# Patient Record
Sex: FEMALE | Race: ASIAN | NOT HISPANIC OR LATINO | URBAN - METROPOLITAN AREA
[De-identification: names, ages, dates, MRNs, and addresses within clinical notes are randomized per-mention and may not be internally consistent; named-entity substitution may affect disease eponyms.]

---

## 2018-09-09 ENCOUNTER — INPATIENT (INPATIENT)
Facility: HOSPITAL | Age: 63
LOS: 7 days | Discharge: HOME | DRG: 694 | End: 2018-09-17
Attending: INTERNAL MEDICINE | Admitting: INTERNAL MEDICINE
Payer: COMMERCIAL

## 2018-09-09 VITALS
SYSTOLIC BLOOD PRESSURE: 126 MMHG | DIASTOLIC BLOOD PRESSURE: 77 MMHG | TEMPERATURE: 99 F | HEART RATE: 88 BPM | OXYGEN SATURATION: 97 % | RESPIRATION RATE: 18 BRPM

## 2018-09-09 DIAGNOSIS — Z90.710 ACQUIRED ABSENCE OF BOTH CERVIX AND UTERUS: Chronic | ICD-10-CM

## 2018-09-09 DIAGNOSIS — Z98.890 OTHER SPECIFIED POSTPROCEDURAL STATES: Chronic | ICD-10-CM

## 2018-09-09 DIAGNOSIS — K52.9 NONINFECTIVE GASTROENTERITIS AND COLITIS, UNSPECIFIED: ICD-10-CM

## 2018-09-09 LAB
ACETONE SERPL-MCNC: ABNORMAL
ALBUMIN SERPL ELPH-MCNC: 2.5 G/DL — LOW (ref 3.5–5)
ALP SERPL-CCNC: 89 U/L — SIGNIFICANT CHANGE UP (ref 40–120)
ALT FLD-CCNC: 25 U/L DA — SIGNIFICANT CHANGE UP (ref 10–60)
ANION GAP SERPL CALC-SCNC: 15 MMOL/L — SIGNIFICANT CHANGE UP (ref 5–17)
APPEARANCE UR: ABNORMAL
APTT BLD: 27.4 SEC — LOW (ref 27.5–37.4)
AST SERPL-CCNC: 17 U/L — SIGNIFICANT CHANGE UP (ref 10–40)
BACTERIA # UR AUTO: ABNORMAL /HPF
BASOPHILS # BLD AUTO: 0.1 K/UL — SIGNIFICANT CHANGE UP (ref 0–0.2)
BASOPHILS NFR BLD AUTO: 0.4 % — SIGNIFICANT CHANGE UP (ref 0–2)
BILIRUB SERPL-MCNC: 0.6 MG/DL — SIGNIFICANT CHANGE UP (ref 0.2–1.2)
BILIRUB UR-MCNC: NEGATIVE — SIGNIFICANT CHANGE UP
BUN SERPL-MCNC: 32 MG/DL — HIGH (ref 7–18)
CALCIUM SERPL-MCNC: 8.3 MG/DL — LOW (ref 8.4–10.5)
CHLORIDE SERPL-SCNC: 99 MMOL/L — SIGNIFICANT CHANGE UP (ref 96–108)
CK MB BLD-MCNC: <2.1 % — SIGNIFICANT CHANGE UP (ref 0–3.5)
CK MB CFR SERPL CALC: <1 NG/ML — SIGNIFICANT CHANGE UP (ref 0–3.6)
CK SERPL-CCNC: 47 U/L — SIGNIFICANT CHANGE UP (ref 21–215)
CO2 SERPL-SCNC: 19 MMOL/L — LOW (ref 22–31)
COLOR SPEC: YELLOW — SIGNIFICANT CHANGE UP
CREAT SERPL-MCNC: 1.09 MG/DL — SIGNIFICANT CHANGE UP (ref 0.5–1.3)
DIFF PNL FLD: ABNORMAL
EOSINOPHIL # BLD AUTO: 0 K/UL — SIGNIFICANT CHANGE UP (ref 0–0.5)
EOSINOPHIL NFR BLD AUTO: 0.1 % — SIGNIFICANT CHANGE UP (ref 0–6)
EPI CELLS # UR: SIGNIFICANT CHANGE UP /HPF
GLUCOSE SERPL-MCNC: 106 MG/DL — HIGH (ref 70–99)
GLUCOSE UR QL: NEGATIVE — SIGNIFICANT CHANGE UP
HCT VFR BLD CALC: 33 % — LOW (ref 34.5–45)
HGB BLD-MCNC: 10.9 G/DL — LOW (ref 11.5–15.5)
INR BLD: 0.95 RATIO — SIGNIFICANT CHANGE UP (ref 0.88–1.16)
KETONES UR-MCNC: ABNORMAL
LEUKOCYTE ESTERASE UR-ACNC: ABNORMAL
LIDOCAIN IGE QN: 92 U/L — SIGNIFICANT CHANGE UP (ref 73–393)
LYMPHOCYTES # BLD AUTO: 0.7 K/UL — LOW (ref 1–3.3)
LYMPHOCYTES # BLD AUTO: 4.8 % — LOW (ref 13–44)
MAGNESIUM SERPL-MCNC: 1.8 MG/DL — SIGNIFICANT CHANGE UP (ref 1.6–2.6)
MCHC RBC-ENTMCNC: 29.4 PG — SIGNIFICANT CHANGE UP (ref 27–34)
MCHC RBC-ENTMCNC: 33.1 GM/DL — SIGNIFICANT CHANGE UP (ref 32–36)
MCV RBC AUTO: 88.8 FL — SIGNIFICANT CHANGE UP (ref 80–100)
MONOCYTES # BLD AUTO: 1.5 K/UL — HIGH (ref 0–0.9)
MONOCYTES NFR BLD AUTO: 9.7 % — SIGNIFICANT CHANGE UP (ref 2–14)
NEUTROPHILS # BLD AUTO: 12.8 K/UL — HIGH (ref 1.8–7.4)
NEUTROPHILS NFR BLD AUTO: 85 % — HIGH (ref 43–77)
NITRITE UR-MCNC: NEGATIVE — SIGNIFICANT CHANGE UP
PH UR: 5 — SIGNIFICANT CHANGE UP (ref 5–8)
PLATELET # BLD AUTO: 185 K/UL — SIGNIFICANT CHANGE UP (ref 150–400)
POTASSIUM SERPL-MCNC: 3.6 MMOL/L — SIGNIFICANT CHANGE UP (ref 3.5–5.3)
POTASSIUM SERPL-SCNC: 3.6 MMOL/L — SIGNIFICANT CHANGE UP (ref 3.5–5.3)
PROT SERPL-MCNC: 7.4 G/DL — SIGNIFICANT CHANGE UP (ref 6–8.3)
PROT UR-MCNC: 100
PROTHROM AB SERPL-ACNC: 10.3 SEC — SIGNIFICANT CHANGE UP (ref 9.8–12.7)
RBC # BLD: 3.71 M/UL — LOW (ref 3.8–5.2)
RBC # FLD: 13.8 % — SIGNIFICANT CHANGE UP (ref 10.3–14.5)
RBC CASTS # UR COMP ASSIST: ABNORMAL /HPF (ref 0–2)
SODIUM SERPL-SCNC: 133 MMOL/L — LOW (ref 135–145)
SP GR SPEC: 1.02 — SIGNIFICANT CHANGE UP (ref 1.01–1.02)
TROPONIN I SERPL-MCNC: <0.015 NG/ML — SIGNIFICANT CHANGE UP (ref 0–0.04)
UROBILINOGEN FLD QL: NEGATIVE — SIGNIFICANT CHANGE UP
WBC # BLD: 15.1 K/UL — HIGH (ref 3.8–10.5)
WBC # FLD AUTO: 15.1 K/UL — HIGH (ref 3.8–10.5)
WBC UR QL: ABNORMAL /HPF (ref 0–5)

## 2018-09-09 PROCEDURE — 71045 X-RAY EXAM CHEST 1 VIEW: CPT | Mod: 26

## 2018-09-09 PROCEDURE — 99285 EMERGENCY DEPT VISIT HI MDM: CPT

## 2018-09-09 PROCEDURE — 74177 CT ABD & PELVIS W/CONTRAST: CPT | Mod: 26

## 2018-09-09 RX ORDER — SODIUM CHLORIDE 9 MG/ML
3 INJECTION INTRAMUSCULAR; INTRAVENOUS; SUBCUTANEOUS ONCE
Qty: 0 | Refills: 0 | Status: COMPLETED | OUTPATIENT
Start: 2018-09-09 | End: 2018-09-09

## 2018-09-09 RX ORDER — SODIUM CHLORIDE 9 MG/ML
2000 INJECTION INTRAMUSCULAR; INTRAVENOUS; SUBCUTANEOUS ONCE
Qty: 0 | Refills: 0 | Status: COMPLETED | OUTPATIENT
Start: 2018-09-09 | End: 2018-09-09

## 2018-09-09 RX ORDER — MORPHINE SULFATE 50 MG/1
4 CAPSULE, EXTENDED RELEASE ORAL ONCE
Qty: 0 | Refills: 0 | Status: DISCONTINUED | OUTPATIENT
Start: 2018-09-09 | End: 2018-09-09

## 2018-09-09 RX ORDER — ONDANSETRON 8 MG/1
4 TABLET, FILM COATED ORAL ONCE
Qty: 0 | Refills: 0 | Status: COMPLETED | OUTPATIENT
Start: 2018-09-09 | End: 2018-09-09

## 2018-09-09 RX ORDER — SODIUM CHLORIDE 9 MG/ML
1000 INJECTION INTRAMUSCULAR; INTRAVENOUS; SUBCUTANEOUS
Qty: 0 | Refills: 0 | Status: DISCONTINUED | OUTPATIENT
Start: 2018-09-09 | End: 2018-09-13

## 2018-09-09 RX ORDER — PIPERACILLIN AND TAZOBACTAM 4; .5 G/20ML; G/20ML
3.38 INJECTION, POWDER, LYOPHILIZED, FOR SOLUTION INTRAVENOUS ONCE
Qty: 0 | Refills: 0 | Status: COMPLETED | OUTPATIENT
Start: 2018-09-09 | End: 2018-09-09

## 2018-09-09 RX ADMIN — ONDANSETRON 4 MILLIGRAM(S): 8 TABLET, FILM COATED ORAL at 22:47

## 2018-09-09 RX ADMIN — PIPERACILLIN AND TAZOBACTAM 200 GRAM(S): 4; .5 INJECTION, POWDER, LYOPHILIZED, FOR SOLUTION INTRAVENOUS at 22:49

## 2018-09-09 RX ADMIN — SODIUM CHLORIDE 2000 MILLILITER(S): 9 INJECTION INTRAMUSCULAR; INTRAVENOUS; SUBCUTANEOUS at 22:48

## 2018-09-09 RX ADMIN — MORPHINE SULFATE 4 MILLIGRAM(S): 50 CAPSULE, EXTENDED RELEASE ORAL at 22:49

## 2018-09-09 RX ADMIN — SODIUM CHLORIDE 125 MILLILITER(S): 9 INJECTION INTRAMUSCULAR; INTRAVENOUS; SUBCUTANEOUS at 19:15

## 2018-09-09 RX ADMIN — SODIUM CHLORIDE 3 MILLILITER(S): 9 INJECTION INTRAMUSCULAR; INTRAVENOUS; SUBCUTANEOUS at 19:15

## 2018-09-09 NOTE — ED ADULT NURSE NOTE - NSIMPLEMENTINTERV_GEN_ALL_ED
Implemented All Universal Safety Interventions:  [place] to call system. Call bell, personal items and telephone within reach. Instruct patient to call for assistance. Room bathroom lighting operational. Non-slip footwear when patient is off stretcher. Physically safe environment: no spills, clutter or unnecessary equipment. Stretcher in lowest position, wheels locked, appropriate side rails in place.

## 2018-09-09 NOTE — ED PROVIDER NOTE - CARE PLAN
Principal Discharge DX:	Colitis  Secondary Diagnosis:	Pyelonephritis  Secondary Diagnosis:	Renal colic

## 2018-09-09 NOTE — ED PROVIDER NOTE - MEDICAL DECISION MAKING DETAILS
62 y/o F pt w/ left sided abdominal pain, concern for diverticulitis, will check labs and CT, concern for dehydration as well, will reassess

## 2018-09-09 NOTE — ED PROVIDER NOTE - OBJECTIVE STATEMENT
62 y/o F pt w/ PMHx of COPD presents to ED c/o left lower abdominal pain x 4 days. Pt reports associated subjective fever, decreased appetite, and nausea. Pt saw primary doctor, and given a fleet enema. Pt says pain is dull, constant and radiates to her upper abdomen and is no relieved when she takes Tylenol. Pt was treated with Cipro, today is day 3 of course. Pt denies chills, dysuria and all other complaints. Allergic to Ibuprofen and Motrin.

## 2018-09-09 NOTE — ED ADULT NURSE NOTE - NS ED NURSE LEVEL OF CONSCIOUSNESS SPEECH
Miscarried in [place] this weekend at 19 wks, needed a D&C  U/s today shows no residual POCs but multiple fibroids  Wants to get pregnant in near future  Abstain x 2 months ok to try again   Speaking Coherently

## 2018-09-10 DIAGNOSIS — J44.9 CHRONIC OBSTRUCTIVE PULMONARY DISEASE, UNSPECIFIED: ICD-10-CM

## 2018-09-10 DIAGNOSIS — J98.11 ATELECTASIS: ICD-10-CM

## 2018-09-10 DIAGNOSIS — K52.9 NONINFECTIVE GASTROENTERITIS AND COLITIS, UNSPECIFIED: ICD-10-CM

## 2018-09-10 DIAGNOSIS — J47.9 BRONCHIECTASIS, UNCOMPLICATED: ICD-10-CM

## 2018-09-10 DIAGNOSIS — J45.909 UNSPECIFIED ASTHMA, UNCOMPLICATED: ICD-10-CM

## 2018-09-10 DIAGNOSIS — D64.9 ANEMIA, UNSPECIFIED: ICD-10-CM

## 2018-09-10 DIAGNOSIS — N23 UNSPECIFIED RENAL COLIC: ICD-10-CM

## 2018-09-10 DIAGNOSIS — N12 TUBULO-INTERSTITIAL NEPHRITIS, NOT SPECIFIED AS ACUTE OR CHRONIC: ICD-10-CM

## 2018-09-10 DIAGNOSIS — Z29.9 ENCOUNTER FOR PROPHYLACTIC MEASURES, UNSPECIFIED: ICD-10-CM

## 2018-09-10 DIAGNOSIS — K29.80 DUODENITIS WITHOUT BLEEDING: ICD-10-CM

## 2018-09-10 LAB
ANION GAP SERPL CALC-SCNC: 10 MMOL/L — SIGNIFICANT CHANGE UP (ref 5–17)
BUN SERPL-MCNC: 20 MG/DL — HIGH (ref 7–18)
CALCIUM SERPL-MCNC: 7.6 MG/DL — LOW (ref 8.4–10.5)
CHLORIDE SERPL-SCNC: 106 MMOL/L — SIGNIFICANT CHANGE UP (ref 96–108)
CHOLEST SERPL-MCNC: 152 MG/DL — SIGNIFICANT CHANGE UP (ref 10–199)
CO2 SERPL-SCNC: 21 MMOL/L — LOW (ref 22–31)
CREAT SERPL-MCNC: 0.92 MG/DL — SIGNIFICANT CHANGE UP (ref 0.5–1.3)
CULTURE RESULTS: SIGNIFICANT CHANGE UP
EOSINOPHIL NFR BLD AUTO: 2 % — SIGNIFICANT CHANGE UP (ref 0–6)
GLUCOSE SERPL-MCNC: 149 MG/DL — HIGH (ref 70–99)
HCT VFR BLD CALC: 32.9 % — LOW (ref 34.5–45)
HDLC SERPL-MCNC: 15 MG/DL — LOW
HGB BLD-MCNC: 10.5 G/DL — LOW (ref 11.5–15.5)
LACTATE SERPL-SCNC: 0.8 MMOL/L — SIGNIFICANT CHANGE UP (ref 0.7–2)
LIPID PNL WITH DIRECT LDL SERPL: 89 MG/DL — SIGNIFICANT CHANGE UP
LYMPHOCYTES # BLD AUTO: 9 % — LOW (ref 13–44)
MAGNESIUM SERPL-MCNC: 1.7 MG/DL — SIGNIFICANT CHANGE UP (ref 1.6–2.6)
MCHC RBC-ENTMCNC: 28.5 PG — SIGNIFICANT CHANGE UP (ref 27–34)
MCHC RBC-ENTMCNC: 32 GM/DL — SIGNIFICANT CHANGE UP (ref 32–36)
MCV RBC AUTO: 89.2 FL — SIGNIFICANT CHANGE UP (ref 80–100)
MONOCYTES NFR BLD AUTO: 8 % — SIGNIFICANT CHANGE UP (ref 2–14)
NEUTROPHILS NFR BLD AUTO: 63 % — SIGNIFICANT CHANGE UP (ref 43–77)
PHOSPHATE SERPL-MCNC: 1.6 MG/DL — LOW (ref 2.5–4.5)
PLATELET # BLD AUTO: 188 K/UL — SIGNIFICANT CHANGE UP (ref 150–400)
POTASSIUM SERPL-MCNC: 3.4 MMOL/L — LOW (ref 3.5–5.3)
POTASSIUM SERPL-SCNC: 3.4 MMOL/L — LOW (ref 3.5–5.3)
RBC # BLD: 3.69 M/UL — LOW (ref 3.8–5.2)
RBC # FLD: 13.9 % — SIGNIFICANT CHANGE UP (ref 10.3–14.5)
SODIUM SERPL-SCNC: 137 MMOL/L — SIGNIFICANT CHANGE UP (ref 135–145)
SPECIMEN SOURCE: SIGNIFICANT CHANGE UP
TOTAL CHOLESTEROL/HDL RATIO MEASUREMENT: 10.1 RATIO — HIGH (ref 3.3–7.1)
TRIGL SERPL-MCNC: 241 MG/DL — HIGH (ref 10–149)
TSH SERPL-MCNC: 1.77 UU/ML — SIGNIFICANT CHANGE UP (ref 0.34–4.82)
WBC # BLD: 15.5 K/UL — HIGH (ref 3.8–10.5)
WBC # FLD AUTO: 15.5 K/UL — HIGH (ref 3.8–10.5)

## 2018-09-10 RX ORDER — ENOXAPARIN SODIUM 100 MG/ML
40 INJECTION SUBCUTANEOUS DAILY
Qty: 0 | Refills: 0 | Status: DISCONTINUED | OUTPATIENT
Start: 2018-09-10 | End: 2018-09-17

## 2018-09-10 RX ORDER — METRONIDAZOLE 500 MG
500 TABLET ORAL EVERY 8 HOURS
Qty: 0 | Refills: 0 | Status: DISCONTINUED | OUTPATIENT
Start: 2018-09-10 | End: 2018-09-17

## 2018-09-10 RX ORDER — TAMSULOSIN HYDROCHLORIDE 0.4 MG/1
0.4 CAPSULE ORAL AT BEDTIME
Qty: 0 | Refills: 0 | Status: DISCONTINUED | OUTPATIENT
Start: 2018-09-10 | End: 2018-09-17

## 2018-09-10 RX ORDER — POTASSIUM PHOSPHATE, MONOBASIC POTASSIUM PHOSPHATE, DIBASIC 236; 224 MG/ML; MG/ML
15 INJECTION, SOLUTION INTRAVENOUS ONCE
Qty: 0 | Refills: 0 | Status: COMPLETED | OUTPATIENT
Start: 2018-09-10 | End: 2018-09-10

## 2018-09-10 RX ORDER — PANTOPRAZOLE SODIUM 20 MG/1
40 TABLET, DELAYED RELEASE ORAL
Qty: 0 | Refills: 0 | Status: DISCONTINUED | OUTPATIENT
Start: 2018-09-10 | End: 2018-09-17

## 2018-09-10 RX ORDER — FERROUS SULFATE 325(65) MG
325 TABLET ORAL DAILY
Qty: 0 | Refills: 0 | Status: DISCONTINUED | OUTPATIENT
Start: 2018-09-10 | End: 2018-09-17

## 2018-09-10 RX ORDER — CEFTRIAXONE 500 MG/1
1 INJECTION, POWDER, FOR SOLUTION INTRAMUSCULAR; INTRAVENOUS EVERY 24 HOURS
Qty: 0 | Refills: 0 | Status: DISCONTINUED | OUTPATIENT
Start: 2018-09-10 | End: 2018-09-17

## 2018-09-10 RX ORDER — BUDESONIDE AND FORMOTEROL FUMARATE DIHYDRATE 160; 4.5 UG/1; UG/1
2 AEROSOL RESPIRATORY (INHALATION)
Qty: 0 | Refills: 0 | Status: DISCONTINUED | OUTPATIENT
Start: 2018-09-10 | End: 2018-09-17

## 2018-09-10 RX ORDER — ALBUTEROL 90 UG/1
1 AEROSOL, METERED ORAL EVERY 6 HOURS
Qty: 0 | Refills: 0 | Status: DISCONTINUED | OUTPATIENT
Start: 2018-09-10 | End: 2018-09-17

## 2018-09-10 RX ORDER — POTASSIUM CHLORIDE 20 MEQ
40 PACKET (EA) ORAL ONCE
Qty: 0 | Refills: 0 | Status: COMPLETED | OUTPATIENT
Start: 2018-09-10 | End: 2018-09-10

## 2018-09-10 RX ADMIN — MORPHINE SULFATE 4 MILLIGRAM(S): 50 CAPSULE, EXTENDED RELEASE ORAL at 00:10

## 2018-09-10 RX ADMIN — Medication 100 MILLIGRAM(S): at 07:21

## 2018-09-10 RX ADMIN — TAMSULOSIN HYDROCHLORIDE 0.4 MILLIGRAM(S): 0.4 CAPSULE ORAL at 21:21

## 2018-09-10 RX ADMIN — BUDESONIDE AND FORMOTEROL FUMARATE DIHYDRATE 2 PUFF(S): 160; 4.5 AEROSOL RESPIRATORY (INHALATION) at 21:21

## 2018-09-10 RX ADMIN — Medication 100 MILLIGRAM(S): at 21:18

## 2018-09-10 RX ADMIN — Medication 40 MILLIEQUIVALENT(S): at 19:22

## 2018-09-10 RX ADMIN — BUDESONIDE AND FORMOTEROL FUMARATE DIHYDRATE 2 PUFF(S): 160; 4.5 AEROSOL RESPIRATORY (INHALATION) at 12:33

## 2018-09-10 RX ADMIN — Medication 100 MILLIGRAM(S): at 18:35

## 2018-09-10 RX ADMIN — ENOXAPARIN SODIUM 40 MILLIGRAM(S): 100 INJECTION SUBCUTANEOUS at 12:30

## 2018-09-10 RX ADMIN — POTASSIUM PHOSPHATE, MONOBASIC POTASSIUM PHOSPHATE, DIBASIC 62.5 MILLIMOLE(S): 236; 224 INJECTION, SOLUTION INTRAVENOUS at 19:22

## 2018-09-10 NOTE — CONSULT NOTE ADULT - ATTENDING COMMENTS
Agree with note as written above. Pt currently with some left sided abdominal pain but well controlled.    Plan:  - Reviewed CT imaging. Pt with possible left ureteral stone with mild hydro and concern for pyelonephritis vs. microabscess. In addition with some possible colitis  - Continue to monitor closely  - NPO past midnight in case pt clinically worsens requiring ureteral stent

## 2018-09-10 NOTE — H&P ADULT - ASSESSMENT
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 She had lower abdominal pain on thursday morning which she initially thought that is from holding urine for long time or form constipation but it persisted through out day so she went to PCP next day who gave her cipro and fleet enema for constipation. Her pain was dull over right lower quadrant constant, 5/10 in intensity. After enema she had small bowel movement but it did not help her with pain and she also noted some nausea. Denies urinary problems, chest pain, dizziness, vomiting, SOB, BE.       In ED:  vitals: 129/63, 87, 98.7, 18(100)  labs: pertinent for WBC of 15.1 and Na of 133  UA positive   CT abdomen/pelvis: evidence of colitis and left pyelonephritis   stat dose of zosyn and levoflox

## 2018-09-10 NOTE — ED ADULT NURSE REASSESSMENT NOTE - NS ED NURSE REASSESS COMMENT FT1
Pt received aoxo3 with ivf  infusing no infiltration at site, complaints verbalized no distress noted.

## 2018-09-10 NOTE — CONSULT NOTE ADULT - PROBLEM SELECTOR RECOMMENDATION 9
Ventolin HFA 2 puffs po QiD PRN  Symbicort 160/4.5 mcgs 2 puffs po QiD  PFTs as OP
1- UA & CS.  2- Blood culture.  3- Renal and bladder sonogram.  4- Rocephin 1 gm IVPB q day.  5- IV and PO hydration.  6- CBC and BMP follow up.  7- Continue IV Flagyl for colitis.

## 2018-09-10 NOTE — H&P ADULT - PROBLEM SELECTOR PLAN 4
IMPROVE VTE Individual Risk Assessment          RISK                                                          Points  [  ] Previous VTE                                                3  [  ] Thrombophilia                                             2  [  ] Lower limb paralysis                                   2        (unable to hold up >15 seconds)    [  ] Current Cancer                                             2         (within 6 months)  [x  ] Immobilization > 24 hrs                              1  [  ] ICU/CCU stay > 24 hours                             1  [ x ] Age > 60                                                         1    IMPROVE VTE Score: 2  will start on lovenox

## 2018-09-10 NOTE — CONSULT NOTE ADULT - REASON FOR ADMISSION
lower abdominal pain for 4 days

## 2018-09-10 NOTE — CONSULT NOTE ADULT - ASSESSMENT
62 yo female with left 2mm distal ureteral ston, L pyelonephritis with small microabscess    1) cont iv antibx  2) recommend npo after midnight for possible cysto and stent  3) pre-op labs  4)rec flomax  5) cont med management  6) case and plan discussed with Dr. Jackson and agrees 64 yo female with left 2mm distal ureteral ston, L pyelonephritis with small microabscess    1) cont iv antibx  2) recommend npo after midnight for possible cysto and stent  3) pre-op labs  4)rec flomax  5) cont med management  6) please optimize pt medically for possible OR in AM  7) case and plan discussed with Dr. Jackson and agrees

## 2018-09-10 NOTE — H&P ADULT - NSHPPHYSICALEXAM_GEN_ALL_CORE
· Constitutional	Well-developed, well nourished  · Eyes	EOMI; PERRL; no drainage or redness  · ENMT	No oral lesions; no gross abnormalities  · Neck	No bruits; no thyromegaly or nodules   · Back	No deformity or limitation of movement   · Respiratory	Breath Sounds equal & clear to percussion & auscultation, no accessory muscle use  · Cardiovascular	Regular rate & rhythm, normal S1, S2; no murmurs, gallops or rubs; no S3, S4  · Gastrointestinal	Soft, non-tender, no hepatosplenomegaly, normal bowel sounds  · Extremities	No cyanosis, clubbing or edema   · Neurological	Alert & oriented; no sensory, motor or coordination deficits, normal reflexes  · Musculoskeletal	No joint pain, swelling or deformity; no limitation of movement · Constitutional	Well-developed, well nourished  · Eyes	EOMI; PERRL; no drainage or redness  · ENMT	No oral lesions; no gross abnormalities  · Neck	No bruits; no thyromegaly or nodules   · Back	No deformity or limitation of movement   · Respiratory	Breath Sounds equal & clear to percussion & auscultation, no accessory muscle use  · Cardiovascular	Regular rate & rhythm, normal S1, S2; no murmurs, gallops or rubs; no S3, S4  · Gastrointestinal	Soft, non-tender, no hepatosplenomegaly, normal bowel sounds  · Extremities	No cyanosis, clubbing or edema   · Neurological	Alert & oriented; no sensory, motor or coordination deficits, normal reflexes  · Musculoskeletal	No joint pain, swelling or deformity; no limitation of movement  -- back -left sided costo vertebral tenderness

## 2018-09-10 NOTE — H&P ADULT - PROBLEM SELECTOR PLAN 2
CT abdomen- colitis   presented with abdominal pain   was kept NPO over night   reports improvement in pain   will advance diet to full liquid

## 2018-09-10 NOTE — CONSULT NOTE ADULT - PROBLEM SELECTOR RECOMMENDATION 4
panculture  antibiotics  ID eval
1- Anemia profile.  2- Iron supplements.  3- Closely monitor H & H.  4- Observe for bleeding.

## 2018-09-10 NOTE — H&P ADULT - ATTENDING COMMENTS
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 She had lower abdominal pain on thursday morning which she initially thought that is from holding urine for long time or form constipation but it persisted through out day so she went to PCP next day who gave her cipro and fleet enema for constipation. Her pain was dull over right lower quadrant constant, 5/10 in intensity. After enema she had small bowel movement but it did not help her with pain and she also noted some nausea. Denies urinary problems, chest pain, dizziness, vomiting, SOB, BE.     pt seen in bed, vitals stable, physical exam reveals lungs cta b/l, heart s1s2, abd soft nd bs+, left flank tenderness to percussion, ext no edema. labs and diagnostic test result reviewed.    assessment  --  left pyelonephritis with micro abcesses, left distal ureteral stone, colitis, duodenitis, anemia, h/o asthma    plan  --  admit to med, rocephin, flagyl, flomax, cont preadmit home meds, gi and dvt profilaxis,  cbc, bmp, mg, phos, lipids, tsh, bld cx, ucx, anemia panel    urology cons  id cons  gi cons  pulm cons

## 2018-09-10 NOTE — PATIENT PROFILE ADULT. - VISION (WITH CORRECTIVE LENSES IF THE PATIENT USUALLY WEARS THEM):
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

## 2018-09-10 NOTE — CONSULT NOTE ADULT - ASSESSMENT
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 She had lower abdominal pain on thursday morning which she initially thought that is from holding urine for long time or form constipation but it persisted through out day so she went to PCP next day who gave her Cipro and fleet enema for constipation.  Ct abdomen suggested left pyelonephritis and mild colitis.  Patient was started on IV Rocephin and Flagyl.

## 2018-09-10 NOTE — CONSULT NOTE ADULT - PROBLEM SELECTOR RECOMMENDATION 2
incentive spirometry  Bronchodilators  chest pt
1- IV hydration.  2- stool for ova and parasites.  3- Stool for culture.  4- stool for CDT.  5- CBC & BMP follow up.   6- ABX as per problem #1.

## 2018-09-10 NOTE — H&P ADULT - PROBLEM SELECTOR PLAN 1
qSOFA 0  presented with back pain, abdominal pain, nausea  reports that she frequently holds urine   CT abdomen/pelvis: colitis and left pyelonephritis   stat dose of zosyn and levoflox was given in ED   will start on rocephin and metro  follow Urine culture  ID Dr. Ace qSOFA 0  presented with back pain, abdominal pain, nausea  reports that she frequently holds urine   CT abdomen/pelvis: colitis and left pyelonephritis with microabscess noted   stat dose of zosyn and levoflox was given in ED   will start on rocephin and metro  follow Urine culture  ID Dr. Ace  urology consult for microabscess and stone in distal ureter

## 2018-09-10 NOTE — CONSULT NOTE ADULT - NEGATIVE ENMT SYMPTOMS
no nose bleeds/no ear pain/no hearing difficulty/no throat pain/no dysphagia/no dry mouth/no gum bleeding

## 2018-09-10 NOTE — CONSULT NOTE ADULT - PROBLEM SELECTOR RECOMMENDATION 3
Bronchodilators prn
1- Bronchodilators.  2- O2 as needed.  3- Pulmonary evaluation and management.  4- Steroids as per primary, and pulmonary team

## 2018-09-10 NOTE — CONSULT NOTE ADULT - ASSESSMENT
1. Abdominal pain  2. Colitis  3. Duodenal ulcer  4. Pyelonephritis  5. Left urethral stone    Suggestions:    1. Check stool for culture, O&P and C. diff toxin  2. Monitor electrolytes  3. Antibiotics IV  4. Protonix 40mg daily  5. Avoid NSAID  6. Urology evaluation  7. EGD  8. DVT prophylaxis

## 2018-09-10 NOTE — PROGRESS NOTE ADULT - SUBJECTIVE AND OBJECTIVE BOX
NP Note discussed with  Primary Attending    Patient is a 63y old  Female who presents with a chief complaint of lower abdominal pain for 4 days (10 Sep 2018 12:59)      INTERVAL HPI/OVERNIGHT EVENTS: no new complaints    MEDICATIONS  (STANDING):  ALBUTerol    90 MICROgram(s) HFA Inhaler 1 Puff(s) Inhalation every 6 hours  buDESOnide  80 MICROgram(s)/formoterol 4.5 MICROgram(s) Inhaler 2 Puff(s) Inhalation two times a day  cefTRIAXone   IVPB 1 Gram(s) IV Intermittent every 24 hours  enoxaparin Injectable 40 milliGRAM(s) SubCutaneous daily  ferrous    sulfate 325 milliGRAM(s) Oral daily  metroNIDAZOLE  IVPB 500 milliGRAM(s) IV Intermittent every 8 hours  pantoprazole    Tablet 40 milliGRAM(s) Oral before breakfast  sodium chloride 0.9%. 1000 milliLiter(s) (125 mL/Hr) IV Continuous <Continuous>  tamsulosin 0.4 milliGRAM(s) Oral at bedtime    MEDICATIONS  (PRN):      __________________________________________________  REVIEW OF SYSTEMS:    CONSTITUTIONAL: No fever,   EYES: no acute visual disturbances  NECK: No pain or stiffness  RESPIRATORY: No cough; No shortness of breath  CARDIOVASCULAR: No chest pain, no palpitations  GASTROINTESTINAL: Left/Mid-lower stomach tender. No nausea or vomiting; No diarrhea   NEUROLOGICAL: No headache or numbness, no tremors  MUSCULOSKELETAL: No joint pain, no muscle pain  GENITOURINARY: no dysuria, no frequency, no hesitancy  PSYCHIATRY: no depression , no anxiety  ALL OTHER  ROS negative        Vital Signs Last 24 Hrs  T(C): 37.1 (10 Sep 2018 12:31), Max: 37.6 (10 Sep 2018 07:47)  T(F): 98.8 (10 Sep 2018 12:31), Max: 99.6 (10 Sep 2018 07:47)  HR: 76 (10 Sep 2018 12:31) (75 - 88)  BP: 119/52 (10 Sep 2018 12:31) (119/52 - 129/63)  BP(mean): --  RR: 16 (10 Sep 2018 12:31) (16 - 18)  SpO2: 99% (10 Sep 2018 12:31) (97% - 100%)    ________________________________________________  PHYSICAL EXAM:  GENERAL: NAD  HEENT: Normocephalic;  conjunctivae and sclerae clear; moist mucous membranes;   NECK : supple  CHEST/LUNG: Clear to auscultation bilaterally with good air entry   HEART: S1 S2  regular; no murmurs, gallops or rubs  BACK: Left CVA tenderness  ABDOMEN: Soft, left lower abdominal tenderness, Nondistended; Bowel sounds present  EXTREMITIES: no cyanosis; no edema; no calf tenderness  SKIN: warm and dry; no rash  NERVOUS SYSTEM:  Awake and alert; Oriented  to place, person and time ; no new deficits    _________________________________________________  LABS:                        10.5   15.5  )-----------( 188      ( 10 Sep 2018 15:14 )             32.9         133<L>  |  99  |  32<H>  ----------------------------<  106<H>  3.6   |  19<L>  |  1.09    Ca    8.3<L>      09 Sep 2018 19:14  Mg     1.8         TPro  7.4  /  Alb  2.5<L>  /  TBili  0.6  /  DBili  x   /  AST  17  /  ALT  25  /  AlkPhos  89      PT/INR - ( 09 Sep 2018 19:14 )   PT: 10.3 sec;   INR: 0.95 ratio         PTT - ( 09 Sep 2018 19:14 )  PTT:27.4 sec  Urinalysis Basic - ( 09 Sep 2018 19:14 )    Color: Yellow / Appearance: Slightly Turbid / S.020 / pH: x  Gluc: x / Ketone: Large  / Bili: Negative / Urobili: Negative   Blood: x / Protein: 100 / Nitrite: Negative   Leuk Esterase: Moderate / RBC: 2-5 /HPF / WBC 11-25 /HPF   Sq Epi: x / Non Sq Epi: Few /HPF / Bacteria: Moderate /HPF      CAPILLARY BLOOD GLUCOSE            RADIOLOGY & ADDITIONAL TESTS:    Imaging Personally Reviewed:  YES    Consultant(s) Notes Reviewed:   YES    Care Discussed with Consultants :     Plan of care was discussed with patient and /or primary care giver; all questions and concerns were addressed and care was aligned with patient's wishes.

## 2018-09-10 NOTE — H&P ADULT - NSHPLABSRESULTS_GEN_ALL_CORE
ICU Vital Signs Last 24 Hrs  T(C): 37.1 (09 Sep 2018 23:36), Max: 37.1 (09 Sep 2018 23:36)  T(F): 98.7 (09 Sep 2018 23:36), Max: 98.7 (09 Sep 2018 23:36)  HR: 87 (09 Sep 2018 23:36) (87 - 88)  BP: 129/63 (09 Sep 2018 23:36) (126/77 - 129/63)  RR: 18 (09 Sep 2018 23:36) (18 - 18)  SpO2: 100% (09 Sep 2018 23:36) (97% - 100%)                            10.9   15.1  )-----------( 185      ( 09 Sep 2018 19:14 )             33.0           133<L>  |  99  |  32<H>  ----------------------------<  106<H>  3.6   |  19<L>  |  1.09    Ca    8.3<L>      09 Sep 2018 19:14  Mg     1.8         TPro  7.4  /  Alb  2.5<L>  /  TBili  0.6  /  DBili  x   /  AST  17  /  ALT  25  /  AlkPhos  89                Urinalysis Basic - ( 09 Sep 2018 19:14 )    Color: Yellow / Appearance: Slightly Turbid / S.020 / pH: x  Gluc: x / Ketone: Large  / Bili: Negative / Urobili: Negative   Blood: x / Protein: 100 / Nitrite: Negative   Leuk Esterase: Moderate / RBC: 2-5 /HPF / WBC 11-25 /HPF   Sq Epi: x / Non Sq Epi: Few /HPF / Bacteria: Moderate /HPF        PT/INR - ( 09 Sep 2018 19:14 )   PT: 10.3 sec;   INR: 0.95 ratio         PTT - ( 09 Sep 2018 19:14 )  PTT:27.4 sec    Lactate Trend  09-10 @ 01:14 Lactate:0.8       CARDIAC MARKERS ( 09 Sep 2018 19:14 )  <0.015 ng/mL / x     / 47 U/L / x     / <1.0 ng/mL    < from: CT Abdomen and Pelvis w/ Oral Cont and w/ IV Cont (18 @ 22:18) >      IMPRESSION:    Left-sided pyelonephritis with small microabscesses. Mild left renal   pelvic fullness without hydroureter. A 2 mm stone in the course of the   left distal ureter may represent an obstructing stone as opposed to   phlebolith; difficult to assess without ureteral distention or previous   study for comparison. Duodenitis.  Mild colitis.      < end of copied text >

## 2018-09-10 NOTE — CONSULT NOTE ADULT - SUBJECTIVE AND OBJECTIVE BOX
63y Female with PMHx of asthma presented with left flank pain radiating to the front for 4 days. She denies hematuria, no dysuria, no chest pain, no fevers at home.     pmhx: as above  pshx: none  meds:  buDESOnide  80 MICROgram(s)/formoterol 4.5 MICROgram(s) Inhaler 2 Puff(s) Inhalation two times a day  cefTRIAXone   IVPB 1 Gram(s) IV Intermittent every 24 hours  enoxaparin Injectable 40 milliGRAM(s) SubCutaneous daily  metroNIDAZOLE  IVPB 500 milliGRAM(s) IV Intermittent every 8 hours  pantoprazole    Tablet 40 milliGRAM(s) Oral before breakfast  sodium chloride 0.9%. 1000 milliLiter(s) IV Continuous <Continuous>  tamsulosin 0.4 milliGRAM(s) Oral at bedtime    Motrin (Swelling)      T(C): 37.1 (09-10-18 @ 12:31), Max: 37.6 (09-10-18 @ 07:47)  HR: 76 (09-10-18 @ 12:31) (75 - 88)  BP: 119/52 (09-10-18 @ 12:31) (119/52 - 129/63)  RR: 16 (09-10-18 @ 12:31) (16 - 18)  SpO2: 99% (09-10-18 @ 12:31) (97% - 100%)  Wt(kg): --    Gen: A&Ox3, NAD  Skin: no rashes, no jaundice  Abdomen: soft, nontender, nondistended  Back: left cva tenderness to palpation  Lower Extremities: no edema, no skin discoloration, no calf tenderness susan                          10.9   15.1  )-----------( 185      ( 09 Sep 2018 19:14 )             33.0   09-09    133<L>  |  99  |  32<H>  ----------------------------<  106<H>  3.6   |  19<L>  |  1.09    Ca    8.3<L>      09 Sep 2018 19:14  Mg     1.8     09-09    TPro  7.4  /  Alb  2.5<L>  /  TBili  0.6  /  DBili  x   /  AST  17  /  ALT  25  /  AlkPhos  89  09-09    < from: CT Abdomen and Pelvis w/ Oral Cont and w/ IV Cont (09.09.18 @ 22:18) >  IMPRESSION:    Left-sided pyelonephritis with small microabscesses. Mild left renal   pelvic fullness without hydroureter. A 2 mm stone in the course of the   left distal ureter may represent an obstructing stone as opposed to   phlebolith; difficult to assess without ureteral distention or previous   study for comparison. Duodenitis.  Mild colitis.      < end of copied text >

## 2018-09-10 NOTE — CONSULT NOTE ADULT - SUBJECTIVE AND OBJECTIVE BOX
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 She had lower abdominal pain on thursday morning which she initially thought that is from holding urine for long time or form constipation but it persisted through out day so she went to PCP next day who gave her cipro and fleet enema for constipation. Her pain was dull over right lower quadrant constant, 5/10 in intensity. After enema she had small bowel movement but it did not help her with pain and she also noted some nausea. Denies urinary problems, chest pain, dizziness, vomiting, SOB, BE. (10 Sep 2018 05:59)      PAST MEDICAL & SURGICAL HISTORY:  COPD (chronic obstructive pulmonary disease)  S/P thyroid surgery  S/P RANDOLPH-BSO      Motrin (Swelling)      buDESOnide  80 MICROgram(s)/formoterol 4.5 MICROgram(s) Inhaler 2 Puff(s) Inhalation two times a day  cefTRIAXone   IVPB 1 Gram(s) IV Intermittent every 24 hours  enoxaparin Injectable 40 milliGRAM(s) SubCutaneous daily  metroNIDAZOLE  IVPB 500 milliGRAM(s) IV Intermittent every 8 hours  pantoprazole    Tablet 40 milliGRAM(s) Oral before breakfast  sodium chloride 0.9%. 1000 milliLiter(s) IV Continuous <Continuous>      Social Hx:    FAMILY HISTORY:  No pertinent family history in first degree relatives        ROS  [  ] UNABLE TO ELICIT    General:  [  ] None  [  ] Fever  [  ] Chills  [  ] Malaise    Skin:  [  ] None [  ] Rash  [  ] Wound  [  ] Ulcer    HEENT:  [  ] None  [  ] Sore Throat  [  ] Nasal congestion/ runny nose  [  ] Photophobia [  ] Neck pain      Chest:  [  ] None   [  ] SOB  [  ] Cough  [  ] None    Cardiovascular:   [  ] None  [  ] CP  [  ] Palpitation    Gastrointestinal:  [  ] None  [  ] Abd pain   [  ] Nausea    [  ] Vomiting   [  ] Diarrhea	     Genitourinary:  [  ] None [  ] Polyuria   [  ] Urgency  [  ] Frequency  [  ] Dysuria    [  ]  Hematuria       Musculoskeletal:  [  ] None [  ] Back Pain	[  ] Body aches  [  ] Joint pain    Neurological: [  ] None [  ]Dizziness  [  ]Visual Disturbance  [  ]Headaches   [  ] Weakness      PHYSICAL EXAM:    Vital Signs Last 24 Hrs  T(C): 37.6 (10 Sep 2018 07:47), Max: 37.6 (10 Sep 2018 07:47)  T(F): 99.6 (10 Sep 2018 07:47), Max: 99.6 (10 Sep 2018 07:47)  HR: 75 (10 Sep 2018 07:47) (75 - 88)  BP: 125/65 (10 Sep 2018 07:47) (125/62 - 129/63)  BP(mean): --  RR: 16 (10 Sep 2018 07:47) (16 - 18)  SpO2: 100% (10 Sep 2018 07:47) (97% - 100%)    Constitutional:    HEENT: [  ] Wnl  [  ] Pharyngeal congestion    Neck:  [  ] Supple  [  ]Lymphadenopathy  [  ] No JVD   [  ] JVD  [  ] Masses   [  ] WNL    CHEST/Respiratory:  [  ]Clear to auscultation  [  ] Rales   [  ] Rhonchi   [  ] Wheezing     [  ] Chest Tenderness      Cardiovascular:  [  ] Reg S1 S2   [  ] Irreg S1 S2   [  ]No Murmur  [  ] +ve Murmurs  [  ]Systolic [  ]Diastolic      Abdomen:  [  ] Soft  [  ] No tendrerness  [  ] Tenderness  [  ] Organomegaly  [  ] ABD Distention  [  ] Rigidity                       [  ] No Regidity                       [  ] No Rebound Tenderness  [  ] No Guarding Rigidity  [  ] Rebound Tenderness[  ] Guarding Rigidity                          [  ]  +ve Bowel Sounds  [  ] Decreased Bowel Sounds    [  ] Absent Bowel Sounds                            Extremities: [  ] No edema [  ] Edema  [  ] Clubbing   [  ] Cyanosis                         [  ] No Tender Calf muscles  [  ] Tender Calf muscles                        [  ] Palpable peripheral pulses    Neurological: [  ] Awake  [  ] Alert  [  ] Oriented  x                             [  ] Confused  [  ] Drowsy  [  ] respond to painful stimuli  [  ] Unresponsive    Skin:  [  ] Intact [  ] Redness [  ] Thrombophlebitis  [  ] Rashes  [  ] Dry  [  ] Ulcers    Ortho:  [  ] Joint Swelling  [  ] Joint erythema [  ] Joint tenderness                [  ] Increased temp. to touch  [  ] DJD [  ] WNL      LABS/DIAGNOSTIC TESTS                          10.9   15.1  )-----------( 185      ( 09 Sep 2018 19:14 )             33.0     WBC Count: 15.1 K/uL ( @ 19:14)          133<L>  |  99  |  32<H>  ----------------------------<  106<H>  3.6   |  19<L>  |  1.09    Ca    8.3<L>      09 Sep 2018 19:14  Mg     1.8         TPro  7.4  /  Alb  2.5<L>  /  TBili  0.6  /  DBili  x   /  AST  17  /  ALT  25  /  AlkPhos  89        Urinalysis Basic - ( 09 Sep 2018 19:14 )    Color: Yellow / Appearance: Slightly Turbid / S.020 / pH: x  Gluc: x / Ketone: Large  / Bili: Negative / Urobili: Negative   Blood: x / Protein: 100 / Nitrite: Negative   Leuk Esterase: Moderate / RBC: 2-5 /HPF / WBC 11-25 /HPF   Sq Epi: x / Non Sq Epi: Few /HPF / Bacteria: Moderate /HPF        LIVER FUNCTIONS - ( 09 Sep 2018 19:14 )  Alb: 2.5 g/dL / Pro: 7.4 g/dL / ALK PHOS: 89 U/L / ALT: 25 U/L DA / AST: 17 U/L / GGT: x             PT/INR - ( 09 Sep 2018 19:14 )   PT: 10.3 sec;   INR: 0.95 ratio         PTT - ( 09 Sep 2018 19:14 )  PTT:27.4 sec    LACTATE:Lactate, Blood: 0.8 mmol/L (09-10 @ 01:14)        CULTURES:       RADIOLOGY    CXR:    EXAM:  XR CHEST AP OR PA 1V                            PROCEDURE DATE:  2018          INTERPRETATION:  History: Upper abdominal pain    Technique:  AP portable    Comparisons:  none    Findings:     Subsegmental atelectasis and volume loss inleft lower   lobe.  . Right lung is clear. No pleural effusions.    The pulmonary   vasculature and aorta are normal for age. Heart size is unremarkable.     The thorax is normal for age.    Impression: Subsegmental atelectasis left lower lobe. Mild relative   elevation of left diaphragm          EXAM:  CT ABDOMEN AND PELVIS OC IC                            PROCEDURE DATE:  2018          INTERPRETATION:  Abdominal/Pelvic CT    2018 10:28 PM    Indication: Abdominal pain and nausea, fever and diarrhea for the past 4   days    Technique: Axial images were obtained following oral and IV contrast from   the lung bases through pubic symphysis.       Reformatted coronal and   sagittal images are submitted.    Comparison: None    FINDINGS:    LUNG BASES:  There are no pleural effusions. Cystic bronchiectasis at the   left lung base  PERITONEUM:  There is no free air or focal collection.  No free fluid.  LIVER: At the dome, there is a small lobulated lesion measuring 1.2 cm   with nodular hyperenhancement probably representing hemangioma.   Subcentimeter lucencies too small to characterize..  SPLEEN: Normal.  GALLBLADDER: Contracted.  BILIARY TREE: Unremarkable.  PANCREAS: Normal.  ADRENAL GLANDS: Normal.  KIDNEYS: The left kidney demonstrates striated areas of hypoenhancement  concerning for pyelonephritis. There is a 2 mm stone in the expected   region of the left distal ureter. There are ill-defined lucencies in the   left upper kidney which probably represent microabscesses. There are   scattered cysts in the left lower kidney subcentimeter lucency in the   right kidney too small to characterize.  BOWEL: The stomach is incompletely distended.  Oral contrast is seen as   distally as rectum. There is marked thickening of the duodenum suggesting   duodenitis/peptic ulcer disease. There is no small bowel obstruction or   diverticulitis. The appendix is unremarkable. There is mild thickening of   the transverse and left colon suggesting colitis. Scattered   diverticulosis.    URINARY BLADDER: Collapsed.  PELVIC ORGANS: No pelvic masses.    There is no significant adenopathy. There is diffuse mesenteric edema.  VASCULATURE: The aorta is not dilated. There is mild atherosclerotic   vascular calcification.  RETROPERITONEUM:  There is no mass.  BONES: Unremarkable.  ABDOMINAL WALL: Unremarkable.    IMPRESSION:    Left-sided pyelonephritis with small microabscesses. Mild left renal   pelvic fullness without hydroureter. A 2 mm stone in the course of the   left distal ureter may represent an obstructing stone as opposed to   phlebolith; difficult to assess without ureteral distention or previous   study for comparison. Duodenitis.  Mild colitis. 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 She had lower abdominal pain on thursday morning which she initially thought that is from holding urine for long time or form constipation but it persisted through out day so she went to PCP next day who gave her cipro and fleet enema for constipation. Her pain was dull over right lower quadrant constant, 5/10 in intensity. After enema she had small bowel movement but it did not help her with pain and she also noted some nausea. Denies urinary problems, chest pain, dizziness, vomiting, SOB, BE. (10 Sep 2018 05:59)      PAST MEDICAL & SURGICAL HISTORY:  COPD (chronic obstructive pulmonary disease)  S/P thyroid surgery  S/P RANDOLPH-BSO      Motrin (Swelling)      buDESOnide  80 MICROgram(s)/formoterol 4.5 MICROgram(s) Inhaler 2 Puff(s) Inhalation two times a day  cefTRIAXone   IVPB 1 Gram(s) IV Intermittent every 24 hours  enoxaparin Injectable 40 milliGRAM(s) SubCutaneous daily  metroNIDAZOLE  IVPB 500 milliGRAM(s) IV Intermittent every 8 hours  pantoprazole    Tablet 40 milliGRAM(s) Oral before breakfast  sodium chloride 0.9%. 1000 milliLiter(s) IV Continuous <Continuous>      Social Hx:    FAMILY HISTORY:  No pertinent family history in first degree relatives        ROS  [  ] UNABLE TO ELICIT    General:  [  ] None  [  ] Fever  [  ] Chills  [ x ] Malaise    Skin:  [ x ] None [  ] Rash  [  ] Wound  [  ] Ulcer    HEENT:  [ x ] None  [  ] Sore Throat  [  ] Nasal congestion/ runny nose  [  ] Photophobia [  ] Neck pain      Chest:  [ x ] None   [  ] SOB  [  ] Cough  [  ] None    Cardiovascular:   [ x ] None  [  ] CP  [  ] Palpitation    Gastrointestinal:  [  ] None  [ x ] Abd pain   [  ] Nausea    [  ] Vomiting   [  ] Diarrhea	     Genitourinary:  [ x ] None [  ] Polyuria   [  ] Urgency  [  ] Frequency  [  ] Dysuria    [  ]  Hematuria       Musculoskeletal:  [  ] None [  ] Back Pain	[  ] Body aches  [  ] Joint pain [ x ] Weakness    Neurological: [  ] None [  ]Dizziness  [  ]Visual Disturbance  [  ]Headaches   [ x ] Weakness      PHYSICAL EXAM:    Vital Signs Last 24 Hrs  T(C): 37.6 (10 Sep 2018 07:47), Max: 37.6 (10 Sep 2018 07:47)  T(F): 99.6 (10 Sep 2018 07:47), Max: 99.6 (10 Sep 2018 07:47)  HR: 75 (10 Sep 2018 07:47) (75 - 88)  BP: 125/65 (10 Sep 2018 07:47) (125/62 - 129/63)  BP(mean): --  RR: 16 (10 Sep 2018 07:47) (16 - 18)  SpO2: 100% (10 Sep 2018 07:47) (97% - 100%)    Constitutional:    HEENT: [ x ] Wnl  [  ] Pharyngeal congestion    Neck:  [ x ] Supple  [  ]Lymphadenopathy  [ x ] No JVD   [  ] JVD  [  ] Masses   [  ] WNL    CHEST/Respiratory:  [ x ]Clear to auscultation  [  ] Rales   [  ] Rhonchi   [  ] Wheezing     [  ] Chest Tenderness      Cardiovascular:  [ x ] Reg S1 S2   [  ] Irreg S1 S2   [ x ]No Murmur  [  ] +ve Murmurs  [  ]Systolic [  ]Diastolic      Abdomen:  [ x ] Soft  [  ] No tendrerness  [  ] Tenderness (left CVA)  [  ] Organomegaly  [  ] ABD Distention  [  ] Rigidity                       [ x ] No Regidity                       [ x ] No Rebound Tenderness  [ x ] No Guarding Rigidity  [  ] Rebound Tenderness[  ] Guarding Rigidity                          [ x ]  +ve Bowel Sounds  [  ] Decreased Bowel Sounds    [  ] Absent Bowel Sounds                            Extremities: [ x ] No edema [  ] Edema  [  ] Clubbing   [  ] Cyanosis                         [ x ] No Tender Calf muscles  [  ] Tender Calf muscles                        [ x ] Palpable peripheral pulses    Neurological: [ x ] Awake  [ x ] Alert  [ x ] Oriented  x  3                           [  ] Confused  [  ] Drowsy  [  ] respond to painful stimuli  [  ] Unresponsive    Skin:  [ x ] Intact [  ] Redness [  ] Thrombophlebitis  [  ] Rashes  [  ] Dry  [  ] Ulcers    Ortho:  [  ] Joint Swelling  [  ] Joint erythema [  ] Joint tenderness                [  ] Increased temp. to touch  [  ] DJD [ x ] WNL      LABS/DIAGNOSTIC TESTS                          10.9   15.1  )-----------( 185      ( 09 Sep 2018 19:14 )             33.0     WBC Count: 15.1 K/uL ( @ 19:14)          133<L>  |  99  |  32<H>  ----------------------------<  106<H>  3.6   |  19<L>  |  1.09    Ca    8.3<L>      09 Sep 2018 19:14  Mg     1.8         TPro  7.4  /  Alb  2.5<L>  /  TBili  0.6  /  DBili  x   /  AST  17  /  ALT  25  /  AlkPhos  89        Urinalysis Basic - ( 09 Sep 2018 19:14 )    Color: Yellow / Appearance: Slightly Turbid / S.020 / pH: x  Gluc: x / Ketone: Large  / Bili: Negative / Urobili: Negative   Blood: x / Protein: 100 / Nitrite: Negative   Leuk Esterase: Moderate / RBC: 2-5 /HPF / WBC 11-25 /HPF   Sq Epi: x / Non Sq Epi: Few /HPF / Bacteria: Moderate /HPF        LIVER FUNCTIONS - ( 09 Sep 2018 19:14 )  Alb: 2.5 g/dL / Pro: 7.4 g/dL / ALK PHOS: 89 U/L / ALT: 25 U/L DA / AST: 17 U/L / GGT: x             PT/INR - ( 09 Sep 2018 19:14 )   PT: 10.3 sec;   INR: 0.95 ratio         PTT - ( 09 Sep 2018 19:14 )  PTT:27.4 sec    LACTATE:Lactate, Blood: 0.8 mmol/L (09-10 @ 01:14)        CULTURES:       RADIOLOGY    CXR:    EXAM:  XR CHEST AP OR PA 1V                            PROCEDURE DATE:  2018          INTERPRETATION:  History: Upper abdominal pain    Technique:  AP portable    Comparisons:  none    Findings:     Subsegmental atelectasis and volume loss inleft lower   lobe.  . Right lung is clear. No pleural effusions.    The pulmonary   vasculature and aorta are normal for age. Heart size is unremarkable.     The thorax is normal for age.    Impression: Subsegmental atelectasis left lower lobe. Mild relative   elevation of left diaphragm          EXAM:  CT ABDOMEN AND PELVIS OC IC                            PROCEDURE DATE:  2018          INTERPRETATION:  Abdominal/Pelvic CT    2018 10:28 PM    Indication: Abdominal pain and nausea, fever and diarrhea for the past 4   days    Technique: Axial images were obtained following oral and IV contrast from   the lung bases through pubic symphysis.       Reformatted coronal and   sagittal images are submitted.    Comparison: None    FINDINGS:    LUNG BASES:  There are no pleural effusions. Cystic bronchiectasis at the   left lung base  PERITONEUM:  There is no free air or focal collection.  No free fluid.  LIVER: At the dome, there is a small lobulated lesion measuring 1.2 cm   with nodular hyperenhancement probably representing hemangioma.   Subcentimeter lucencies too small to characterize..  SPLEEN: Normal.  GALLBLADDER: Contracted.  BILIARY TREE: Unremarkable.  PANCREAS: Normal.  ADRENAL GLANDS: Normal.  KIDNEYS: The left kidney demonstrates striated areas of hypoenhancement  concerning for pyelonephritis. There is a 2 mm stone in the expected   region of the left distal ureter. There are ill-defined lucencies in the   left upper kidney which probably represent microabscesses. There are   scattered cysts in the left lower kidney subcentimeter lucency in the   right kidney too small to characterize.  BOWEL: The stomach is incompletely distended.  Oral contrast is seen as   distally as rectum. There is marked thickening of the duodenum suggesting   duodenitis/peptic ulcer disease. There is no small bowel obstruction or   diverticulitis. The appendix is unremarkable. There is mild thickening of   the transverse and left colon suggesting colitis. Scattered   diverticulosis.    URINARY BLADDER: Collapsed.  PELVIC ORGANS: No pelvic masses.    There is no significant adenopathy. There is diffuse mesenteric edema.  VASCULATURE: The aorta is not dilated. There is mild atherosclerotic   vascular calcification.  RETROPERITONEUM:  There is no mass.  BONES: Unremarkable.  ABDOMINAL WALL: Unremarkable.    IMPRESSION:    Left-sided pyelonephritis with small microabscesses. Mild left renal   pelvic fullness without hydroureter. A 2 mm stone in the course of the   left distal ureter may represent an obstructing stone as opposed to   phlebolith; difficult to assess without ureteral distention or previous   study for comparison. Duodenitis.  Mild colitis.

## 2018-09-10 NOTE — PROGRESS NOTE ADULT - PROBLEM SELECTOR PLAN 1
CT abdomen showed colitis/duodenitis  Stool cultures, O&P, and C-diff ordered  Cipro/Flagyl IV ordered  Dr. Ace, ID, consulted   Dr. Meek, GI, called/awaiting

## 2018-09-10 NOTE — CONSULT NOTE ADULT - SUBJECTIVE AND OBJECTIVE BOX
[  ] STAT REQUEST              [ X ] ROUTINE REQUEST    Patient is a 63 year old female with abdominal pain. GI consulted to evaluate.      HPI:  63 year old female presented with 4 days h/o worsening of lower abdominal pain associated with constipation and nausea. Patient cinthia vomiting, hematemesis, hematochezia, melena, fever, chills, chest pain, SOB, palpitation, cough, hematuria, dysuria or diarrhea.           PAIN MANAGEMENT:  Pain Scale:                 /10  Pain Location:  Lower abdominal pain    Prior Colonoscopy:  UNknown    PAST MEDICAL HISTORY  COPD (chronic obstructive pulmonary disease)      PAST SURGICAL HISTORY  Thyroid surgery  RANDOLPH-BSO      Allergies    Motrin (Swelling)         MEDICATIONS  (STANDING):  ALBUTerol    90 MICROgram(s) HFA Inhaler 1 Puff(s) Inhalation every 6 hours  buDESOnide  80 MICROgram(s)/formoterol 4.5 MICROgram(s) Inhaler 2 Puff(s) Inhalation two times a day  cefTRIAXone   IVPB 1 Gram(s) IV Intermittent every 24 hours  enoxaparin Injectable 40 milliGRAM(s) SubCutaneous daily  ferrous    sulfate 325 milliGRAM(s) Oral daily  metroNIDAZOLE  IVPB 500 milliGRAM(s) IV Intermittent every 8 hours  pantoprazole    Tablet 40 milliGRAM(s) Oral before breakfast  sodium chloride 0.9%. 1000 milliLiter(s) (125 mL/Hr) IV Continuous <Continuous>  tamsulosin 0.4 milliGRAM(s) Oral at bedtime         SOCIAL HISTORY  Advanced Directives:       [ X ] Full Code       [  ] DNR  Marital Status:         [ X ] M      [  ] S      [  ] D       [  ] W  Children:       [ X ] Yes      [  ] No  Occupation:        [  ] Employed       [X  ] Unemployed       [  ] Retired  Diet:       [ X ] Regular       [  ] PEG feeding          [  ] NG tube feeding  Drug Use:           [ X ] Patient denied          [  ] Yes  Alcohol:           [ X ] No             [  ] Yes (socially)         [  ] Yes (chronic)  Tobacco:           [  ] Yes           [ X ] No      FAMILY HISTORY  [ X ] Heart Disease            [  ] Diabetes             [  ] HTN             [  ] Colon Cancer             [  ] Stomach Cancer              [  ] Pancreatic Cancer      VITAL SIGNS   Vital Signs Last 24 Hrs  T(C): 37.1 (10 Sep 2018 12:31), Max: 37.6 (10 Sep 2018 07:47)  T(F): 98.8 (10 Sep 2018 12:31), Max: 99.6 (10 Sep 2018 07:47)  HR: 76 (10 Sep 2018 12:31) (75 - 88)  BP: 119/52 (10 Sep 2018 12:31) (119/52 - 129/63)   RR: 16 (10 Sep 2018 12:31) (16 - 18)  SpO2: 99% (10 Sep 2018 12:31) (97% - 100%)    CBC Full  -  ( 10 Sep 2018 15:14 )        WBC Count : 15.5 K/uL  Hemoglobin : 10.5 g/dL  Hematocrit : 32.9 %  Platelet Count - Automated : 188 K/uL  Mean Cell Volume : 89.2 fl  Mean Cell Hemoglobin : 28.5 pg  Mean Cell Hemoglobin Concentration : 32.0 gm/dL  Auto Neutrophil # : x  Auto Lymphocyte # : x  Auto Monocyte # : x  Auto Eosinophil # : x  Auto Basophil # : x  Auto Neutrophil % : x  Auto Lymphocyte % : x  Auto Monocyte % : x  Auto Eosinophil % : x  Auto Basophil % : x      09-10    137  |  106  |  20<H>  ----------------------------<  149<H>  3.4<L>   |  21<L>  |  0.92    Ca    7.6<L>      10 Sep 2018 15:14  Phos  1.6     09-10  Mg     1.7     09-10    TPro  7.4  /  Alb  2.5<L>  /  TBili  0.6  /  DBili  x   /  AST  17  /  ALT  25  /  AlkPhos  89  09-09      Lipase, Serum: 92 U/L (09-09 @ 19:14)     PT/INR - ( 09 Sep 2018 19:14 )   PT: 10.3 sec;   INR: 0.95 ratio    PTT - ( 09 Sep 2018 19:14 )  PTT:27.4 sec    Urinalysis (09.09.18 @ 19:14)    pH Urine: 5.0    Glucose Qualitative, Urine: Negative    Blood, Urine: Moderate    Color: Yellow    Urine Appearance: Slightly Turbid    Bilirubin: Negative    Ketone - Urine: Large    Specific Gravity: 1.020    Protein, Urine: 100    Urobilinogen: Negative    Nitrite: Negative    Leukocyte Esterase Concentration: Moderate      ECG  Ventricular Rate 82 BPM    Atrial Rate 82 BPM    P-R Interval 132 ms    QRS Duration 76 ms    Q-T Interval 340 ms    QTC Calculation(Bezet) 397 ms    P Axis 70 degrees    R Axis 69 degrees    T Axis 62 degrees    Diagnosis Line *** Poor data quality, interpretation may be adversely affected  Normal sinus rhythm  Normal ECG      RADIOLOGY/IMAGING                EXAM:  CT ABDOMEN AND PELVIS OC IC                            PROCEDURE DATE:  09/09/2018          INTERPRETATION:  Abdominal/Pelvic CT    9/9/2018 10:28 PM    Indication: Abdominal pain and nausea, fever and diarrhea for the past 4   days    Technique: Axial images were obtained following oral and IV contrast from   the lung bases through pubic symphysis.       Reformatted coronal and   sagittal images are submitted.    Comparison: None    FINDINGS:    LUNG BASES:  There are no pleural effusions. Cystic bronchiectasis at the   left lung base  PERITONEUM:  There is no free air or focal collection.  No free fluid.  LIVER: At the dome, there is a small lobulated lesion measuring 1.2 cm   with nodular hyperenhancement probably representing hemangioma.   Subcentimeter lucencies too small to characterize..  SPLEEN: Normal.  GALLBLADDER: Contracted.  BILIARY TREE: Unremarkable.  PANCREAS: Normal.  ADRENAL GLANDS: Normal.  KIDNEYS: The left kidney demonstrates striated areas of hypoenhancement  concerning for pyelonephritis. There is a 2 mm stone in the expected   region of the left distal ureter. There are ill-defined lucencies in the   left upper kidney which probably represent microabscesses. There are   scattered cysts in the left lower kidney subcentimeter lucency in the   right kidney too small to characterize.  BOWEL: The stomach is incompletely distended.  Oral contrast is seen as   distally as rectum. There is marked thickening of the duodenum suggesting   duodenitis/peptic ulcer disease. There is no small bowel obstruction or   diverticulitis. The appendix is unremarkable. There is mild thickening of   the transverse and left colon suggesting colitis. Scattered   diverticulosis.    URINARY BLADDER: Collapsed.  PELVIC ORGANS: No pelvic masses.    There is no significant adenopathy. There is diffuse mesenteric edema.  VASCULATURE: The aorta is not dilated. There is mild atherosclerotic   vascular calcification.  RETROPERITONEUM:  There is no mass.  BONES: Unremarkable.  ABDOMINAL WALL: Unremarkable.    IMPRESSION:    Left-sided pyelonephritis with small microabscesses. Mild left renal   pelvic fullness without hydroureter. A 2 mm stone in the course of the   left distal ureter may represent an obstructing stone as opposed to   phlebolith; difficult to assess without ureteral distention or previous   study for comparison. Duodenitis.  Mild colitis.        EXAM:  XR CHEST AP OR PA 1V                            PROCEDURE DATE:  09/09/2018          INTERPRETATION:  History: Upper abdominal pain    Technique:  AP portable    Comparisons:  none    Findings:     Subsegmental atelectasis and volume loss inleft lower   lobe.  . Right lung is clear. No pleural effusions.    The pulmonary   vasculature and aorta are normal for age. Heart size is unremarkable.     The thorax is normal for age.    Impression: Subsegmental atelectasis left lower lobe. Mild relative   elevation of left diaphragm

## 2018-09-10 NOTE — H&P ADULT - HISTORY OF PRESENT ILLNESS
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 She had lower abdominal pain on thursday morning which she initially thought that is from holding urine for long time or form constipation but it persisted through out day so she went to PCP next day who gave her cipro and fleet enema for constipation. Her pain was dull over right lower quadrant constant, 5/10 in intensity. After enema she had small bowel movement but it did not help her with pain and she also noted some nausea. Denies urinary problems, chest pain, dizziness, vomiting, SOB, BE.

## 2018-09-11 DIAGNOSIS — N20.0 CALCULUS OF KIDNEY: ICD-10-CM

## 2018-09-11 LAB
24R-OH-CALCIDIOL SERPL-MCNC: 20.8 NG/ML — LOW (ref 30–80)
ANION GAP SERPL CALC-SCNC: 12 MMOL/L — SIGNIFICANT CHANGE UP (ref 5–17)
BUN SERPL-MCNC: 17 MG/DL — SIGNIFICANT CHANGE UP (ref 7–18)
CALCIUM SERPL-MCNC: 7.7 MG/DL — LOW (ref 8.4–10.5)
CHLORIDE SERPL-SCNC: 108 MMOL/L — SIGNIFICANT CHANGE UP (ref 96–108)
CO2 SERPL-SCNC: 17 MMOL/L — LOW (ref 22–31)
CREAT SERPL-MCNC: 0.85 MG/DL — SIGNIFICANT CHANGE UP (ref 0.5–1.3)
FOLATE SERPL-MCNC: 17.2 NG/ML — SIGNIFICANT CHANGE UP
GLUCOSE SERPL-MCNC: 113 MG/DL — HIGH (ref 70–99)
HBA1C BLD-MCNC: 6 % — HIGH (ref 4–5.6)
HCT VFR BLD CALC: 29.6 % — LOW (ref 34.5–45)
HGB BLD-MCNC: 9.4 G/DL — LOW (ref 11.5–15.5)
MCHC RBC-ENTMCNC: 28.5 PG — SIGNIFICANT CHANGE UP (ref 27–34)
MCHC RBC-ENTMCNC: 31.7 GM/DL — LOW (ref 32–36)
MCV RBC AUTO: 89.9 FL — SIGNIFICANT CHANGE UP (ref 80–100)
PHOSPHATE SERPL-MCNC: 2.2 MG/DL — LOW (ref 2.5–4.5)
PLATELET # BLD AUTO: 192 K/UL — SIGNIFICANT CHANGE UP (ref 150–400)
POTASSIUM SERPL-MCNC: 3.7 MMOL/L — SIGNIFICANT CHANGE UP (ref 3.5–5.3)
POTASSIUM SERPL-SCNC: 3.7 MMOL/L — SIGNIFICANT CHANGE UP (ref 3.5–5.3)
RBC # BLD: 3.29 M/UL — LOW (ref 3.8–5.2)
RBC # FLD: 14 % — SIGNIFICANT CHANGE UP (ref 10.3–14.5)
SODIUM SERPL-SCNC: 137 MMOL/L — SIGNIFICANT CHANGE UP (ref 135–145)
VIT B12 SERPL-MCNC: 925 PG/ML — SIGNIFICANT CHANGE UP (ref 232–1245)
WBC # BLD: 15.7 K/UL — HIGH (ref 3.8–10.5)
WBC # FLD AUTO: 15.7 K/UL — HIGH (ref 3.8–10.5)

## 2018-09-11 PROCEDURE — 76775 US EXAM ABDO BACK WALL LIM: CPT | Mod: 26

## 2018-09-11 RX ORDER — ERGOCALCIFEROL 1.25 MG/1
50000 CAPSULE ORAL
Qty: 0 | Refills: 0 | Status: DISCONTINUED | OUTPATIENT
Start: 2018-09-11 | End: 2018-09-17

## 2018-09-11 RX ORDER — LANOLIN ALCOHOL/MO/W.PET/CERES
3 CREAM (GRAM) TOPICAL AT BEDTIME
Qty: 0 | Refills: 0 | Status: DISCONTINUED | OUTPATIENT
Start: 2018-09-11 | End: 2018-09-17

## 2018-09-11 RX ORDER — ACETAMINOPHEN 500 MG
650 TABLET ORAL EVERY 6 HOURS
Qty: 0 | Refills: 0 | Status: DISCONTINUED | OUTPATIENT
Start: 2018-09-11 | End: 2018-09-17

## 2018-09-11 RX ORDER — ZOLPIDEM TARTRATE 10 MG/1
5 TABLET ORAL AT BEDTIME
Qty: 0 | Refills: 0 | Status: DISCONTINUED | OUTPATIENT
Start: 2018-09-11 | End: 2018-09-17

## 2018-09-11 RX ADMIN — ZOLPIDEM TARTRATE 5 MILLIGRAM(S): 10 TABLET ORAL at 22:59

## 2018-09-11 RX ADMIN — ERGOCALCIFEROL 50000 UNIT(S): 1.25 CAPSULE ORAL at 14:31

## 2018-09-11 RX ADMIN — BUDESONIDE AND FORMOTEROL FUMARATE DIHYDRATE 2 PUFF(S): 160; 4.5 AEROSOL RESPIRATORY (INHALATION) at 12:37

## 2018-09-11 RX ADMIN — TAMSULOSIN HYDROCHLORIDE 0.4 MILLIGRAM(S): 0.4 CAPSULE ORAL at 21:42

## 2018-09-11 RX ADMIN — PANTOPRAZOLE SODIUM 40 MILLIGRAM(S): 20 TABLET, DELAYED RELEASE ORAL at 06:38

## 2018-09-11 RX ADMIN — ENOXAPARIN SODIUM 40 MILLIGRAM(S): 100 INJECTION SUBCUTANEOUS at 12:36

## 2018-09-11 RX ADMIN — Medication 325 MILLIGRAM(S): at 11:40

## 2018-09-11 RX ADMIN — BUDESONIDE AND FORMOTEROL FUMARATE DIHYDRATE 2 PUFF(S): 160; 4.5 AEROSOL RESPIRATORY (INHALATION) at 21:43

## 2018-09-11 RX ADMIN — Medication 100 MILLIGRAM(S): at 21:42

## 2018-09-11 RX ADMIN — Medication 100 MILLIGRAM(S): at 14:31

## 2018-09-11 RX ADMIN — Medication 650 MILLIGRAM(S): at 22:59

## 2018-09-11 RX ADMIN — CEFTRIAXONE 100 GRAM(S): 500 INJECTION, POWDER, FOR SOLUTION INTRAMUSCULAR; INTRAVENOUS at 06:42

## 2018-09-11 RX ADMIN — Medication 100 MILLIGRAM(S): at 05:18

## 2018-09-11 NOTE — PROGRESS NOTE ADULT - PROBLEM SELECTOR PLAN 2
f/u  stool for ova and parasites.  f/u Stool for culture.  f/u stool for CDT.  continue with colitis

## 2018-09-11 NOTE — PROGRESS NOTE ADULT - SUBJECTIVE AND OBJECTIVE BOX
Meds:  cefTRIAXone   IVPB 1 Gram(s) IV Intermittent every 24 hours  metroNIDAZOLE  IVPB 500 milliGRAM(s) IV Intermittent every 8 hours    Allergies:  Allergies    Motrin (Swelling)    Intolerances      ROS  [  ] UNABLE TO ELICIT    General:  [  ] None  [  ] Fever  [  ] Chills  [ x ] Malaise    Skin:  [ x ] None [  ] Rash  [  ] Wound  [  ] Ulcer    HEENT:  [ x ] None  [  ] Sore Throat  [  ] Nasal congestion/ runny nose  [  ] Photophobia [  ] Neck pain      Chest:  [ x ] None   [  ] SOB  [  ] Cough  [  ] None    Cardiovascular:   [ x ] None  [  ] CP  [  ] Palpitation    Gastrointestinal:  [  ] None  [ x ] Abd pain   [  ] Nausea    [  ] Vomiting   [  ] Diarrhea	     Genitourinary:  [ x ] None [  ] Polyuria   [  ] Urgency  [  ] Frequency  [  ] Dysuria    [  ]  Hematuria       Musculoskeletal:  [  ] None [  ] Back Pain	[  ] Body aches  [  ] Joint pain [ x ] Weakness    Neurological: [  ] None [  ]Dizziness  [  ]Visual Disturbance  [  ]Headaches   [ x ] Weakness          PHYSICAL EXAM:    Vital Signs Last 24 Hrs  T(C): 37.2 (11 Sep 2018 05:12), Max: 37.2 (11 Sep 2018 05:12)  T(F): 98.9 (11 Sep 2018 05:12), Max: 98.9 (11 Sep 2018 05:12)  HR: 79 (11 Sep 2018 05:12) (76 - 80)  BP: 122/69 (11 Sep 2018 05:12) (119/52 - 139/73)  BP(mean): --  RR: 16 (11 Sep 2018 05:12) (16 - 18)  SpO2: 98% (11 Sep 2018 05:12) (98% - 100%)    Constitutional:    HEENT: [ x ] Wnl  [  ] Pharyngeal congestion    Neck:  [ x ] Supple  [  ]Lymphadenopathy  [ x ] No JVD   [  ] JVD  [  ] Masses   [  ] WNL    CHEST/Respiratory:  [ x ]Clear to auscultation  [  ] Rales   [  ] Rhonchi   [  ] Wheezing     [  ] Chest Tenderness      Cardiovascular:  [ x ] Reg S1 S2   [  ] Irreg S1 S2   [ x ]No Murmur  [  ] +ve Murmurs  [  ]Systolic [  ]Diastolic      Abdomen:  [ x ] Soft  [  ] No tendrerness  [  ] Tenderness (left CVA)  [  ] Organomegaly  [  ] ABD Distention  [  ] Rigidity                       [ x ] No Regidity                       [ x ] No Rebound Tenderness  [ x ] No Guarding Rigidity  [  ] Rebound Tenderness[  ] Guarding Rigidity                          [ x ]  +ve Bowel Sounds  [  ] Decreased Bowel Sounds    [  ] Absent Bowel Sounds                            Extremities: [ x ] No edema [  ] Edema  [  ] Clubbing   [  ] Cyanosis                         [ x ] No Tender Calf muscles  [  ] Tender Calf muscles                        [ x ] Palpable peripheral pulses    Neurological: [ x ] Awake  [ x ] Alert  [ x ] Oriented  x  3                           [  ] Confused  [  ] Drowsy  [  ] respond to painful stimuli  [  ] Unresponsive    Skin:  [ x ] Intact [  ] Redness [  ] Thrombophlebitis  [  ] Rashes  [  ] Dry  [  ] Ulcers    Ortho:  [  ] Joint Swelling  [  ] Joint erythema [  ] Joint tenderness                [  ] Increased temp. to touch  [  ] DJD [ x ] WNL          LABS/DIAGNOSTIC TESTS                          10.5   15.5  )-----------( 188      ( 10 Sep 2018 15:14 )             32.9         09-10    137  |  106  |  20<H>  ----------------------------<  149<H>  3.4<L>   |  21<L>  |  0.92    Ca    7.6<L>      10 Sep 2018 15:14  Phos  1.6     09-10  Mg     1.7     09-10    TPro  7.4  /  Alb  2.5<L>  /  TBili  0.6  /  DBili  x   /  AST  17  /  ALT  25  /  AlkPhos  89  09-09      LIVER FUNCTIONS - ( 09 Sep 2018 19:14 )  Alb: 2.5 g/dL / Pro: 7.4 g/dL / ALK PHOS: 89 U/L / ALT: 25 U/L DA / AST: 17 U/L / GGT: x               CULTURES:   Culture - Urine (09.09.18 @ 21:33)    Specimen Source: .Urine Clean Catch (Midstream)    Culture Results:   <10,000 CFU/ml Normal Urogenital zehra present      RADIOLOGY    CXR:    EXAM:  XR CHEST AP OR PA 1V                            PROCEDURE DATE:  09/09/2018          INTERPRETATION:  History: Upper abdominal pain    Technique:  AP portable    Comparisons:  none    Findings:     Subsegmental atelectasis and volume loss inleft lower   lobe.  . Right lung is clear. No pleural effusions.    The pulmonary   vasculature and aorta are normal for age. Heart size is unremarkable.     The thorax is normal for age.    Impression: Subsegmental atelectasis left lower lobe. Mild relative   elevation of left diaphragm          EXAM:  CT ABDOMEN AND PELVIS OC IC                            PROCEDURE DATE:  09/09/2018          INTERPRETATION:  Abdominal/Pelvic CT    9/9/2018 10:28 PM    Indication: Abdominal pain and nausea, fever and diarrhea for the past 4   days    Technique: Axial images were obtained following oral and IV contrast from   the lung bases through pubic symphysis.       Reformatted coronal and   sagittal images are submitted.    Comparison: None    FINDINGS:    LUNG BASES:  There are no pleural effusions. Cystic bronchiectasis at the   left lung base  PERITONEUM:  There is no free air or focal collection.  No free fluid.  LIVER: At the dome, there is a small lobulated lesion measuring 1.2 cm   with nodular hyperenhancement probably representing hemangioma.   Subcentimeter lucencies too small to characterize..  SPLEEN: Normal.  GALLBLADDER: Contracted.  BILIARY TREE: Unremarkable.  PANCREAS: Normal.  ADRENAL GLANDS: Normal.  KIDNEYS: The left kidney demonstrates striated areas of hypoenhancement  concerning for pyelonephritis. There is a 2 mm stone in the expected   region of the left distal ureter. There are ill-defined lucencies in the   left upper kidney which probably represent microabscesses. There are   scattered cysts in the left lower kidney subcentimeter lucency in the   right kidney too small to characterize.  BOWEL: The stomach is incompletely distended.  Oral contrast is seen as   distally as rectum. There is marked thickening of the duodenum suggesting   duodenitis/peptic ulcer disease. There is no small bowel obstruction or   diverticulitis. The appendix is unremarkable. There is mild thickening of   the transverse and left colon suggesting colitis. Scattered   diverticulosis.    URINARY BLADDER: Collapsed.  PELVIC ORGANS: No pelvic masses.    There is no significant adenopathy. There is diffuse mesenteric edema.  VASCULATURE: The aorta is not dilated. There is mild atherosclerotic   vascular calcification.  RETROPERITONEUM:  There is no mass.  BONES: Unremarkable.  ABDOMINAL WALL: Unremarkable.    IMPRESSION:    Left-sided pyelonephritis with small microabscesses. Mild left renal   pelvic fullness without hydroureter. A 2 mm stone in the course of the   left distal ureter may represent an obstructing stone as opposed to   phlebolith; difficult to assess without ureteral distention or previous   study for comparison. Duodenitis.  Mild colitis.    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 She had lower abdominal pain on thursday morning which she initially thought that is from holding urine for long time or form constipation but it persisted through out day so she went to PCP next day who gave her Cipro and fleet enema for constipation.  Ct abdomen suggested left pyelonephritis and mild colitis.  Patient was started on IV Rocephin and Flagyl.    Problem/Recommendation - 1:  Problem: Pyelonephritis.   Recommendation:   1- UA & CS.  2- Blood culture.  3- Renal and bladder sonogram.  4- Continue Rocephin 1 gm IVPB q day.  5- IV hydration.  6- CBC and BMP follow up.  7- Continue IV Flagyl for colitis.  8- Urology management and follow up.    Problem/Recommendation - 2:  ·  Problem: Colitis.    Recommendation:   1- IV hydration.  2- stool for ova and parasites.  3- Stool for culture.  4- stool for CDT.  5- CBC & BMP follow up.   6- ABX as per problem #1.     Problem/Recommendation - 3:  ·  Problem: COPD (chronic obstructive pulmonary disease).    Recommendation:   1- Bronchodilators.  2- O2 as needed.  3- Pulmonary evaluation and management.  4- Steroids as per primary, and pulmonary team if needed.     Problem/Recommendation - 4:  ·  Problem: Anemia.    Recommendation:   1- Anemia profile.  2- Iron supplements.  3- Closely monitor H & H.  4- Observe for bleeding.     Discussed with NP.

## 2018-09-11 NOTE — PROGRESS NOTE ADULT - SUBJECTIVE AND OBJECTIVE BOX
[   ] ICU                                          [   ] CCU                                      [  X ] Medical Floor    Patient is comfortable. No new complaints reported, No abdominal pain, N/V, hematemesis, hematochezia, melena, fever, chills, chest pain, SOB, cough or diarrhea reported.    VITALS  Vital Signs Last 24 Hrs  T(C): 37 (11 Sep 2018 13:20), Max: 37.2 (11 Sep 2018 05:12)  T(F): 98.6 (11 Sep 2018 13:20), Max: 98.9 (11 Sep 2018 05:12)  HR: 86 (11 Sep 2018 13:20) (77 - 86)  BP: 134/58 (11 Sep 2018 13:20) (122/69 - 139/73)   RR: 16 (11 Sep 2018 13:20) (16 - 18)  SpO2: 98% (11 Sep 2018 13:20) (98% - 100%)       MEDICATIONS  (STANDING):  ALBUTerol    90 MICROgram(s) HFA Inhaler 1 Puff(s) Inhalation every 6 hours  buDESOnide  80 MICROgram(s)/formoterol 4.5 MICROgram(s) Inhaler 2 Puff(s) Inhalation two times a day  cefTRIAXone   IVPB 1 Gram(s) IV Intermittent every 24 hours  enoxaparin Injectable 40 milliGRAM(s) SubCutaneous daily  ergocalciferol 32529 Unit(s) Oral <User Schedule>  ferrous    sulfate 325 milliGRAM(s) Oral daily  melatonin 3 milliGRAM(s) Oral at bedtime  metroNIDAZOLE  IVPB 500 milliGRAM(s) IV Intermittent every 8 hours  pantoprazole    Tablet 40 milliGRAM(s) Oral before breakfast  sodium chloride 0.9%. 1000 milliLiter(s) (125 mL/Hr) IV Continuous <Continuous>  tamsulosin 0.4 milliGRAM(s) Oral at bedtime    MEDICATIONS  (PRN):                            9.4    15.7  )-----------( 192      ( 11 Sep 2018 07:42 )             29.6       09-11    137  |  108  |  17  ----------------------------<  113<H>  3.7   |  17<L>  |  0.85    Ca    7.7<L>      11 Sep 2018 07:42  Phos  2.2     09-11  Mg     1.7     09-10    TPro  7.4  /  Alb  2.5<L>  /  TBili  0.6  /  DBili  x   /  AST  17  /  ALT  25  /  AlkPhos  89  09-09      PT/INR - ( 09 Sep 2018 19:14 )   PT: 10.3 sec;   INR: 0.95 ratio         PTT - ( 09 Sep 2018 19:14 )  PTT:27.4 sec

## 2018-09-11 NOTE — PROGRESS NOTE ADULT - PROBLEM SELECTOR PLAN 6
CT scan showed 2mm left ureteral stone and microabscesses  may need stent placement  Urology follow up.   continue with tamsulosin  IV Hydration

## 2018-09-11 NOTE — PROGRESS NOTE ADULT - PROBLEM SELECTOR PLAN 1
qSOFA 0  presented with back pain, abdominal pain, nausea  reports that she frequently holds urine   CT abdomen/pelvis: colitis and left pyelonephritis with microabscess noted    continue  rocephin and flagyl   Urine culture was negative  f/u blood culture  F/U renal u/s. Patient refused bladder u/s as she couldn't hold urine.  ID Dr. Ace

## 2018-09-11 NOTE — PROGRESS NOTE ADULT - SUBJECTIVE AND OBJECTIVE BOX
Patient is a 63y old  Female who presents with a chief complaint of lower abdominal pain for 4 days (11 Sep 2018 10:02)  Awake, alert, comfortable in bed in NAD. No sob or cough. May go to OR for ureteral stent placement    INTERVAL HPI/OVERNIGHT EVENTS:      VITAL SIGNS:  T(F): 98.6 (18 @ 13:20)  HR: 86 (18 @ 13:20)  BP: 134/58 (18 @ 13:20)  RR: 16 (18 @ 13:20)  SpO2: 98% (18 @ 13:20)  Wt(kg): --  I&O's Detail          REVIEW OF SYSTEMS:    CONSTITUTIONAL:  No fevers, chills, sweats    HEENT:  Eyes:  No diplopia or blurred vision. ENT:  No earache, sore throat or runny nose.    CARDIOVASCULAR:  No pressure, squeezing, tightness, or heaviness about the chest; no palpitations.    RESPIRATORY:  Per HPI    GASTROINTESTINAL:  No abdominal pain, nausea, vomiting or diarrhea.    GENITOURINARY:  No dysuria, frequency or urgency.    NEUROLOGIC:  No paresthesias, fasciculations, seizures or weakness.    PSYCHIATRIC:  No disorder of thought or mood.      PHYSICAL EXAM:    Constitutional: Well developed and nourished  Eyes:Perrla  ENMT: normal  Neck:supple  Respiratory: good air entry  Cardiovascular: S1 S2 regular  Gastrointestinal: Soft, Non tender  Extremities: No edema  Vascular:normal  Neurological:Awake, alert,Ox3  Musculoskeletal:Normal      MEDICATIONS  (STANDING):  ALBUTerol    90 MICROgram(s) HFA Inhaler 1 Puff(s) Inhalation every 6 hours  buDESOnide  80 MICROgram(s)/formoterol 4.5 MICROgram(s) Inhaler 2 Puff(s) Inhalation two times a day  cefTRIAXone   IVPB 1 Gram(s) IV Intermittent every 24 hours  enoxaparin Injectable 40 milliGRAM(s) SubCutaneous daily  ergocalciferol 22672 Unit(s) Oral <User Schedule>  ferrous    sulfate 325 milliGRAM(s) Oral daily  melatonin 3 milliGRAM(s) Oral at bedtime  metroNIDAZOLE  IVPB 500 milliGRAM(s) IV Intermittent every 8 hours  pantoprazole    Tablet 40 milliGRAM(s) Oral before breakfast  sodium chloride 0.9%. 1000 milliLiter(s) (125 mL/Hr) IV Continuous <Continuous>  tamsulosin 0.4 milliGRAM(s) Oral at bedtime    MEDICATIONS  (PRN):      Allergies    Motrin (Swelling)    Intolerances        LABS:                        9.4    15.7  )-----------( 192      ( 11 Sep 2018 07:42 )             29.6         137  |  108  |  17  ----------------------------<  113<H>  3.7   |  17<L>  |  0.85    Ca    7.7<L>      11 Sep 2018 07:42  Phos  2.2       Mg     1.7     09-10    TPro  7.4  /  Alb  2.5<L>  /  TBili  0.6  /  DBili  x   /  AST  17  /  ALT  25  /  AlkPhos  89  -    PT/INR - ( 09 Sep 2018 19:14 )   PT: 10.3 sec;   INR: 0.95 ratio         PTT - ( 09 Sep 2018 19:14 )  PTT:27.4 sec  Urinalysis Basic - ( 09 Sep 2018 19:14 )    Color: Yellow / Appearance: Slightly Turbid / S.020 / pH: x  Gluc: x / Ketone: Large  / Bili: Negative / Urobili: Negative   Blood: x / Protein: 100 / Nitrite: Negative   Leuk Esterase: Moderate / RBC: 2-5 /HPF / WBC 11-25 /HPF   Sq Epi: x / Non Sq Epi: Few /HPF / Bacteria: Moderate /HPF        CARDIAC MARKERS ( 09 Sep 2018 19:14 )  <0.015 ng/mL / x     / 47 U/L / x     / <1.0 ng/mL      CAPILLARY BLOOD GLUCOSE            RADIOLOGY & ADDITIONAL TESTS:    CXR:    Ct scan chest:    ekg;    echo:

## 2018-09-11 NOTE — PROGRESS NOTE ADULT - SUBJECTIVE AND OBJECTIVE BOX
Patient is a 63y old  Female who presents with a chief complaint of lower abdominal pain for 4 days (11 Sep 2018 09:32)    pt seen in icu [  ], reg med floor [   ], bed [  ], chair at bedside [   ], a+o x3 [  ], lethargic [  ],  nad [  ]    hendrickson [  ], ngt [  ], peg [  ], et tube [  ], cent line [  ], picc line [  ]        Allergies    Motrin (Swelling)        Vitals    T(F): 98.9 (09-11-18 @ 05:12), Max: 98.9 (09-11-18 @ 05:12)  HR: 79 (09-11-18 @ 05:12) (76 - 80)  BP: 122/69 (09-11-18 @ 05:12) (119/52 - 139/73)  RR: 16 (09-11-18 @ 05:12) (16 - 18)  SpO2: 98% (09-11-18 @ 05:12) (98% - 100%)  Wt(kg): --  CAPILLARY BLOOD GLUCOSE          Labs                          9.4    15.7  )-----------( 192      ( 11 Sep 2018 07:42 )             29.6       09-11    137  |  108  |  17  ----------------------------<  113<H>  3.7   |  17<L>  |  0.85    Ca    7.7<L>      11 Sep 2018 07:42  Phos  2.2     09-11  Mg     1.7     09-10    TPro  7.4  /  Alb  2.5<L>  /  TBili  0.6  /  DBili  x   /  AST  17  /  ALT  25  /  AlkPhos  89  09-09      CARDIAC MARKERS ( 09 Sep 2018 19:14 )  <0.015 ng/mL / x     / 47 U/L / x     / <1.0 ng/mL        .Urine Clean Catch (Midstream)  09-09 @ 21:33   <10,000 CFU/ml Normal Urogenital zehra present  --  --          Radiology Results      Meds    MEDICATIONS  (STANDING):  ALBUTerol    90 MICROgram(s) HFA Inhaler 1 Puff(s) Inhalation every 6 hours  buDESOnide  80 MICROgram(s)/formoterol 4.5 MICROgram(s) Inhaler 2 Puff(s) Inhalation two times a day  cefTRIAXone   IVPB 1 Gram(s) IV Intermittent every 24 hours  enoxaparin Injectable 40 milliGRAM(s) SubCutaneous daily  ferrous    sulfate 325 milliGRAM(s) Oral daily  melatonin 3 milliGRAM(s) Oral at bedtime  metroNIDAZOLE  IVPB 500 milliGRAM(s) IV Intermittent every 8 hours  pantoprazole    Tablet 40 milliGRAM(s) Oral before breakfast  sodium chloride 0.9%. 1000 milliLiter(s) (125 mL/Hr) IV Continuous <Continuous>  tamsulosin 0.4 milliGRAM(s) Oral at bedtime      MEDICATIONS  (PRN):      Physical Exam    Neuro :  no focal deficits  Respiratory: CTA B/L  CV: RRR, S1S2, no murmurs,   Abdominal: Soft, NT, ND +BS,  Extremities: No edema, + peripheral pulses    ASSESSMENT    Noninfectious gastroenteritis  COPD (chronic obstructive pulmonary disease)  S/P thyroid surgery  S/P RANDOLPH-BSO      PLAN Patient is a 63y old  Female who presents with a chief complaint of lower abdominal pain for 4 days (11 Sep 2018 09:32)    pt seen in icu [  ], reg med floor [   ], bed [  ], chair at bedside [   ], a+o x3 [  ], lethargic [  ],  nad [  ]    hendrickson [  ], ngt [  ], peg [  ], et tube [  ], cent line [  ], picc line [  ]        Allergies    Motrin (Swelling)        Vitals    T(F): 98.9 (09-11-18 @ 05:12), Max: 98.9 (09-11-18 @ 05:12)  HR: 79 (09-11-18 @ 05:12) (76 - 80)  BP: 122/69 (09-11-18 @ 05:12) (119/52 - 139/73)  RR: 16 (09-11-18 @ 05:12) (16 - 18)  SpO2: 98% (09-11-18 @ 05:12) (98% - 100%)  Wt(kg): --  CAPILLARY BLOOD GLUCOSE          Labs                          9.4    15.7  )-----------( 192      ( 11 Sep 2018 07:42 )             29.6       09-11    137  |  108  |  17  ----------------------------<  113<H>  3.7   |  17<L>  |  0.85    Ca    7.7<L>      11 Sep 2018 07:42  Phos  2.2     09-11  Mg     1.7     09-10    TPro  7.4  /  Alb  2.5<L>  /  TBili  0.6  /  DBili  x   /  AST  17  /  ALT  25  /  AlkPhos  89  09-09      CARDIAC MARKERS ( 09 Sep 2018 19:14 )  <0.015 ng/mL / x     / 47 U/L / x     / <1.0 ng/mL        .Urine Clean Catch (Midstream)  09-09 @ 21:33   <10,000 CFU/ml Normal Urogenital zehra present  --  --          Radiology Results      Meds    MEDICATIONS  (STANDING):  ALBUTerol    90 MICROgram(s) HFA Inhaler 1 Puff(s) Inhalation every 6 hours  buDESOnide  80 MICROgram(s)/formoterol 4.5 MICROgram(s) Inhaler 2 Puff(s) Inhalation two times a day  cefTRIAXone   IVPB 1 Gram(s) IV Intermittent every 24 hours  enoxaparin Injectable 40 milliGRAM(s) SubCutaneous daily  ferrous    sulfate 325 milliGRAM(s) Oral daily  melatonin 3 milliGRAM(s) Oral at bedtime  metroNIDAZOLE  IVPB 500 milliGRAM(s) IV Intermittent every 8 hours  pantoprazole    Tablet 40 milliGRAM(s) Oral before breakfast  sodium chloride 0.9%. 1000 milliLiter(s) (125 mL/Hr) IV Continuous <Continuous>  tamsulosin 0.4 milliGRAM(s) Oral at bedtime      MEDICATIONS  (PRN):      Physical Exam    Neuro :  no focal deficits  Respiratory: CTA B/L  CV: RRR, S1S2, no murmurs,   Abdominal: Soft, NT, ND +BS,  Extremities: No edema, + peripheral pulses    ASSESSMENT    left pyelonephritis with micro abcesses,   left distal ureteral stone,   colitis,   duodenitis,   anemia,   h/o asthma  COPD (chronic obstructive pulmonary disease)  S/P thyroid surgery  S/P RANDOLPH-BSO      PLAN    cont rocephin an, flagyl,   cont flomax,   urology f/u noted  pt to possibly have cystoscopy with stent placement  pt with low risk for periop cardiac complication for the proposed cystoscopy procedure  id f/u noted  f/u blood cx  u cx neg noted above  f/u renal bladder sono  f/u anemia panel  gi cons noted  f/u egd  f/u stool studies including c diff  pulm f/u  cont current meds

## 2018-09-11 NOTE — PROGRESS NOTE ADULT - SUBJECTIVE AND OBJECTIVE BOX
PGY 1 Note discussed with supervising resident and primary attending    Patient is a 63y old  Female who presents with a chief complaint of lower abdominal pain for 4 days (11 Sep 2018 15:53)      INTERVAL HPI/OVERNIGHT EVENTS: Patient is complaining of diarrhea and abdominal fullness. Soreness in the belly.    MEDICATIONS  (STANDING):  ALBUTerol    90 MICROgram(s) HFA Inhaler 1 Puff(s) Inhalation every 6 hours  buDESOnide  80 MICROgram(s)/formoterol 4.5 MICROgram(s) Inhaler 2 Puff(s) Inhalation two times a day  cefTRIAXone   IVPB 1 Gram(s) IV Intermittent every 24 hours  enoxaparin Injectable 40 milliGRAM(s) SubCutaneous daily  ergocalciferol 98753 Unit(s) Oral <User Schedule>  ferrous    sulfate 325 milliGRAM(s) Oral daily  melatonin 3 milliGRAM(s) Oral at bedtime  metroNIDAZOLE  IVPB 500 milliGRAM(s) IV Intermittent every 8 hours  pantoprazole    Tablet 40 milliGRAM(s) Oral before breakfast  sodium chloride 0.9%. 1000 milliLiter(s) (125 mL/Hr) IV Continuous <Continuous>  tamsulosin 0.4 milliGRAM(s) Oral at bedtime    MEDICATIONS  (PRN):      __________________________________________________  REVIEW OF SYSTEMS:    CONSTITUTIONAL: No fever,   EYES: no acute visual disturbances  NECK: No pain or stiffness  RESPIRATORY: No cough; No shortness of breath  CARDIOVASCULAR: No chest pain, no palpitations  GASTROINTESTINAL:  pain. No nausea or vomiting;  diarrhea   NEUROLOGICAL: No headache or numbness, no tremors  MUSCULOSKELETAL: No joint pain, no muscle pain  GENITOURINARY: no dysuria, no frequency, no hesitancy  PSYCHIATRY: no depression , no anxiety  ALL OTHER  ROS negative        Vital Signs Last 24 Hrs  T(C): 37 (11 Sep 2018 13:20), Max: 37.2 (11 Sep 2018 05:12)  T(F): 98.6 (11 Sep 2018 13:20), Max: 98.9 (11 Sep 2018 05:12)  HR: 86 (11 Sep 2018 13:20) (77 - 86)  BP: 134/58 (11 Sep 2018 13:20) (122/69 - 139/73)  BP(mean): --  RR: 16 (11 Sep 2018 13:20) (16 - 18)  SpO2: 98% (11 Sep 2018 13:20) (98% - 100%)    ________________________________________________  PHYSICAL EXAM:  GENERAL: NAD  HEENT: Normocephalic;  conjunctivae and sclerae clear; moist mucous membranes;   NECK : supple  CHEST/LUNG: Clear to auscultation bilaterally with good air entry   HEART: S1 S2  regular; no murmurs, gallops or rubs  ABDOMEN: Soft, tender, Nondistended; Bowel sounds present  EXTREMITIES: no cyanosis; no edema; no calf tenderness  SKIN: warm and dry; no rash  NERVOUS SYSTEM:  Awake and alert; Oriented  to place, person and time ; no new deficits    _________________________________________________  LABS:                        9.4    15.7  )-----------( 192      ( 11 Sep 2018 07:42 )             29.6     -    137  |  108  |  17  ----------------------------<  113<H>  3.7   |  17<L>  |  0.85    Ca    7.7<L>      11 Sep 2018 07:42  Phos  2.2       Mg     1.7     09-10    TPro  7.4  /  Alb  2.5<L>  /  TBili  0.6  /  DBili  x   /  AST  17  /  ALT  25  /  AlkPhos  89  -    PT/INR - ( 09 Sep 2018 19:14 )   PT: 10.3 sec;   INR: 0.95 ratio         PTT - ( 09 Sep 2018 19:14 )  PTT:27.4 sec  Urinalysis Basic - ( 09 Sep 2018 19:14 )    Color: Yellow / Appearance: Slightly Turbid / S.020 / pH: x  Gluc: x / Ketone: Large  / Bili: Negative / Urobili: Negative   Blood: x / Protein: 100 / Nitrite: Negative   Leuk Esterase: Moderate / RBC: 2-5 /HPF / WBC 11-25 /HPF   Sq Epi: x / Non Sq Epi: Few /HPF / Bacteria: Moderate /HPF      CAPILLARY BLOOD GLUCOSE            RADIOLOGY & ADDITIONAL TESTS:    Imaging Personally Reviewed:  YES    Consultant(s) Notes Reviewed:   YES    Care Discussed with Consultants : YES     Plan of care was discussed with patient and /or primary care giver; all questions and concerns were addressed and care was aligned with patient's wishes.

## 2018-09-11 NOTE — PROGRESS NOTE ADULT - SUBJECTIVE AND OBJECTIVE BOX
Pt s- new complaints. Denies hematuria or dysuria. no flank pain. 'i think my pain is from something I ate" Admits loose stools today. Admits feeling bloated. no n/v  vss a/f  A7Ox3 nad  Abd: soft nt nd no cvat,                          9.4    15.7  )-----------( 192      ( 11 Sep 2018 07:42 )             29.6     09-11    137  |  108  |  17  ----------------------------<  113<H>  3.7   |  17<L>  |  0.85    Ca    7.7<L>      11 Sep 2018 07:42  Phos  2.2     09-11  Mg     1.7     09-10    TPro  7.4  /  Alb  2.5<L>  /  TBili  0.6  /  DBili  x   /  AST  17  /  ALT  25  /  AlkPhos  89  09-09

## 2018-09-12 LAB
ANION GAP SERPL CALC-SCNC: 9 MMOL/L — SIGNIFICANT CHANGE UP (ref 5–17)
BASOPHILS # BLD AUTO: 0.1 K/UL — SIGNIFICANT CHANGE UP (ref 0–0.2)
BASOPHILS NFR BLD AUTO: 0.8 % — SIGNIFICANT CHANGE UP (ref 0–2)
BUN SERPL-MCNC: 11 MG/DL — SIGNIFICANT CHANGE UP (ref 7–18)
C DIFF BY PCR RESULT: SIGNIFICANT CHANGE UP
C DIFF TOX GENS STL QL NAA+PROBE: SIGNIFICANT CHANGE UP
CALCIUM SERPL-MCNC: 7.7 MG/DL — LOW (ref 8.4–10.5)
CHLORIDE SERPL-SCNC: 111 MMOL/L — HIGH (ref 96–108)
CO2 SERPL-SCNC: 19 MMOL/L — LOW (ref 22–31)
CREAT SERPL-MCNC: 0.84 MG/DL — SIGNIFICANT CHANGE UP (ref 0.5–1.3)
EOSINOPHIL # BLD AUTO: 0.1 K/UL — SIGNIFICANT CHANGE UP (ref 0–0.5)
EOSINOPHIL NFR BLD AUTO: 0.9 % — SIGNIFICANT CHANGE UP (ref 0–6)
GLUCOSE SERPL-MCNC: 113 MG/DL — HIGH (ref 70–99)
HCT VFR BLD CALC: 28.4 % — LOW (ref 34.5–45)
HGB BLD-MCNC: 9.1 G/DL — LOW (ref 11.5–15.5)
LYMPHOCYTES # BLD AUTO: 15.3 % — SIGNIFICANT CHANGE UP (ref 13–44)
LYMPHOCYTES # BLD AUTO: 2.1 K/UL — SIGNIFICANT CHANGE UP (ref 1–3.3)
MCHC RBC-ENTMCNC: 28.8 PG — SIGNIFICANT CHANGE UP (ref 27–34)
MCHC RBC-ENTMCNC: 32.2 GM/DL — SIGNIFICANT CHANGE UP (ref 32–36)
MCV RBC AUTO: 89.2 FL — SIGNIFICANT CHANGE UP (ref 80–100)
MONOCYTES # BLD AUTO: 2.3 K/UL — HIGH (ref 0–0.9)
MONOCYTES NFR BLD AUTO: 17.2 % — HIGH (ref 2–14)
NEUTROPHILS # BLD AUTO: 8.9 K/UL — HIGH (ref 1.8–7.4)
NEUTROPHILS NFR BLD AUTO: 65.9 % — SIGNIFICANT CHANGE UP (ref 43–77)
PHOSPHATE SERPL-MCNC: 2.5 MG/DL — SIGNIFICANT CHANGE UP (ref 2.5–4.5)
PLATELET # BLD AUTO: 221 K/UL — SIGNIFICANT CHANGE UP (ref 150–400)
POTASSIUM SERPL-MCNC: 3.3 MMOL/L — LOW (ref 3.5–5.3)
POTASSIUM SERPL-SCNC: 3.3 MMOL/L — LOW (ref 3.5–5.3)
RBC # BLD: 3.18 M/UL — LOW (ref 3.8–5.2)
RBC # FLD: 14 % — SIGNIFICANT CHANGE UP (ref 10.3–14.5)
SODIUM SERPL-SCNC: 139 MMOL/L — SIGNIFICANT CHANGE UP (ref 135–145)
WBC # BLD: 13.5 K/UL — HIGH (ref 3.8–10.5)
WBC # FLD AUTO: 13.5 K/UL — HIGH (ref 3.8–10.5)

## 2018-09-12 PROCEDURE — 74183 MRI ABD W/O CNTR FLWD CNTR: CPT | Mod: 26

## 2018-09-12 RX ORDER — POTASSIUM CHLORIDE 20 MEQ
10 PACKET (EA) ORAL
Qty: 0 | Refills: 0 | Status: COMPLETED | OUTPATIENT
Start: 2018-09-12 | End: 2018-09-12

## 2018-09-12 RX ORDER — POTASSIUM CHLORIDE 20 MEQ
20 PACKET (EA) ORAL
Qty: 0 | Refills: 0 | Status: DISCONTINUED | OUTPATIENT
Start: 2018-09-12 | End: 2018-09-12

## 2018-09-12 RX ADMIN — CEFTRIAXONE 100 GRAM(S): 500 INJECTION, POWDER, FOR SOLUTION INTRAMUSCULAR; INTRAVENOUS at 07:03

## 2018-09-12 RX ADMIN — ZOLPIDEM TARTRATE 5 MILLIGRAM(S): 10 TABLET ORAL at 22:20

## 2018-09-12 RX ADMIN — Medication 100 MILLIGRAM(S): at 07:03

## 2018-09-12 RX ADMIN — Medication 100 MILLIEQUIVALENT(S): at 09:29

## 2018-09-12 RX ADMIN — BUDESONIDE AND FORMOTEROL FUMARATE DIHYDRATE 2 PUFF(S): 160; 4.5 AEROSOL RESPIRATORY (INHALATION) at 22:22

## 2018-09-12 RX ADMIN — ENOXAPARIN SODIUM 40 MILLIGRAM(S): 100 INJECTION SUBCUTANEOUS at 11:18

## 2018-09-12 RX ADMIN — Medication 325 MILLIGRAM(S): at 11:18

## 2018-09-12 RX ADMIN — Medication 100 MILLIEQUIVALENT(S): at 12:15

## 2018-09-12 RX ADMIN — PANTOPRAZOLE SODIUM 40 MILLIGRAM(S): 20 TABLET, DELAYED RELEASE ORAL at 07:03

## 2018-09-12 RX ADMIN — TAMSULOSIN HYDROCHLORIDE 0.4 MILLIGRAM(S): 0.4 CAPSULE ORAL at 22:21

## 2018-09-12 RX ADMIN — Medication 650 MILLIGRAM(S): at 00:00

## 2018-09-12 RX ADMIN — Medication 100 MILLIGRAM(S): at 22:21

## 2018-09-12 RX ADMIN — Medication 100 MILLIGRAM(S): at 13:21

## 2018-09-12 RX ADMIN — Medication 100 MILLIEQUIVALENT(S): at 11:17

## 2018-09-12 RX ADMIN — BUDESONIDE AND FORMOTEROL FUMARATE DIHYDRATE 2 PUFF(S): 160; 4.5 AEROSOL RESPIRATORY (INHALATION) at 13:05

## 2018-09-12 NOTE — PROGRESS NOTE ADULT - SUBJECTIVE AND OBJECTIVE BOX
Patient is a 63y old  Female who presents with a chief complaint of lower abdominal pain for 4 days (12 Sep 2018 11:38)  Awake, alert, comfortable in bed in NAD. Will have EGD because of abdominal pain.    INTERVAL HPI/OVERNIGHT EVENTS:      VITAL SIGNS:  T(F): 98.1 (09-12-18 @ 05:14)  HR: 73 (09-12-18 @ 05:14)  BP: 116/81 (09-12-18 @ 05:14)  RR: 16 (09-12-18 @ 05:14)  SpO2: 99% (09-12-18 @ 05:14)  Wt(kg): --  I&O's Detail    11 Sep 2018 07:01  -  12 Sep 2018 07:00  --------------------------------------------------------  IN:    Oral Fluid: 240 mL  Total IN: 240 mL    OUT:  Total OUT: 0 mL    Total NET: 240 mL              REVIEW OF SYSTEMS:    CONSTITUTIONAL:  No fevers, chills, sweats    HEENT:  Eyes:  No diplopia or blurred vision. ENT:  No earache, sore throat or runny nose.    CARDIOVASCULAR:  No pressure, squeezing, tightness, or heaviness about the chest; no palpitations.    RESPIRATORY:  Per HPI    GASTROINTESTINAL:  No abdominal pain, nausea, vomiting or diarrhea.    GENITOURINARY:  No dysuria, frequency or urgency.    NEUROLOGIC:  No paresthesias, fasciculations, seizures or weakness.    PSYCHIATRIC:  No disorder of thought or mood.      PHYSICAL EXAM:    Constitutional: Well developed and nourished  Eyes:Perrla  ENMT: normal  Neck:supple  Respiratory: Wheezing posteriorly  Cardiovascular: S1 S2 regular  Gastrointestinal: Soft, Non tender  Extremities: No edema  Vascular:normal  Neurological:Awake, alert,Ox3  Musculoskeletal:Normal      MEDICATIONS  (STANDING):  ALBUTerol    90 MICROgram(s) HFA Inhaler 1 Puff(s) Inhalation every 6 hours  buDESOnide  80 MICROgram(s)/formoterol 4.5 MICROgram(s) Inhaler 2 Puff(s) Inhalation two times a day  cefTRIAXone   IVPB 1 Gram(s) IV Intermittent every 24 hours  enoxaparin Injectable 40 milliGRAM(s) SubCutaneous daily  ergocalciferol 88887 Unit(s) Oral <User Schedule>  ferrous    sulfate 325 milliGRAM(s) Oral daily  melatonin 3 milliGRAM(s) Oral at bedtime  metroNIDAZOLE  IVPB 500 milliGRAM(s) IV Intermittent every 8 hours  pantoprazole    Tablet 40 milliGRAM(s) Oral before breakfast  sodium chloride 0.9%. 1000 milliLiter(s) (125 mL/Hr) IV Continuous <Continuous>  tamsulosin 0.4 milliGRAM(s) Oral at bedtime    MEDICATIONS  (PRN):  acetaminophen   Tablet .. 650 milliGRAM(s) Oral every 6 hours PRN Temp greater or equal to 38C (100.4F)  zolpidem 5 milliGRAM(s) Oral at bedtime PRN Insomnia      Allergies    Motrin (Swelling)    Intolerances        LABS:                        9.1    13.5  )-----------( 221      ( 12 Sep 2018 05:44 )             28.4     09-12    139  |  111<H>  |  11  ----------------------------<  113<H>  3.3<L>   |  19<L>  |  0.84    Ca    7.7<L>      12 Sep 2018 05:44  Phos  2.5     09-12  Mg     1.7     09-10                CAPILLARY BLOOD GLUCOSE            RADIOLOGY & ADDITIONAL TESTS:    CXR:    Ct scan chest:    ekg;    echo:

## 2018-09-12 NOTE — PROGRESS NOTE ADULT - PROBLEM SELECTOR PLAN 1
qSOFA 0  presented with back pain, abdominal pain, nausea  reports that she frequently holds urine   CT abdomen/pelvis: colitis and left pyelonephritis with microabscess noted    continue  rocephin and flagyl   Urine culture was negative  f/u blood culture  renal u/s showed multiple cysts in the kidney and small echogenic foci in left kidney.  ID Dr. Ace

## 2018-09-12 NOTE — PROGRESS NOTE ADULT - SUBJECTIVE AND OBJECTIVE BOX
PGY 1 Note discussed with supervising resident and primary attending    Patient is a 63y old  Female who presents with a chief complaint of lower abdominal pain for 4 days (11 Sep 2018 17:53)      INTERVAL HPI/OVERNIGHT EVENTS: Patient complaints of low grade fever and got relieved with Tylenol.    MEDICATIONS  (STANDING):  ALBUTerol    90 MICROgram(s) HFA Inhaler 1 Puff(s) Inhalation every 6 hours  buDESOnide  80 MICROgram(s)/formoterol 4.5 MICROgram(s) Inhaler 2 Puff(s) Inhalation two times a day  cefTRIAXone   IVPB 1 Gram(s) IV Intermittent every 24 hours  enoxaparin Injectable 40 milliGRAM(s) SubCutaneous daily  ergocalciferol 50820 Unit(s) Oral <User Schedule>  ferrous    sulfate 325 milliGRAM(s) Oral daily  melatonin 3 milliGRAM(s) Oral at bedtime  metroNIDAZOLE  IVPB 500 milliGRAM(s) IV Intermittent every 8 hours  pantoprazole    Tablet 40 milliGRAM(s) Oral before breakfast  potassium chloride  10 mEq/100 mL IVPB 10 milliEquivalent(s) IV Intermittent every 1 hour  sodium chloride 0.9%. 1000 milliLiter(s) (125 mL/Hr) IV Continuous <Continuous>  tamsulosin 0.4 milliGRAM(s) Oral at bedtime    MEDICATIONS  (PRN):  acetaminophen   Tablet .. 650 milliGRAM(s) Oral every 6 hours PRN Temp greater or equal to 38C (100.4F)  zolpidem 5 milliGRAM(s) Oral at bedtime PRN Insomnia      __________________________________________________  REVIEW OF SYSTEMS:    CONSTITUTIONAL: No fever,   EYES: no acute visual disturbances  NECK: No pain or stiffness  RESPIRATORY: No cough; No shortness of breath  CARDIOVASCULAR: No chest pain, no palpitations  GASTROINTESTINAL: No pain. No nausea or vomiting; No diarrhea   NEUROLOGICAL: No headache or numbness, no tremors  MUSCULOSKELETAL: No joint pain, no muscle pain  GENITOURINARY: no dysuria, no frequency, no hesitancy  PSYCHIATRY: no depression , no anxiety  ALL OTHER  ROS negative        Vital Signs Last 24 Hrs  T(C): 36.7 (12 Sep 2018 05:14), Max: 38 (11 Sep 2018 21:35)  T(F): 98.1 (12 Sep 2018 05:14), Max: 100.4 (11 Sep 2018 21:35)  HR: 73 (12 Sep 2018 05:14) (73 - 91)  BP: 116/81 (12 Sep 2018 05:14) (116/81 - 144/75)  BP(mean): --  RR: 16 (12 Sep 2018 05:14) (16 - 16)  SpO2: 99% (12 Sep 2018 05:14) (95% - 99%)    ________________________________________________  PHYSICAL EXAM:  GENERAL: NAD  HEENT: Normocephalic;  conjunctivae and sclerae clear; moist mucous membranes;   NECK : supple  CHEST/LUNG: Clear to auscultation bilaterally with good air entry   HEART: S1 S2  regular; no murmurs, gallops or rubs  ABDOMEN: Soft, Nontender, Nondistended; Bowel sounds present  EXTREMITIES: no cyanosis; no edema; no calf tenderness  SKIN: warm and dry; no rash  NERVOUS SYSTEM:  Awake and alert; Oriented  to place, person and time ; no new deficits    _________________________________________________  LABS:                        9.1    13.5  )-----------( 221      ( 12 Sep 2018 05:44 )             28.4     09-12    139  |  111<H>  |  11  ----------------------------<  113<H>  3.3<L>   |  19<L>  |  0.84    Ca    7.7<L>      12 Sep 2018 05:44  Phos  2.2     09-11  Mg     1.7     09-10          CAPILLARY BLOOD GLUCOSE            RADIOLOGY & ADDITIONAL TESTS:    Imaging Personally Reviewed:  YES    Consultant(s) Notes Reviewed:   YES    Care Discussed with Consultants : YES     Plan of care was discussed with patient and /or primary care giver; all questions and concerns were addressed and care was aligned with patient's wishes.

## 2018-09-12 NOTE — PROGRESS NOTE ADULT - PROBLEM SELECTOR PLAN 2
f/u  stool for ova and parasites.  Blood cultures negative 9/10 and urine cultures negative  C.diff negative  f/u Stool for culture.  Potassium is 3.3 and replaced.  Monitor BMP.  f/u stool for CDT.  F/U MRCP  continue with colitis

## 2018-09-12 NOTE — PROGRESS NOTE ADULT - SUBJECTIVE AND OBJECTIVE BOX
Patient is a 63y old  Female who presents with a chief complaint of lower abdominal pain for 4 days (12 Sep 2018 09:06)    pt seen in icu [  ], reg med floor [   ], bed [  ], chair at bedside [   ], a+o x3 [  ], lethargic [  ],  nad [  ]    hendrickson [  ], ngt [  ], peg [  ], et tube [  ], cent line [  ], picc line [  ]        Allergies    Motrin (Swelling)        Vitals    T(F): 98.1 (09-12-18 @ 05:14), Max: 100.4 (09-11-18 @ 21:35)  HR: 73 (09-12-18 @ 05:14) (73 - 91)  BP: 116/81 (09-12-18 @ 05:14) (116/81 - 144/75)  RR: 16 (09-12-18 @ 05:14) (16 - 16)  SpO2: 99% (09-12-18 @ 05:14) (95% - 99%)  Wt(kg): --  CAPILLARY BLOOD GLUCOSE          Labs                          9.1    13.5  )-----------( 221      ( 12 Sep 2018 05:44 )             28.4       09-12    139  |  111<H>  |  11  ----------------------------<  113<H>  3.3<L>   |  19<L>  |  0.84    Ca    7.7<L>      12 Sep 2018 05:44  Phos  2.5     09-12  Mg     1.7     09-10              .Blood Blood-Peripheral  09-10 @ 23:03   No growth to date.  --  --      .Blood Blood-Peripheral  09-10 @ 23:02   No growth to date.  --  --      .Urine Clean Catch (Midstream)  09-09 @ 21:33   <10,000 CFU/ml Normal Urogenital zehra present  --  --          Radiology Results      Meds    MEDICATIONS  (STANDING):  ALBUTerol    90 MICROgram(s) HFA Inhaler 1 Puff(s) Inhalation every 6 hours  buDESOnide  80 MICROgram(s)/formoterol 4.5 MICROgram(s) Inhaler 2 Puff(s) Inhalation two times a day  cefTRIAXone   IVPB 1 Gram(s) IV Intermittent every 24 hours  enoxaparin Injectable 40 milliGRAM(s) SubCutaneous daily  ergocalciferol 23446 Unit(s) Oral <User Schedule>  ferrous    sulfate 325 milliGRAM(s) Oral daily  melatonin 3 milliGRAM(s) Oral at bedtime  metroNIDAZOLE  IVPB 500 milliGRAM(s) IV Intermittent every 8 hours  pantoprazole    Tablet 40 milliGRAM(s) Oral before breakfast  potassium chloride  10 mEq/100 mL IVPB 10 milliEquivalent(s) IV Intermittent every 1 hour  sodium chloride 0.9%. 1000 milliLiter(s) (125 mL/Hr) IV Continuous <Continuous>  tamsulosin 0.4 milliGRAM(s) Oral at bedtime      MEDICATIONS  (PRN):  acetaminophen   Tablet .. 650 milliGRAM(s) Oral every 6 hours PRN Temp greater or equal to 38C (100.4F)  zolpidem 5 milliGRAM(s) Oral at bedtime PRN Insomnia      Physical Exam    Neuro :  no focal deficits  Respiratory: CTA B/L  CV: RRR, S1S2, no murmurs,   Abdominal: Soft, NT, ND +BS,  Extremities: No edema, + peripheral pulses    ASSESSMENT    Noninfectious gastroenteritis  COPD (chronic obstructive pulmonary disease)  S/P thyroid surgery  S/P RANDOLPH-BSO      PLAN Patient is a 63y old  Female who presents with a chief complaint of lower abdominal pain for 4 days (12 Sep 2018 09:06)    pt seen in icu [  ], reg med floor [ x  ], bed [x  ], chair at bedside [   ], a+o x3 [ x ], lethargic [  ],  nad [x ]        Allergies    Motrin (Swelling)        Vitals    T(F): 98.1 (09-12-18 @ 05:14), Max: 100.4 (09-11-18 @ 21:35)  HR: 73 (09-12-18 @ 05:14) (73 - 91)  BP: 116/81 (09-12-18 @ 05:14) (116/81 - 144/75)  RR: 16 (09-12-18 @ 05:14) (16 - 16)  SpO2: 99% (09-12-18 @ 05:14) (95% - 99%)  Wt(kg): --  CAPILLARY BLOOD GLUCOSE          Labs                          9.1    13.5  )-----------( 221      ( 12 Sep 2018 05:44 )             28.4       09-12    139  |  111<H>  |  11  ----------------------------<  113<H>  3.3<L>   |  19<L>  |  0.84    Ca    7.7<L>      12 Sep 2018 05:44  Phos  2.5     09-12  Mg     1.7     09-10          Clostridium difficile Toxin by PCR (09.11.18 @ 17:48)    Clostridium difficile Toxin by PCR: The results of this test should be interpreted with consideration of all  clinical and laboratory findings. This test determines the presence of  the C. difficile tcdB gene at a given time and is not intended to  identify antibiotic associated disease or C. difficile infection without  clinical context. Successful treatment is based on the resolution of  clinical symptoms. This test should not be used as a "test of cure"  because C. difficile DNA will persist after successful treatment. Repeat  testing will not be permitted.    This test is performed on the BD MAX system using Real-Time PCR and  fluorogenic target-specific hybridization.    C Diff by PCR Result: NotDetec        .Blood Blood-Peripheral  09-10 @ 23:03   No growth to date.  --  --      .Blood Blood-Peripheral  09-10 @ 23:02   No growth to date.  --  --      .Urine Clean Catch (Midstream)  09-09 @ 21:33   <10,000 CFU/ml Normal Urogenital zehra present  --  --        < from: US Renal (09.11.18 @ 11:59) >  FINDINGS:  The right kidney measures 11.2 cm in length. .    The left kidney measures 11.3 cm in length. .    There is no hydronephrosis.    Multiple cysts are noted in the left kidney. Nonspecific small echogenic   foci noted within the left kidney    IMPRESSION:  No hydronephrosis.     < end of copied text >    Radiology Results      Meds    MEDICATIONS  (STANDING):  ALBUTerol    90 MICROgram(s) HFA Inhaler 1 Puff(s) Inhalation every 6 hours  buDESOnide  80 MICROgram(s)/formoterol 4.5 MICROgram(s) Inhaler 2 Puff(s) Inhalation two times a day  cefTRIAXone   IVPB 1 Gram(s) IV Intermittent every 24 hours  enoxaparin Injectable 40 milliGRAM(s) SubCutaneous daily  ergocalciferol 13395 Unit(s) Oral <User Schedule>  ferrous    sulfate 325 milliGRAM(s) Oral daily  melatonin 3 milliGRAM(s) Oral at bedtime  metroNIDAZOLE  IVPB 500 milliGRAM(s) IV Intermittent every 8 hours  pantoprazole    Tablet 40 milliGRAM(s) Oral before breakfast  potassium chloride  10 mEq/100 mL IVPB 10 milliEquivalent(s) IV Intermittent every 1 hour  sodium chloride 0.9%. 1000 milliLiter(s) (125 mL/Hr) IV Continuous <Continuous>  tamsulosin 0.4 milliGRAM(s) Oral at bedtime      MEDICATIONS  (PRN):  acetaminophen   Tablet .. 650 milliGRAM(s) Oral every 6 hours PRN Temp greater or equal to 38C (100.4F)  zolpidem 5 milliGRAM(s) Oral at bedtime PRN Insomnia      Physical Exam    Neuro :  no focal deficits  Respiratory: CTA B/L  CV: RRR, S1S2, no murmurs,   Abdominal: Soft, NT, ND +BS,  Extremities: No edema, + peripheral pulses    ASSESSMENT    left pyelonephritis with micro abcesses,   left distal ureteral stone,   colitis,   duodenitis,   anemia,   h/o asthma  COPD (chronic obstructive pulmonary disease)  S/P thyroid surgery  S/P RANDOLHP-BSO      PLAN    cont rocephin and flagyl,   cont flomax,   urology f/u   pt to have cystoscopy with possible stent placement  pt with low risk for periop cardiac complication for the proposed cystoscopy procedure  id f/u   blood cx neg noted above  u cx neg noted above  renal bladder sono neg results noted above There is no hydronephrosis.  Multiple cysts are noted in the left kidney. Nonspecific small echogenic   foci noted within the left kidney  f/u anemia panel  gi f/u noted  f/u EGD  f/u stool studies   c diff negative noted above  pulm f/u noted  cont current meds

## 2018-09-12 NOTE — PROGRESS NOTE ADULT - PROBLEM SELECTOR PLAN 6
CT scan showed 2mm left ureteral stone and microabscesses  may need stent placement  continue with tamsulosin  f/u with urology for stent placement.

## 2018-09-12 NOTE — PROGRESS NOTE ADULT - SUBJECTIVE AND OBJECTIVE BOX
Patient examined at bedside, no complaints. reports less left CVA tenderness   No nausea, no vomiting  Tolerating diet    T(C): 36.7 (09-12-18 @ 05:14), Max: 38 (09-11-18 @ 21:35)  HR: 73 (09-12-18 @ 05:14) (73 - 91)  BP: 116/81 (09-12-18 @ 05:14) (116/81 - 144/75)  RR: 16 (09-12-18 @ 05:14) (16 - 16)  SpO2: 99% (09-12-18 @ 05:14) (95% - 99%)  Wt(kg): --      09-11 @ 07:01  -  09-12 @ 07:00  --------------------------------------------------------  IN: 240 mL / OUT: 0 mL / NET: 240 mL      Physical Exam  General: AAOx3, No acute distress  Skin: No jaundice, no icterus  Abdomen: soft, nontender, nondistended  Back: no cva tenderness on palpation  Extremities: non edematous, no calf pain bilaterally                          9.1    13.5  )-----------( 221      ( 12 Sep 2018 05:44 )             28.4   09-12    139  |  111<H>  |  11  ----------------------------<  113<H>  3.3<L>   |  19<L>  |  0.84    Ca    7.7<L>      12 Sep 2018 05:44  Phos  2.5     09-12  Mg     1.7     09-10

## 2018-09-12 NOTE — PROGRESS NOTE ADULT - SUBJECTIVE AND OBJECTIVE BOX
Meds:  cefTRIAXone   IVPB 1 Gram(s) IV Intermittent every 24 hours  metroNIDAZOLE  IVPB 500 milliGRAM(s) IV Intermittent every 8 hours    Allergies:  Allergies    Motrin (Swelling)    Intolerances      ROS  [  ] UNABLE TO ELICIT    General:  [  ] None  [  ] Fever  [  ] Chills  [ x ] Malaise    Skin:  [ x ] None [  ] Rash  [  ] Wound  [  ] Ulcer    HEENT:  [ x ] None  [  ] Sore Throat  [  ] Nasal congestion/ runny nose  [  ] Photophobia [  ] Neck pain      Chest:  [ x ] None   [  ] SOB  [  ] Cough  [  ] None    Cardiovascular:   [ x ] None  [  ] CP  [  ] Palpitation    Gastrointestinal:  [  ] None  [ x ] Abd pain(resolving)   [  ] Nausea    [  ] Vomiting   [  ] Diarrhea	     Genitourinary:  [ x ] None [  ] Polyuria   [  ] Urgency  [  ] Frequency  [  ] Dysuria    [  ]  Hematuria       Musculoskeletal:  [  ] None [  ] Back Pain	[  ] Body aches  [  ] Joint pain [ x ] Weakness    Neurological: [  ] None [  ]Dizziness  [  ]Visual Disturbance  [  ]Headaches   [ x ] Weakness          PHYSICAL EXAM:    Vital Signs Last 24 Hrs  Vital Signs Last 24 Hrs  T(C): 36.7 (12 Sep 2018 05:14), Max: 38 (11 Sep 2018 21:35)  T(F): 98.1 (12 Sep 2018 05:14), Max: 100.4 (11 Sep 2018 21:35)  HR: 73 (12 Sep 2018 05:14) (73 - 91)  BP: 116/81 (12 Sep 2018 05:14) (116/81 - 144/75)  BP(mean): --  RR: 16 (12 Sep 2018 05:14) (16 - 16)  SpO2: 99% (12 Sep 2018 05:14) (95% - 99%)    Constitutional: feels better.    HEENT: [ x ] Wnl  [  ] Pharyngeal congestion    Neck:  [ x ] Supple  [  ]Lymphadenopathy  [ x ] No JVD   [  ] JVD  [  ] Masses   [  ] WNL    CHEST/Respiratory:  [ x ]Clear to auscultation  [  ] Rales   [  ] Rhonchi   [  ] Wheezing     [  ] Chest Tenderness      Cardiovascular:  [ x ] Reg S1 S2   [  ] Irreg S1 S2   [ x ]No Murmur  [  ] +ve Murmurs  [  ]Systolic [  ]Diastolic      Abdomen:  [ x ] Soft  [  ] No tendrerness  [ x ] Tenderness (mild diffuse) [  ] Organomegaly  [  ] ABD Distention  [  ] Rigidity                       [ x ] No Regidity                       [ x ] No Rebound Tenderness  [ x ] No Guarding Rigidity  [  ] Rebound Tenderness[  ] Guarding Rigidity                          [ x ]  +ve Bowel Sounds  [  ] Decreased Bowel Sounds    [  ] Absent Bowel Sounds                            Extremities: [ x ] No edema [  ] Edema  [  ] Clubbing   [  ] Cyanosis                         [ x ] No Tender Calf muscles  [  ] Tender Calf muscles                        [ x ] Palpable peripheral pulses    Neurological: [ x ] Awake  [ x ] Alert  [ x ] Oriented  x  3                           [  ] Confused  [  ] Drowsy  [  ] respond to painful stimuli  [  ] Unresponsive    Skin:  [ x ] Intact [  ] Redness [  ] Thrombophlebitis  [  ] Rashes  [  ] Dry  [  ] Ulcers    Ortho:  [  ] Joint Swelling  [  ] Joint erythema [  ] Joint tenderness                [  ] Increased temp. to touch  [  ] DJD [ x ] WNL          LABS/DIAGNOSTIC TESTS                        9.1    13.5  )-----------( 221      ( 12 Sep 2018 05:44 )             28.4   09-12    139  |  111<H>  |  11  ----------------------------<  113<H>  3.3<L>   |  19<L>  |  0.84    Ca    7.7<L>      12 Sep 2018 05:44  Phos  2.5     09-12  Mg     1.7     09-10          CULTURES:   Culture - Blood (09.10.18 @ 23:03)    Specimen Source: .Blood Blood-Peripheral    Culture Results:   No growth to date.    Culture - Blood (09.10.18 @ 23:02)    Specimen Source: .Blood Blood-Peripheral    Culture Results:   No growth to date.      Culture - Urine (09.09.18 @ 21:33)    Specimen Source: .Urine Clean Catch (Midstream)    Culture Results:   <10,000 CFU/ml Normal Urogenital zehra present      RADIOLOGY:    EXAM:  US KIDNEY(S)                            PROCEDURE DATE:  09/11/2018          INTERPRETATION:  CLINICAL STATEMENT: [nephritis]    TECHNIQUE: Ultrasound of the kidneys.    COMPARISON: CT abdomen pelvis 9/9/2018    FINDINGS:  The right kidney measures 11.2 cm in length. .    The left kidney measures 11.3 cm in length. .    There is no hydronephrosis.    Multiple cysts are noted in the left kidney. Nonspecific small echogenic   foci noted within the left kidney    IMPRESSION:  No hydronephrosis.           CXR:    EXAM:  XR CHEST AP OR PA 1V                            PROCEDURE DATE:  09/09/2018          INTERPRETATION:  History: Upper abdominal pain    Technique:  AP portable    Comparisons:  none    Findings:     Subsegmental atelectasis and volume loss inleft lower   lobe.  . Right lung is clear. No pleural effusions.    The pulmonary   vasculature and aorta are normal for age. Heart size is unremarkable.     The thorax is normal for age.    Impression: Subsegmental atelectasis left lower lobe. Mild relative   elevation of left diaphragm          EXAM:  CT ABDOMEN AND PELVIS OC IC                            PROCEDURE DATE:  09/09/2018          INTERPRETATION:  Abdominal/Pelvic CT    9/9/2018 10:28 PM    Indication: Abdominal pain and nausea, fever and diarrhea for the past 4   days    Technique: Axial images were obtained following oral and IV contrast from   the lung bases through pubic symphysis.       Reformatted coronal and   sagittal images are submitted.    Comparison: None    FINDINGS:    LUNG BASES:  There are no pleural effusions. Cystic bronchiectasis at the   left lung base  PERITONEUM:  There is no free air or focal collection.  No free fluid.  LIVER: At the dome, there is a small lobulated lesion measuring 1.2 cm   with nodular hyperenhancement probably representing hemangioma.   Subcentimeter lucencies too small to characterize..  SPLEEN: Normal.  GALLBLADDER: Contracted.  BILIARY TREE: Unremarkable.  PANCREAS: Normal.  ADRENAL GLANDS: Normal.  KIDNEYS: The left kidney demonstrates striated areas of hypoenhancement  concerning for pyelonephritis. There is a 2 mm stone in the expected   region of the left distal ureter. There are ill-defined lucencies in the   left upper kidney which probably represent microabscesses. There are   scattered cysts in the left lower kidney subcentimeter lucency in the   right kidney too small to characterize.  BOWEL: The stomach is incompletely distended.  Oral contrast is seen as   distally as rectum. There is marked thickening of the duodenum suggesting   duodenitis/peptic ulcer disease. There is no small bowel obstruction or   diverticulitis. The appendix is unremarkable. There is mild thickening of   the transverse and left colon suggesting colitis. Scattered   diverticulosis.    URINARY BLADDER: Collapsed.  PELVIC ORGANS: No pelvic masses.    There is no significant adenopathy. There is diffuse mesenteric edema.  VASCULATURE: The aorta is not dilated. There is mild atherosclerotic   vascular calcification.  RETROPERITONEUM:  There is no mass.  BONES: Unremarkable.  ABDOMINAL WALL: Unremarkable.    IMPRESSION:    Left-sided pyelonephritis with small microabscesses. Mild left renal   pelvic fullness without hydroureter. A 2 mm stone in the course of the   left distal ureter may represent an obstructing stone as opposed to   phlebolith; difficult to assess without ureteral distention or previous   study for comparison. Duodenitis.  Mild colitis.    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 She had lower abdominal pain on thursday morning which she initially thought that is from holding urine for long time or form constipation but it persisted through out day so she went to PCP next day who gave her Cipro and fleet enema for constipation.  Ct abdomen suggested left pyelonephritis and mild colitis.  Patient was started on IV Rocephin and Flagyl.  9/12/18 Patient feels better and is scheduled for cystoscopy.    Problem/Recommendation - 1:  Problem: Pyelonephritis.   Recommendation:   1- UA & CS suggest contamination.  2- Blood culture.  3- Renal and bladder sonogram result is noted.  4- Continue Rocephin 1 gm IVPB q day.  5- IV hydration.  6- CBC and BMP follow up.  7- Continue IV Flagyl for colitis.  8- Urology management and follow up.    Problem/Recommendation - 2:  ·  Problem: Colitis.    Recommendation:   1- IV hydration.  2- stool for ova and parasites.  3- Stool for culture.  4- stool for CDT.  5- CBC & BMP follow up.   6- ABX as per problem #1.     Problem/Recommendation - 3:  ·  Problem: COPD (chronic obstructive pulmonary disease).    Recommendation:   1- Bronchodilators.  2- O2 as needed.  3- Pulmonary evaluation and management.  4- Steroids as per primary, and pulmonary team if needed.     Problem/Recommendation - 4:  ·  Problem: Anemia.    Recommendation:   1- Anemia profile.  2- Iron supplements.  3- Closely monitor H & H.  4- Observe for bleeding.     Discussed with medical resident and patient.

## 2018-09-12 NOTE — PROGRESS NOTE ADULT - PROBLEM SELECTOR PLAN 5
Has left ureteral stone and microabscesses  may need stent placement  Urology follow up  Renal US as per

## 2018-09-13 LAB
ANION GAP SERPL CALC-SCNC: 11 MMOL/L — SIGNIFICANT CHANGE UP (ref 5–17)
BASOPHILS # BLD AUTO: 0.1 K/UL — SIGNIFICANT CHANGE UP (ref 0–0.2)
BASOPHILS NFR BLD AUTO: 1.1 % — SIGNIFICANT CHANGE UP (ref 0–2)
BUN SERPL-MCNC: 9 MG/DL — SIGNIFICANT CHANGE UP (ref 7–18)
CALCIUM SERPL-MCNC: 7.6 MG/DL — LOW (ref 8.4–10.5)
CHLORIDE SERPL-SCNC: 108 MMOL/L — SIGNIFICANT CHANGE UP (ref 96–108)
CO2 SERPL-SCNC: 19 MMOL/L — LOW (ref 22–31)
CREAT SERPL-MCNC: 0.74 MG/DL — SIGNIFICANT CHANGE UP (ref 0.5–1.3)
CULTURE RESULTS: SIGNIFICANT CHANGE UP
EOSINOPHIL # BLD AUTO: 0.2 K/UL — SIGNIFICANT CHANGE UP (ref 0–0.5)
EOSINOPHIL NFR BLD AUTO: 1.4 % — SIGNIFICANT CHANGE UP (ref 0–6)
GLUCOSE SERPL-MCNC: 88 MG/DL — SIGNIFICANT CHANGE UP (ref 70–99)
HCT VFR BLD CALC: 27.3 % — LOW (ref 34.5–45)
HGB BLD-MCNC: 8.8 G/DL — LOW (ref 11.5–15.5)
LYMPHOCYTES # BLD AUTO: 1.8 K/UL — SIGNIFICANT CHANGE UP (ref 1–3.3)
LYMPHOCYTES # BLD AUTO: 14.9 % — SIGNIFICANT CHANGE UP (ref 13–44)
MAGNESIUM SERPL-MCNC: 1.6 MG/DL — SIGNIFICANT CHANGE UP (ref 1.6–2.6)
MCHC RBC-ENTMCNC: 28.9 PG — SIGNIFICANT CHANGE UP (ref 27–34)
MCHC RBC-ENTMCNC: 32.3 GM/DL — SIGNIFICANT CHANGE UP (ref 32–36)
MCV RBC AUTO: 89.5 FL — SIGNIFICANT CHANGE UP (ref 80–100)
MONOCYTES # BLD AUTO: 1.9 K/UL — HIGH (ref 0–0.9)
MONOCYTES NFR BLD AUTO: 15.5 % — HIGH (ref 2–14)
NEUTROPHILS # BLD AUTO: 8.2 K/UL — HIGH (ref 1.8–7.4)
NEUTROPHILS NFR BLD AUTO: 67.2 % — SIGNIFICANT CHANGE UP (ref 43–77)
PHOSPHATE SERPL-MCNC: 2.4 MG/DL — LOW (ref 2.5–4.5)
PLATELET # BLD AUTO: 290 K/UL — SIGNIFICANT CHANGE UP (ref 150–400)
POTASSIUM SERPL-MCNC: 3.2 MMOL/L — LOW (ref 3.5–5.3)
POTASSIUM SERPL-SCNC: 3.2 MMOL/L — LOW (ref 3.5–5.3)
RBC # BLD: 3.05 M/UL — LOW (ref 3.8–5.2)
RBC # FLD: 14.2 % — SIGNIFICANT CHANGE UP (ref 10.3–14.5)
SODIUM SERPL-SCNC: 138 MMOL/L — SIGNIFICANT CHANGE UP (ref 135–145)
SPECIMEN SOURCE: SIGNIFICANT CHANGE UP
WBC # BLD: 12.3 K/UL — HIGH (ref 3.8–10.5)
WBC # FLD AUTO: 12.3 K/UL — HIGH (ref 3.8–10.5)

## 2018-09-13 RX ORDER — POTASSIUM CHLORIDE 20 MEQ
40 PACKET (EA) ORAL EVERY 4 HOURS
Qty: 0 | Refills: 0 | Status: COMPLETED | OUTPATIENT
Start: 2018-09-13 | End: 2018-09-13

## 2018-09-13 RX ORDER — DEXTROSE MONOHYDRATE, SODIUM CHLORIDE, AND POTASSIUM CHLORIDE 50; .745; 4.5 G/1000ML; G/1000ML; G/1000ML
1000 INJECTION, SOLUTION INTRAVENOUS
Qty: 0 | Refills: 0 | Status: DISCONTINUED | OUTPATIENT
Start: 2018-09-13 | End: 2018-09-17

## 2018-09-13 RX ADMIN — BUDESONIDE AND FORMOTEROL FUMARATE DIHYDRATE 2 PUFF(S): 160; 4.5 AEROSOL RESPIRATORY (INHALATION) at 22:50

## 2018-09-13 RX ADMIN — DEXTROSE MONOHYDRATE, SODIUM CHLORIDE, AND POTASSIUM CHLORIDE 70 MILLILITER(S): 50; .745; 4.5 INJECTION, SOLUTION INTRAVENOUS at 18:27

## 2018-09-13 RX ADMIN — ZOLPIDEM TARTRATE 5 MILLIGRAM(S): 10 TABLET ORAL at 22:51

## 2018-09-13 RX ADMIN — Medication 40 MILLIEQUIVALENT(S): at 14:22

## 2018-09-13 RX ADMIN — BUDESONIDE AND FORMOTEROL FUMARATE DIHYDRATE 2 PUFF(S): 160; 4.5 AEROSOL RESPIRATORY (INHALATION) at 14:23

## 2018-09-13 RX ADMIN — PANTOPRAZOLE SODIUM 40 MILLIGRAM(S): 20 TABLET, DELAYED RELEASE ORAL at 05:42

## 2018-09-13 RX ADMIN — CEFTRIAXONE 100 GRAM(S): 500 INJECTION, POWDER, FOR SOLUTION INTRAMUSCULAR; INTRAVENOUS at 05:42

## 2018-09-13 RX ADMIN — Medication 40 MILLIEQUIVALENT(S): at 09:11

## 2018-09-13 RX ADMIN — Medication 325 MILLIGRAM(S): at 14:22

## 2018-09-13 RX ADMIN — ENOXAPARIN SODIUM 40 MILLIGRAM(S): 100 INJECTION SUBCUTANEOUS at 14:22

## 2018-09-13 RX ADMIN — Medication 100 MILLIGRAM(S): at 23:35

## 2018-09-13 RX ADMIN — TAMSULOSIN HYDROCHLORIDE 0.4 MILLIGRAM(S): 0.4 CAPSULE ORAL at 21:09

## 2018-09-13 RX ADMIN — Medication 100 MILLIGRAM(S): at 14:22

## 2018-09-13 RX ADMIN — Medication 100 MILLIGRAM(S): at 05:42

## 2018-09-13 NOTE — DISCHARGE NOTE ADULT - PLAN OF CARE
Resolution of symptoms You came here with abdominal pain, fever, leucocytosis.  Blood work was done and blood, urine, stool cultures and C.diff were negative. CT Scan of abdomen showed 2mm stone in the Left ureter and microabscesses in the kidney. MRCP showed striated hypo enhancement in upper left kidney suggestive of microabscess. Renal bladder showed no hydronephrosis, multiple cysts in left kidney. Non specific small echogenic foci noted within the left kidney.. You were treated with antibiotics. You were started on Flomax for ureteral stone and outpatient follow up with Dr Jackson. Follow up with your Primary medical doctor and continue Ceftin for 6 more days. You were diagnosed to have duodenitis on ct abdomen. EGD was done and showed -------------.  Follow up with your Primary medical doctor You were diagnosed with COPD. Follow up with your  Pulmonologist and continue the same medication regimen. You were diagnosed with colitis. Please continue the flagyl for 6 more days. Follow up with Gastroenterologist for colonoscopy in 2 weeks.

## 2018-09-13 NOTE — DISCHARGE NOTE ADULT - MEDICATION SUMMARY - MEDICATIONS TO TAKE
I will START or STAY ON the medications listed below when I get home from the hospital:    cefitin  -- 500 milligram(s) by mouth 2 times a day   -- Indication: For Pyelonephritis    Flagyl 500 mg oral tablet  -- 500 milligram(s) by mouth 3 times a day   -- Do not drink alcoholic beverages when taking this medication.  Finish all this medication unless otherwise directed by prescriber.  May discolor urine or feces.    -- Indication: For Pyelonephritis    tamsulosin 0.4 mg oral capsule  -- 1 cap(s) by mouth once a day (at bedtime)  -- Indication: For ureteral stone    albuterol 90 mcg/inh inhalation aerosol  -- 1 puff(s) inhaled every 6 hours  -- Indication: For Asthma    ipratropium-albuterol 0.5 mg-2.5 mg/3 mLinhalation solution  -- 3 milliliter(s) inhaled 4 times a day, As Needed  -- Indication: For Asthma    Symbicort 80 mcg-4.5 mcg/inh inhalation aerosol  -- 2 puff(s) inhaled 2 times a day  -- Indication: For Asthma    ferrous sulfate 325 mg (65 mg elemental iron) oral tablet  -- 1 tab(s) by mouth 3 times a day  -- Indication: For Anemia    pantoprazole 40 mg oral delayed release tablet  -- 1 tab(s) by mouth once a day (before a meal)  -- Indication: For heartburn    ergocalciferol 50,000 intl units (1.25 mg) oral capsule  -- 1 cap(s) by mouth once a week  -- Indication: For Suppliment I will START or STAY ON the medications listed below when I get home from the hospital:    Flagyl 500 mg oral tablet  -- 500 milligram(s) by mouth 3 times a day   -- Do not drink alcoholic beverages when taking this medication.  Finish all this medication unless otherwise directed by prescriber.  May discolor urine or feces.    -- Indication: For Collitis    tamsulosin 0.4 mg oral capsule  -- 1 cap(s) by mouth once a day (at bedtime)  -- Indication: For Kidney stone    Symbicort 80 mcg-4.5 mcg/inh inhalation aerosol  -- 2 puff(s) inhaled 2 times a day  -- Indication: For Asthma    ipratropium-albuterol 0.5 mg-2.5 mg/3 mLinhalation solution  -- 3 milliliter(s) inhaled 4 times a day, As Needed  -- Indication: For Asthma    cefuroxime 500 mg oral tablet  -- 1 tab(s) by mouth 2 times a day   -- Finish all this medication unless otherwise directed by prescriber.  Medication should be taken with plenty of water.  Take with food or milk.    -- Indication: For Pyelonephritis    ferrous sulfate 325 mg (65 mg elemental iron) oral tablet  -- 1 tab(s) by mouth 3 times a day  -- Indication: For Anemia    pantoprazole 40 mg oral delayed release tablet  -- 1 tab(s) by mouth once a day (before a meal)  -- Indication: For heartburn    ergocalciferol 50,000 intl units (1.25 mg) oral capsule  -- 1 cap(s) by mouth once a week  -- Indication: For Suppliment

## 2018-09-13 NOTE — PROGRESS NOTE ADULT - SUBJECTIVE AND OBJECTIVE BOX
Patient is a 63y old  Female who presents with a chief complaint of lower abdominal pain for 4 days (13 Sep 2018 10:29)    Awake, alert, comfortable in bed in NAD. Will have EGD because of abdominal pain.    INTERVAL HPI/OVERNIGHT EVENTS:      VITAL SIGNS:  T(F): 97.9 (09-13-18 @ 05:11)  HR: 74 (09-13-18 @ 05:11)  BP: 128/65 (09-13-18 @ 05:11)  RR: 16 (09-13-18 @ 05:11)  SpO2: 99% (09-13-18 @ 05:11)  Wt(kg): --  I&O's Detail    12 Sep 2018 07:01  -  13 Sep 2018 07:00  --------------------------------------------------------  IN:    Oral Fluid: 240 mL  Total IN: 240 mL    OUT:  Total OUT: 0 mL    Total NET: 240 mL              REVIEW OF SYSTEMS:    CONSTITUTIONAL:  No fevers, chills, sweats    HEENT:  Eyes:  No diplopia or blurred vision. ENT:  No earache, sore throat or runny nose.    CARDIOVASCULAR:  No pressure, squeezing, tightness, or heaviness about the chest; no palpitations.    RESPIRATORY:  Per HPI    GASTROINTESTINAL:  + abdominal pain, no nausea, vomiting or diarrhea.    GENITOURINARY:  No dysuria, frequency or urgency.    NEUROLOGIC:  No paresthesias, fasciculations, seizures or weakness.    PSYCHIATRIC:  No disorder of thought or mood.      PHYSICAL EXAM:    Constitutional: Well developed and nourished  Eyes:Perrla  ENMT: normal  Neck:supple  Respiratory: + Wheezing posteriorly  Cardiovascular: S1 S2 regular  Gastrointestinal: Soft, Non tender  Extremities: No edema  Vascular:normal  Neurological:Awake, alert,Ox3  Musculoskeletal:Normal      MEDICATIONS  (STANDING):  ALBUTerol    90 MICROgram(s) HFA Inhaler 1 Puff(s) Inhalation every 6 hours  buDESOnide  80 MICROgram(s)/formoterol 4.5 MICROgram(s) Inhaler 2 Puff(s) Inhalation two times a day  cefTRIAXone   IVPB 1 Gram(s) IV Intermittent every 24 hours  enoxaparin Injectable 40 milliGRAM(s) SubCutaneous daily  ergocalciferol 00038 Unit(s) Oral <User Schedule>  ferrous    sulfate 325 milliGRAM(s) Oral daily  melatonin 3 milliGRAM(s) Oral at bedtime  metroNIDAZOLE  IVPB 500 milliGRAM(s) IV Intermittent every 8 hours  pantoprazole    Tablet 40 milliGRAM(s) Oral before breakfast  potassium chloride    Tablet ER 40 milliEquivalent(s) Oral every 4 hours  tamsulosin 0.4 milliGRAM(s) Oral at bedtime    MEDICATIONS  (PRN):  acetaminophen   Tablet .. 650 milliGRAM(s) Oral every 6 hours PRN Temp greater or equal to 38C (100.4F)  zolpidem 5 milliGRAM(s) Oral at bedtime PRN Insomnia      Allergies    Motrin (Swelling)    Intolerances        LABS:                        8.8    12.3  )-----------( 290      ( 13 Sep 2018 06:56 )             27.3     09-13    138  |  108  |  9   ----------------------------<  88  3.2<L>   |  19<L>  |  0.74    Ca    7.6<L>      13 Sep 2018 06:56  Phos  2.4     09-13  Mg     1.6     09-13                CAPILLARY BLOOD GLUCOSE            RADIOLOGY & ADDITIONAL TESTS:    CXR:  < from: Xray Chest 1 View AP/PA (09.09.18 @ 20:00) >    INTERPRETATION:  History: Upper abdominal pain    Technique:  AP portable    Comparisons:  none    Findings:     Subsegmental atelectasis and volume loss inleft lower   lobe.  . Right lung is clear. No pleural effusions.    The pulmonary   vasculature and aorta are normal for age. Heart size is unremarkable.     The thorax is normal for age.    Impression: Subsegmental atelectasis left lower lobe. Mild relative   elevation of left diaphragm      < end of copied text >    Ct scan chest:  < from: CT Abdomen and Pelvis w/ Oral Cont and w/ IV Cont (09.09.18 @ 22:18) >  FINDINGS:    LUNG BASES:  There are no pleural effusions. Cystic bronchiectasis at the   left lung base    < end of copied text >    ekg;    echo: Patient is a 63y old  Female who presents with a chief complaint of lower abdominal pain for 4 days (13 Sep 2018 10:29)    Awake, alert, comfortable in bed in NAD. Had EGD because of abdominal pain. Results fo    INTERVAL HPI/OVERNIGHT EVENTS:      VITAL SIGNS:  T(F): 97.9 (09-13-18 @ 05:11)  HR: 74 (09-13-18 @ 05:11)  BP: 128/65 (09-13-18 @ 05:11)  RR: 16 (09-13-18 @ 05:11)  SpO2: 99% (09-13-18 @ 05:11)  Wt(kg): --  I&O's Detail    12 Sep 2018 07:01  -  13 Sep 2018 07:00  --------------------------------------------------------  IN:    Oral Fluid: 240 mL  Total IN: 240 mL    OUT:  Total OUT: 0 mL    Total NET: 240 mL              REVIEW OF SYSTEMS:    CONSTITUTIONAL:  No fevers, chills, sweats    HEENT:  Eyes:  No diplopia or blurred vision. ENT:  No earache, sore throat or runny nose.    CARDIOVASCULAR:  No pressure, squeezing, tightness, or heaviness about the chest; no palpitations.    RESPIRATORY:  Per HPI    GASTROINTESTINAL:  + abdominal pain, no nausea, vomiting or diarrhea.    GENITOURINARY:  No dysuria, frequency or urgency.    NEUROLOGIC:  No paresthesias, fasciculations, seizures or weakness.    PSYCHIATRIC:  No disorder of thought or mood.      PHYSICAL EXAM:    Constitutional: Well developed and nourished  Eyes:Perrla  ENMT: normal  Neck:supple  Respiratory: + Wheezing posteriorly  Cardiovascular: S1 S2 regular  Gastrointestinal: Soft, Non tender  Extremities: No edema  Vascular:normal  Neurological:Awake, alert,Ox3  Musculoskeletal:Normal      MEDICATIONS  (STANDING):  ALBUTerol    90 MICROgram(s) HFA Inhaler 1 Puff(s) Inhalation every 6 hours  buDESOnide  80 MICROgram(s)/formoterol 4.5 MICROgram(s) Inhaler 2 Puff(s) Inhalation two times a day  cefTRIAXone   IVPB 1 Gram(s) IV Intermittent every 24 hours  enoxaparin Injectable 40 milliGRAM(s) SubCutaneous daily  ergocalciferol 68649 Unit(s) Oral <User Schedule>  ferrous    sulfate 325 milliGRAM(s) Oral daily  melatonin 3 milliGRAM(s) Oral at bedtime  metroNIDAZOLE  IVPB 500 milliGRAM(s) IV Intermittent every 8 hours  pantoprazole    Tablet 40 milliGRAM(s) Oral before breakfast  potassium chloride    Tablet ER 40 milliEquivalent(s) Oral every 4 hours  tamsulosin 0.4 milliGRAM(s) Oral at bedtime    MEDICATIONS  (PRN):  acetaminophen   Tablet .. 650 milliGRAM(s) Oral every 6 hours PRN Temp greater or equal to 38C (100.4F)  zolpidem 5 milliGRAM(s) Oral at bedtime PRN Insomnia      Allergies    Motrin (Swelling)    Intolerances        LABS:                        8.8    12.3  )-----------( 290      ( 13 Sep 2018 06:56 )             27.3     09-13    138  |  108  |  9   ----------------------------<  88  3.2<L>   |  19<L>  |  0.74    Ca    7.6<L>      13 Sep 2018 06:56  Phos  2.4     09-13  Mg     1.6     09-13                CAPILLARY BLOOD GLUCOSE            RADIOLOGY & ADDITIONAL TESTS:    CXR:  < from: Xray Chest 1 View AP/PA (09.09.18 @ 20:00) >    INTERPRETATION:  History: Upper abdominal pain    Technique:  AP portable    Comparisons:  none    Findings:     Subsegmental atelectasis and volume loss inleft lower   lobe.  . Right lung is clear. No pleural effusions.    The pulmonary   vasculature and aorta are normal for age. Heart size is unremarkable.     The thorax is normal for age.    Impression: Subsegmental atelectasis left lower lobe. Mild relative   elevation of left diaphragm      < end of copied text >    Ct scan chest:  < from: CT Abdomen and Pelvis w/ Oral Cont and w/ IV Cont (09.09.18 @ 22:18) >  FINDINGS:    LUNG BASES:  There are no pleural effusions. Cystic bronchiectasis at the   left lung base    < end of copied text >  < from: MR MRCP w/wo IV Cont (09.12.18 @ 19:33) >  Impression: The common bile duct measures up to 6 mm in caliber which is   within normal limits. No suspicious filling defect in the common bile   duct to suggest choledocholithiasis. The main pancreatic duct is not   dilated.     In the upper left kidney, there is a small area of striated   hypoenhancement, suggestive of pyelonephritis. Small nonenhancing foci   within this area of striated hypoenhancement in the upper left kidney   measuring up to 8 mm, suggestive of associated microabscesses. Follow-up   abdominal MR in 4-6 weeks may be pursued to ensure resolution or   improvement, and to rule out malignant neoplastic process.    < end of copied text >    ekg;    echo:

## 2018-09-13 NOTE — PROGRESS NOTE ADULT - PROBLEM SELECTOR PLAN 2
stool cultures negative and few yeast seen  Blood cultures negative 9/10 and urine cultures negative  C.diff negative  f/u Stool for culture.  Potassium is 3.3 and replaced.  Monitor BMP.  f/u stool for CDT.  F/U MRCP  continue with colitis stool cultures negative and few yeast seen  Blood cultures negative 9/10 and urine cultures negative  C.diff negative  f/u Stool for culture.  Potassium is 3.3 and replaced.  Monitor BMP.  f/u stool for CDT.  F/U MRCP  F/U EGD  continue with Cipro and flagyl for colitis stool cultures negative and few yeast seen  Potassium is 2.2 and replaced.  Monitor BMP.  f/u stool for CDT.  F/U MRCP  F/U EGD  continue with Cipro and flagyl for colitis stool cultures negative and few yeast seen  Potassium is 2.2 and replaced.  Monitor BMP.  f/u EGD tomorrow  f/u stool for CDT.  F/U MRCP  F/U EGD  continue with Cipro and flagyl for colitis

## 2018-09-13 NOTE — DISCHARGE NOTE ADULT - PATIENT PORTAL LINK FT
You can access the [facility], offered by [facility], by registering with the following website: [email or web address]

## 2018-09-13 NOTE — DIETITIAN INITIAL EVALUATION ADULT. - FACTORS AFF FOOD INTAKE
Voodoo/ethnic/cultural/personal food preferences/persistent diarrhea/diarrhea x 5d; abdominal pain x 4d

## 2018-09-13 NOTE — PROGRESS NOTE ADULT - SUBJECTIVE AND OBJECTIVE BOX
Patient is a 63y old  Female who presents with a chief complaint of lower abdominal pain for 4 days (13 Sep 2018 07:56)    pt seen in icu [  ], reg med floor [ x  ], bed [x  ], chair at bedside [   ], a+o x3 [ x ], lethargic [  ],  nad [x ]        Allergies    Motrin (Swelling)        Vitals    T(F): 97.9 (09-13-18 @ 05:11), Max: 98.8 (09-12-18 @ 21:55)  HR: 74 (09-13-18 @ 05:11) (74 - 79)  BP: 128/65 (09-13-18 @ 05:11) (128/65 - 139/65)  RR: 16 (09-13-18 @ 05:11) (16 - 16)  SpO2: 99% (09-13-18 @ 05:11) (99% - 100%)  Wt(kg): --  CAPILLARY BLOOD GLUCOSE          Labs                          8.8    12.3  )-----------( 290      ( 13 Sep 2018 06:56 )             27.3       09-13    138  |  108  |  9   ----------------------------<  88  3.2<L>   |  19<L>  |  0.74    Ca    7.6<L>      13 Sep 2018 06:56  Phos  2.4     09-13  Mg     1.6     09-13              .Stool Feces  09-12 @ 17:10   Testing in progress  --  --      .Stool Feces  09-11 @ 18:39   Few Yeast like cells  No enteric pathogens to date: Final culture pending  No enteric gram negative rods isolated  --  --      .Blood Blood-Peripheral  09-10 @ 23:03   No growth to date.  --  --      .Blood Blood-Peripheral  09-10 @ 23:02   No growth to date.  --  --      .Urine Clean Catch (Midstream)  09-09 @ 21:33   <10,000 CFU/ml Normal Urogenital zehra present  --  --          Radiology Results      Meds    MEDICATIONS  (STANDING):  ALBUTerol    90 MICROgram(s) HFA Inhaler 1 Puff(s) Inhalation every 6 hours  buDESOnide  80 MICROgram(s)/formoterol 4.5 MICROgram(s) Inhaler 2 Puff(s) Inhalation two times a day  cefTRIAXone   IVPB 1 Gram(s) IV Intermittent every 24 hours  enoxaparin Injectable 40 milliGRAM(s) SubCutaneous daily  ergocalciferol 61415 Unit(s) Oral <User Schedule>  ferrous    sulfate 325 milliGRAM(s) Oral daily  melatonin 3 milliGRAM(s) Oral at bedtime  metroNIDAZOLE  IVPB 500 milliGRAM(s) IV Intermittent every 8 hours  pantoprazole    Tablet 40 milliGRAM(s) Oral before breakfast  potassium chloride    Tablet ER 40 milliEquivalent(s) Oral every 4 hours  tamsulosin 0.4 milliGRAM(s) Oral at bedtime      MEDICATIONS  (PRN):  acetaminophen   Tablet .. 650 milliGRAM(s) Oral every 6 hours PRN Temp greater or equal to 38C (100.4F)  zolpidem 5 milliGRAM(s) Oral at bedtime PRN Insomnia      Physical Exam    Neuro :  no focal deficits  Respiratory: CTA B/L  CV: RRR, S1S2, no murmurs,   Abdominal: Soft, NT, ND +BS,  Extremities: No edema, + peripheral pulses    ASSESSMENT    left pyelonephritis with micro abcesses,   left distal ureteral stone,   colitis,   duodenitis,   anemia,   h/o asthma  COPD (chronic obstructive pulmonary disease)  S/P thyroid surgery  S/P RANDOLPH-BSO      PLAN     cont flomax,   urology f/u noted  s/p cystoscopy with possible stent placement  f/u cultures  renal US  pt with low risk for periop cardiac complication for the proposed cystoscopy procedure  id f/u noted  cont Rocephin and flagyl,  blood cx neg noted   u cx neg noted   renal bladder sono neg results noted. There is no hydronephrosis.  Multiple cysts are noted in the left kidney. Nonspecific small echogenic   foci noted within the left kidney  f/u anemia panel  gi f/u   f/u EGD  f/u stool studies   c diff negative noted   pulm f/u noted  cont current meds Patient is a 63y old  Female who presents with a chief complaint of lower abdominal pain for 4 days (13 Sep 2018 07:56)    pt seen in icu [  ], reg med floor [ x  ], bed [x  ], chair at bedside [   ], a+o x3 [ x ], lethargic [  ],  nad [x ]        Allergies    Motrin (Swelling)        Vitals    T(F): 97.9 (09-13-18 @ 05:11), Max: 98.8 (09-12-18 @ 21:55)  HR: 74 (09-13-18 @ 05:11) (74 - 79)  BP: 128/65 (09-13-18 @ 05:11) (128/65 - 139/65)  RR: 16 (09-13-18 @ 05:11) (16 - 16)  SpO2: 99% (09-13-18 @ 05:11) (99% - 100%)  Wt(kg): --  CAPILLARY BLOOD GLUCOSE          Labs                          8.8    12.3  )-----------( 290      ( 13 Sep 2018 06:56 )             27.3       09-13    138  |  108  |  9   ----------------------------<  88  3.2<L>   |  19<L>  |  0.74    Ca    7.6<L>      13 Sep 2018 06:56  Phos  2.4     09-13  Mg     1.6     09-13              .Stool Feces  09-12 @ 17:10   Testing in progress  --  --      .Stool Feces  09-11 @ 18:39   Few Yeast like cells  No enteric pathogens to date: Final culture pending  No enteric gram negative rods isolated  --  --      .Blood Blood-Peripheral  09-10 @ 23:03   No growth to date.  --  --      .Blood Blood-Peripheral  09-10 @ 23:02   No growth to date.  --  --      .Urine Clean Catch (Midstream)  09-09 @ 21:33   <10,000 CFU/ml Normal Urogenital zehra present  --  --          Radiology Results      Meds    MEDICATIONS  (STANDING):  ALBUTerol    90 MICROgram(s) HFA Inhaler 1 Puff(s) Inhalation every 6 hours  buDESOnide  80 MICROgram(s)/formoterol 4.5 MICROgram(s) Inhaler 2 Puff(s) Inhalation two times a day  cefTRIAXone   IVPB 1 Gram(s) IV Intermittent every 24 hours  enoxaparin Injectable 40 milliGRAM(s) SubCutaneous daily  ergocalciferol 79129 Unit(s) Oral <User Schedule>  ferrous    sulfate 325 milliGRAM(s) Oral daily  melatonin 3 milliGRAM(s) Oral at bedtime  metroNIDAZOLE  IVPB 500 milliGRAM(s) IV Intermittent every 8 hours  pantoprazole    Tablet 40 milliGRAM(s) Oral before breakfast  potassium chloride    Tablet ER 40 milliEquivalent(s) Oral every 4 hours  tamsulosin 0.4 milliGRAM(s) Oral at bedtime      MEDICATIONS  (PRN):  acetaminophen   Tablet .. 650 milliGRAM(s) Oral every 6 hours PRN Temp greater or equal to 38C (100.4F)  zolpidem 5 milliGRAM(s) Oral at bedtime PRN Insomnia      Physical Exam    Neuro :  no focal deficits  Respiratory: CTA B/L  CV: RRR, S1S2, no murmurs,   Abdominal: Soft, NT, ND +BS,  Extremities: No edema, + peripheral pulses    ASSESSMENT    left pyelonephritis with micro abcesses,   left distal ureteral stone,   colitis,   duodenitis,   anemia,   h/o asthma  COPD (chronic obstructive pulmonary disease)  S/P thyroid surgery  S/P RANDOLPH-BSO      PLAN     cont flomax,   urology f/u noted  s/p MRCP  f/u cultures  renal US  pt with low risk for periop cardiac complication for the proposed cystoscopy procedure  id f/u noted  cont Rocephin and flagyl,  blood cx neg noted   u cx neg noted   renal bladder sono neg results noted. There is no hydronephrosis.  Multiple cysts are noted in the left kidney. Nonspecific small echogenic   foci noted within the left kidney  f/u anemia panel  gi f/u   f/u EGD  f/u stool studies   c diff negative noted   pulm f/u noted  cont current meds Patient is a 63y old  Female who presents with a chief complaint of lower abdominal pain for 4 days (13 Sep 2018 07:56)    pt seen in icu [  ], reg med floor [ x  ], bed [x  ], chair at bedside [   ], a+o x3 [ x ], lethargic [  ],  nad [x ]        Allergies    Motrin (Swelling)        Vitals    T(F): 97.9 (09-13-18 @ 05:11), Max: 98.8 (09-12-18 @ 21:55)  HR: 74 (09-13-18 @ 05:11) (74 - 79)  BP: 128/65 (09-13-18 @ 05:11) (128/65 - 139/65)  RR: 16 (09-13-18 @ 05:11) (16 - 16)  SpO2: 99% (09-13-18 @ 05:11) (99% - 100%)  Wt(kg): --  CAPILLARY BLOOD GLUCOSE          Labs                          8.8    12.3  )-----------( 290      ( 13 Sep 2018 06:56 )             27.3       09-13    138  |  108  |  9   ----------------------------<  88  3.2<L>   |  19<L>  |  0.74    Ca    7.6<L>      13 Sep 2018 06:56  Phos  2.4     09-13  Mg     1.6     09-13              .Stool Feces  09-12 @ 17:10   Testing in progress  --  --      .Stool Feces  09-11 @ 18:39   Few Yeast like cells  No enteric pathogens to date: Final culture pending  No enteric gram negative rods isolated  --  --      .Blood Blood-Peripheral  09-10 @ 23:03   No growth to date.  --  --      .Blood Blood-Peripheral  09-10 @ 23:02   No growth to date.  --  --      .Urine Clean Catch (Midstream)  09-09 @ 21:33   <10,000 CFU/ml Normal Urogenital zehra present  --  --        < from: MR MRCP w/wo IV Cont (09.12.18 @ 19:33) >  Impression: The common bile duct measures up to 6 mm in caliber which is   within normal limits. No suspicious filling defect in the common bile   duct to suggest choledocholithiasis. The main pancreatic duct is not   dilated.     In the upper left kidney, there is a small area of striated   hypoenhancement, suggestive of pyelonephritis. Small nonenhancing foci   within this area of striated hypoenhancement in the upper left kidney   measuring up to 8 mm, suggestive of associated microabscesses. Follow-up   abdominal MR in 4-6 weeks may be pursued to ensure resolution or   improvement, and to rule out malignant neoplastic process.    Other findings as above.    < end of copied text >    Radiology Results      Meds    MEDICATIONS  (STANDING):  ALBUTerol    90 MICROgram(s) HFA Inhaler 1 Puff(s) Inhalation every 6 hours  buDESOnide  80 MICROgram(s)/formoterol 4.5 MICROgram(s) Inhaler 2 Puff(s) Inhalation two times a day  cefTRIAXone   IVPB 1 Gram(s) IV Intermittent every 24 hours  enoxaparin Injectable 40 milliGRAM(s) SubCutaneous daily  ergocalciferol 01615 Unit(s) Oral <User Schedule>  ferrous    sulfate 325 milliGRAM(s) Oral daily  melatonin 3 milliGRAM(s) Oral at bedtime  metroNIDAZOLE  IVPB 500 milliGRAM(s) IV Intermittent every 8 hours  pantoprazole    Tablet 40 milliGRAM(s) Oral before breakfast  potassium chloride    Tablet ER 40 milliEquivalent(s) Oral every 4 hours  tamsulosin 0.4 milliGRAM(s) Oral at bedtime      MEDICATIONS  (PRN):  acetaminophen   Tablet .. 650 milliGRAM(s) Oral every 6 hours PRN Temp greater or equal to 38C (100.4F)  zolpidem 5 milliGRAM(s) Oral at bedtime PRN Insomnia      Physical Exam    Neuro :  no focal deficits  Respiratory: CTA B/L  CV: RRR, S1S2, no murmurs,   Abdominal: Soft, NT, ND +BS,  Extremities: No edema, + peripheral pulses    ASSESSMENT    left pyelonephritis with micro abcesses,   left distal ureteral stone,   colitis,   duodenitis,   anemia,   h/o asthma  COPD (chronic obstructive pulmonary disease)  S/P thyroid surgery  S/P RANDOLPH-BSO      PLAN      urology f/u noted  cont flomax  strain all urine indication of kidney stones  s/p MRCP noted above, striated hypoenhancement in the upper left kidney   measuring up to 8 mm, suggestive of associated microabscesses  f/u cultures  f/u repeat renal US  id f/u noted  cont Rocephin and flagyl  blood cx neg noted   u cx neg noted   renal bladder sono neg results noted. There is no hydronephrosis.  Multiple cysts are noted in the left kidney. Nonspecific small echogenic   foci noted within the left kidney  f/u anemia panel  gi f/u   f/u EGD  f/u stool studies    c diff negative noted   pulm f/u noted  cont current meds Patient is a 63y old  Female who presents with a chief complaint of lower abdominal pain for 4 days (13 Sep 2018 07:56)    pt seen in icu [  ], reg med floor [ x  ], bed [x  ], chair at bedside [   ], a+o x3 [ x ], lethargic [  ],  nad [x ]        Allergies    Motrin (Swelling)        Vitals    T(F): 97.9 (09-13-18 @ 05:11), Max: 98.8 (09-12-18 @ 21:55)  HR: 74 (09-13-18 @ 05:11) (74 - 79)  BP: 128/65 (09-13-18 @ 05:11) (128/65 - 139/65)  RR: 16 (09-13-18 @ 05:11) (16 - 16)  SpO2: 99% (09-13-18 @ 05:11) (99% - 100%)  Wt(kg): --  CAPILLARY BLOOD GLUCOSE          Labs                          8.8    12.3  )-----------( 290      ( 13 Sep 2018 06:56 )             27.3       09-13    138  |  108  |  9   ----------------------------<  88  3.2<L>   |  19<L>  |  0.74    Ca    7.6<L>      13 Sep 2018 06:56  Phos  2.4     09-13  Mg     1.6     09-13              .Stool Feces  09-12 @ 17:10   Testing in progress  --  --      .Stool Feces  09-11 @ 18:39   Few Yeast like cells  No enteric pathogens to date: Final culture pending  No enteric gram negative rods isolated  --  --      .Blood Blood-Peripheral  09-10 @ 23:03   No growth to date.  --  --      .Blood Blood-Peripheral  09-10 @ 23:02   No growth to date.  --  --      .Urine Clean Catch (Midstream)  09-09 @ 21:33   <10,000 CFU/ml Normal Urogenital zehra present  --  --        < from: MR MRCP w/wo IV Cont (09.12.18 @ 19:33) >  Impression: The common bile duct measures up to 6 mm in caliber which is   within normal limits. No suspicious filling defect in the common bile   duct to suggest choledocholithiasis. The main pancreatic duct is not   dilated.     In the upper left kidney, there is a small area of striated   hypoenhancement, suggestive of pyelonephritis. Small nonenhancing foci   within this area of striated hypoenhancement in the upper left kidney   measuring up to 8 mm, suggestive of associated microabscesses. Follow-up   abdominal MR in 4-6 weeks may be pursued to ensure resolution or   improvement, and to rule out malignant neoplastic process.    Other findings as above.    < end of copied text >    Radiology Results      Meds    MEDICATIONS  (STANDING):  ALBUTerol    90 MICROgram(s) HFA Inhaler 1 Puff(s) Inhalation every 6 hours  buDESOnide  80 MICROgram(s)/formoterol 4.5 MICROgram(s) Inhaler 2 Puff(s) Inhalation two times a day  cefTRIAXone   IVPB 1 Gram(s) IV Intermittent every 24 hours  enoxaparin Injectable 40 milliGRAM(s) SubCutaneous daily  ergocalciferol 36439 Unit(s) Oral <User Schedule>  ferrous    sulfate 325 milliGRAM(s) Oral daily  melatonin 3 milliGRAM(s) Oral at bedtime  metroNIDAZOLE  IVPB 500 milliGRAM(s) IV Intermittent every 8 hours  pantoprazole    Tablet 40 milliGRAM(s) Oral before breakfast  potassium chloride    Tablet ER 40 milliEquivalent(s) Oral every 4 hours  tamsulosin 0.4 milliGRAM(s) Oral at bedtime      MEDICATIONS  (PRN):  acetaminophen   Tablet .. 650 milliGRAM(s) Oral every 6 hours PRN Temp greater or equal to 38C (100.4F)  zolpidem 5 milliGRAM(s) Oral at bedtime PRN Insomnia      Physical Exam    Neuro :  no focal deficits  Respiratory: CTA B/L  CV: RRR, S1S2, no murmurs,   Abdominal: Soft, NT, ND +BS,  Extremities: No edema, + peripheral pulses    ASSESSMENT    left pyelonephritis with micro abcesses,   left distal ureteral stone,   colitis,   duodenitis,   anemia,   h/o asthma  COPD (chronic obstructive pulmonary disease)  S/P thyroid surgery  S/P RANDOLPH-BSO      PLAN      urology f/u noted  cont flomax  strain all urine indication of kidney stones  s/p MRCP noted above, striated hypoenhancement in the upper left kidney   measuring up to 8 mm, suggestive of associated microabscesses  Follow-up abd MRI in 4-6 weeks as per radiology  f/u cultures  f/u repeat renal US  id f/u noted  cont Rocephin and flagyl  blood cx neg noted   u cx neg noted   renal bladder sono neg results noted. There is no hydronephrosis.  Multiple cysts are noted in the left kidney. Nonspecific small echogenic   foci noted within the left kidney  f/u anemia panel  gi f/u   f/u EGD  f/u stool studies    c diff negative noted   pulm f/u noted  adv diet as tolerated   cont current meds Patient is a 63y old  Female who presents with a chief complaint of lower abdominal pain for 4 days (13 Sep 2018 07:56)    pt seen in icu [  ], reg med floor [ x  ], bed [x  ], chair at bedside [   ], a+o x3 [ x ], lethargic [  ],  nad [x ]        Allergies    Motrin (Swelling)        Vitals    T(F): 97.9 (09-13-18 @ 05:11), Max: 98.8 (09-12-18 @ 21:55)  HR: 74 (09-13-18 @ 05:11) (74 - 79)  BP: 128/65 (09-13-18 @ 05:11) (128/65 - 139/65)  RR: 16 (09-13-18 @ 05:11) (16 - 16)  SpO2: 99% (09-13-18 @ 05:11) (99% - 100%)  Wt(kg): --  CAPILLARY BLOOD GLUCOSE          Labs                          8.8    12.3  )-----------( 290      ( 13 Sep 2018 06:56 )             27.3       09-13    138  |  108  |  9   ----------------------------<  88  3.2<L>   |  19<L>  |  0.74    Ca    7.6<L>      13 Sep 2018 06:56  Phos  2.4     09-13  Mg     1.6     09-13              .Stool Feces  09-12 @ 17:10   Testing in progress  --  --      .Stool Feces  09-11 @ 18:39   Few Yeast like cells  No enteric pathogens to date: Final culture pending  No enteric gram negative rods isolated  --  --      .Blood Blood-Peripheral  09-10 @ 23:03   No growth to date.  --  --      .Blood Blood-Peripheral  09-10 @ 23:02   No growth to date.  --  --      .Urine Clean Catch (Midstream)  09-09 @ 21:33   <10,000 CFU/ml Normal Urogenital zehra present  --  --        < from: MR MRCP w/wo IV Cont (09.12.18 @ 19:33) >  Impression: The common bile duct measures up to 6 mm in caliber which is   within normal limits. No suspicious filling defect in the common bile   duct to suggest choledocholithiasis. The main pancreatic duct is not   dilated.     In the upper left kidney, there is a small area of striated   hypoenhancement, suggestive of pyelonephritis. Small nonenhancing foci   within this area of striated hypoenhancement in the upper left kidney   measuring up to 8 mm, suggestive of associated microabscesses. Follow-up   abdominal MR in 4-6 weeks may be pursued to ensure resolution or   improvement, and to rule out malignant neoplastic process.    Other findings as above.    < end of copied text >    Radiology Results      Meds    MEDICATIONS  (STANDING):  ALBUTerol    90 MICROgram(s) HFA Inhaler 1 Puff(s) Inhalation every 6 hours  buDESOnide  80 MICROgram(s)/formoterol 4.5 MICROgram(s) Inhaler 2 Puff(s) Inhalation two times a day  cefTRIAXone   IVPB 1 Gram(s) IV Intermittent every 24 hours  enoxaparin Injectable 40 milliGRAM(s) SubCutaneous daily  ergocalciferol 50202 Unit(s) Oral <User Schedule>  ferrous    sulfate 325 milliGRAM(s) Oral daily  melatonin 3 milliGRAM(s) Oral at bedtime  metroNIDAZOLE  IVPB 500 milliGRAM(s) IV Intermittent every 8 hours  pantoprazole    Tablet 40 milliGRAM(s) Oral before breakfast  potassium chloride    Tablet ER 40 milliEquivalent(s) Oral every 4 hours  tamsulosin 0.4 milliGRAM(s) Oral at bedtime      MEDICATIONS  (PRN):  acetaminophen   Tablet .. 650 milliGRAM(s) Oral every 6 hours PRN Temp greater or equal to 38C (100.4F)  zolpidem 5 milliGRAM(s) Oral at bedtime PRN Insomnia      Physical Exam    Neuro :  no focal deficits  Respiratory: CTA B/L  CV: RRR, S1S2, no murmurs,   Abdominal: Soft, NT, ND +BS,  Extremities: No edema, + peripheral pulses    ASSESSMENT    left pyelonephritis with micro abcesses,   left distal ureteral stone,   colitis,   duodenitis,   anemia,   h/o asthma  COPD (chronic obstructive pulmonary disease)  S/P thyroid surgery  S/P RANDOLPH-BSO      PLAN      urology f/u noted  cont flomax  strain all urine indication of kidney stones  s/p MRCP noted above, striated hypoenhancement in the upper left kidney   measuring up to 8 mm, suggestive of associated microabscesses  Follow-up abd MRI in 4-6 weeks as per radiology  id f/u noted  cont Rocephin and flagyl  blood cx neg noted   u cx neg noted   renal bladder sono neg results noted. There is no hydronephrosis.  Multiple cysts are noted in the left kidney. Nonspecific small echogenic   foci noted within the left kidney  f/u anemia panel  gi f/u   f/u EGD  f/u stool studies    c diff negative noted   pulm f/u noted  adv diet as tolerated   cont current meds

## 2018-09-13 NOTE — PROGRESS NOTE ADULT - PROBLEM SELECTOR PLAN 6
CT scan showed 2mm left ureteral stone and microabscesses  may need stent placement  continue with tamsulosin  f/u with urology for stent placement. CT scan showed 2mm left ureteral stone and microabscesses  may need stent placement  continue with tamsulosin  Urology was consulted and recommended conservative management but not cystoscopy with stent placement.

## 2018-09-13 NOTE — PROGRESS NOTE ADULT - ATTENDING COMMENTS
Agree with PA note as written above. Pt with vague abdominal symptoms but currently well controlled.     Plan:  - No need for repeat US as pt had MRI today. Will follow-up on final read  - Continue to monitor WBC
Agree with note as written above. Unclear whether pt does have stone vs. phlebolith and unclear whether symptoms are from  issues vs GI issues.    - GIven that symptoms are well controlled, for now will recommend observation. Continue to monitor leukocytosis  - Renal US tomorrow or in 2 days  - FU cultures  - Tamsulosin 0.4mg in case there is a 2mm distal ureteral stone.
Agree with PA note as written above. Pt currently denies any pain. Does feel bloated every time she eats anything. Denies any urinary issues or flank pain    Plan:  - Reviewed MRCP results with kidney findings. No hydro but does have signs of pyelo.  - COntinue care per ID and primary team  - No urologic intervention at this time

## 2018-09-13 NOTE — DISCHARGE NOTE ADULT - CARE PROVIDER_API CALL
Ginette Jackson (MD; MPH), Urology  [address] [facility]  [address] [place] [address]  [place] [address]  Phone: [phone]  Fax: [phone]    Johnnie Meek), Medicine  [address] [place] [address]  Phone: [phone]  Fax: [phone]

## 2018-09-13 NOTE — PROGRESS NOTE ADULT - SUBJECTIVE AND OBJECTIVE BOX
[  ] ICU                                          [   ] CCU                                      [ X  ] Medical Floor    Patient is comfortable. No new complaints reported.  No N/V, hematemesis, hematochezia, melena, fever, chills, chest pain, SOB, cough or diarrhea reported.    VITALS  Vital Signs Last 24 Hrs  T(C): 37 (13 Sep 2018 14:33), Max: 37.1 (12 Sep 2018 21:55)  T(F): 98.6 (13 Sep 2018 14:33), Max: 98.8 (12 Sep 2018 21:55)  HR: 89 (13 Sep 2018 14:33) (74 - 89)  BP: 122/59 (13 Sep 2018 14:33) (122/59 - 139/65)   RR: 16 (13 Sep 2018 14:33) (16 - 16)  SpO2: 99% (13 Sep 2018 14:33) (99% - 100%)       MEDICATIONS  (STANDING):  ALBUTerol    90 MICROgram(s) HFA Inhaler 1 Puff(s) Inhalation every 6 hours  buDESOnide  80 MICROgram(s)/formoterol 4.5 MICROgram(s) Inhaler 2 Puff(s) Inhalation two times a day  cefTRIAXone   IVPB 1 Gram(s) IV Intermittent every 24 hours  enoxaparin Injectable 40 milliGRAM(s) SubCutaneous daily  ergocalciferol 67913 Unit(s) Oral <User Schedule>  ferrous    sulfate 325 milliGRAM(s) Oral daily  melatonin 3 milliGRAM(s) Oral at bedtime  metroNIDAZOLE  IVPB 500 milliGRAM(s) IV Intermittent every 8 hours  pantoprazole    Tablet 40 milliGRAM(s) Oral before breakfast  sodium chloride 0.9% with potassium chloride 20 mEq/L 1000 milliLiter(s) (70 mL/Hr) IV Continuous <Continuous>  tamsulosin 0.4 milliGRAM(s) Oral at bedtime    MEDICATIONS  (PRN):  acetaminophen   Tablet .. 650 milliGRAM(s) Oral every 6 hours PRN Temp greater or equal to 38C (100.4F)  zolpidem 5 milliGRAM(s) Oral at bedtime PRN Insomnia                            8.8    12.3  )-----------( 290      ( 13 Sep 2018 06:56 )             27.3       09-13    138  |  108  |  9   ----------------------------<  88  3.2<L>   |  19<L>  |  0.74    Ca    7.6<L>      13 Sep 2018 06:56  Phos  2.4     09-13  Mg     1.6     09-13        EXAM:  MR MRCP [facility]                            PROCEDURE DATE:  09/12/2018          INTERPRETATION:  MR of the abdomen without and with IV contrast including   MRCP    Indication: Dilated common bile duct.    Technique: Utilizing a 1.5 Jennifer high-field magnet, multiplanar   multisequence MR images of the abdomen are acquired without and with IV   contrast (6 cc Gadavist ministered, 4 cc discarded), supplemented by thin   and thick slab MRCP images. Postcontrast images are acquired dynamically.    Comparison: No prior abdominal MR is available for comparison. Reference   is made with a previous abdominal CT dated 9/9/2018.    Findings: The gallbladder is contracted. Small layering sludge in the   gallbladder without evidence for gallstone.No grossly thickened   gallbladder wall or pericholecystic fluid.    The common bile duct measures up to 6 mm in caliber which is within   normal limits. No suspicious filling defect in the common bile duct to   suggest choledocholithiasis. The main pancreatic duct is not dilated.     A few small cysts in the liver measuring up to 1.0 cm. The pancreas,   spleen, and adrenals appear unremarkable.    A few small cysts in the kidneys bilaterally measuring up to 1.7 cm in   the lower left kidney. This largest cyst has internal debris. In the   upper left kidney, there is a small area of striated hypoenhancement   which also was seen on the recent abdominal CT dated 9/9/2018, suggestive   of pyelonephritis. There are small nonenhancing foci withinthis area of   striated hypoenhancement in the upper left kidney measuring up to 8 mm,   suggestive of associated microabscesses.    Small ascites. No evidence for enlarged lymph node.    There is apparent mural thickening of the duodenum, suggestiveof   nonspecific duodenitis or peptic ulcer disease. This was seen on the   recent CT.    Impression: The common bile duct measures up to 6 mm in caliber which is   within normal limits. No suspicious filling defect in the common bile   duct to suggest choledocholithiasis. The main pancreatic duct is not   dilated.     In the upper left kidney, there is a small area of striated   hypoenhancement, suggestive of pyelonephritis. Small nonenhancing foci   within this area of striated hypoenhancement in the upper left kidney   measuring up to 8 mm, suggestive of associated microabscesses. Follow-up   abdominal MR in 4-6 weeks may be pursued to ensure resolution or   improvement, and to rule out malignant neoplastic process.

## 2018-09-13 NOTE — PROGRESS NOTE ADULT - SUBJECTIVE AND OBJECTIVE BOX
PGY 1 Note discussed with supervising resident and primary attending    Patient is a 63y old  Female who presents with a chief complaint of lower abdominal pain for 4 days (12 Sep 2018 13:39)      INTERVAL HPI/OVERNIGHT EVENTS: Patients abdominal pain improved and afebrile.    MEDICATIONS  (STANDING):  ALBUTerol    90 MICROgram(s) HFA Inhaler 1 Puff(s) Inhalation every 6 hours  buDESOnide  80 MICROgram(s)/formoterol 4.5 MICROgram(s) Inhaler 2 Puff(s) Inhalation two times a day  cefTRIAXone   IVPB 1 Gram(s) IV Intermittent every 24 hours  enoxaparin Injectable 40 milliGRAM(s) SubCutaneous daily  ergocalciferol 46934 Unit(s) Oral <User Schedule>  ferrous    sulfate 325 milliGRAM(s) Oral daily  melatonin 3 milliGRAM(s) Oral at bedtime  metroNIDAZOLE  IVPB 500 milliGRAM(s) IV Intermittent every 8 hours  pantoprazole    Tablet 40 milliGRAM(s) Oral before breakfast  sodium chloride 0.9%. 1000 milliLiter(s) (125 mL/Hr) IV Continuous <Continuous>  tamsulosin 0.4 milliGRAM(s) Oral at bedtime    MEDICATIONS  (PRN):  acetaminophen   Tablet .. 650 milliGRAM(s) Oral every 6 hours PRN Temp greater or equal to 38C (100.4F)  zolpidem 5 milliGRAM(s) Oral at bedtime PRN Insomnia      __________________________________________________  REVIEW OF SYSTEMS:    CONSTITUTIONAL: No fever,   EYES: no acute visual disturbances  NECK: No pain or stiffness  RESPIRATORY: No cough; No shortness of breath  CARDIOVASCULAR: No chest pain, no palpitations  GASTROINTESTINAL:  pain. No nausea or vomiting; No diarrhea   NEUROLOGICAL: No headache or numbness, no tremors  MUSCULOSKELETAL: No joint pain, no muscle pain  GENITOURINARY: no dysuria, no frequency, no hesitancy  PSYCHIATRY: no depression , no anxiety  ALL OTHER  ROS negative        Vital Signs Last 24 Hrs  T(C): 36.6 (13 Sep 2018 05:11), Max: 37.1 (12 Sep 2018 21:55)  T(F): 97.9 (13 Sep 2018 05:11), Max: 98.8 (12 Sep 2018 21:55)  HR: 74 (13 Sep 2018 05:11) (74 - 79)  BP: 128/65 (13 Sep 2018 05:11) (128/65 - 139/65)  BP(mean): --  RR: 16 (13 Sep 2018 05:11) (16 - 16)  SpO2: 99% (13 Sep 2018 05:11) (99% - 100%)    ________________________________________________  PHYSICAL EXAM:  GENERAL: NAD  HEENT: Normocephalic;  conjunctivae and sclerae clear; moist mucous membranes;   NECK : supple  CHEST/LUNG: Clear to auscultation bilaterally with good air entry   HEART: S1 S2  regular; no murmurs, gallops or rubs  ABDOMEN: Soft, tender, Nondistended; Bowel sounds present  EXTREMITIES: no cyanosis; no edema; no calf tenderness  SKIN: warm and dry; no rash  NERVOUS SYSTEM:  Awake and alert; Oriented  to place, person and time ; no new deficits    _________________________________________________  LABS:                        8.8    12.3  )-----------( 290      ( 13 Sep 2018 06:56 )             27.3     09-13    138  |  108  |  9   ----------------------------<  88  3.2<L>   |  19<L>  |  0.74    Ca    7.6<L>      13 Sep 2018 06:56  Phos  2.4     09-13  Mg     1.6     09-13          CAPILLARY BLOOD GLUCOSE            RADIOLOGY & ADDITIONAL TESTS:    Imaging Personally Reviewed:  YES    Consultant(s) Notes Reviewed:   YES    Care Discussed with Consultants : YES     Plan of care was discussed with patient and /or primary care giver; all questions and concerns were addressed and care was aligned with patient's wishes.

## 2018-09-13 NOTE — DIETITIAN INITIAL EVALUATION ADULT. - MD RECOMMEND
advance diet or consider nutritional supplements if full liquid diet prolong, may consider Bacid, banana flakes as medically feasible if worsening diarrhea

## 2018-09-13 NOTE — DIETITIAN INITIAL EVALUATION ADULT. - OTHER INFO
Nutrition assessment for NPO/full liquid diet x4 to 5d; no chewing or swallowing difficulty; patient tolerating full liquid diet now but still has diarrhea today; usual body wt = 110 lb, current wt = 112.6 lb 9/12/18. No drastic changes in wt.

## 2018-09-13 NOTE — DISCHARGE NOTE ADULT - VISION (WITH CORRECTIVE LENSES IF THE PATIENT USUALLY WEARS THEM):
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

## 2018-09-13 NOTE — DISCHARGE NOTE ADULT - HOSPITAL COURSE
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 She had lower abdominal pain which she initially thought that is from holding urine for long time or form constipation but it persisted through out day so she went to PCP next day who gave her cipro and fleet enema for constipation. Her pain was dull over right lower quadrant constant, 5/10 in intensity. After enema she had small bowel movement but it did not help her with pain and she also noted some nausea. Blood work was done and blood, urine, stool cultures and C.diff were negative. CT Scan of abdomen showed 2mm stone in the Left ureter and microabscesses in the kidney. MRCP showed striated hypo enhancement in upper left kidney suggestive of microabscess. Renal bladder showed no hydronephrosis, multiple cysts in left kidney. Non specific small echogenic foci noted within the left kidney. Repeat U/S showed ----------. She was treated with Rocephin and flagyl for colitis. 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 She had lower abdominal pain which she initially thought that is from holding urine for long time or form constipation but it persisted through out day so she went to PCP next day who gave her cipro and fleet enema for constipation. Her pain was dull over right lower quadrant constant, 5/10 in intensity. After enema she had small bowel movement but it did not help her with pain and she also noted some nausea. Blood work was done and blood, urine, stool cultures and C.diff were negative. CT Scan of abdomen showed 2mm stone in the Left ureter and microabscesses in the kidney. MRCP showed striated hypo enhancement in upper left kidney suggestive of microabscess. Renal bladder showed no hydronephrosis, multiple cysts in left kidney. Non specific small echogenic foci noted within the left kidney.. She was treated with Rocephin and flagyl for colitis. She was started on Flomax for ureteral stone and outpatient follow up with Dr Jackson. EGD was done for duodenitis and It showed diaphragmatic hernia. Patient clinically improved, afebrile, pain and diarrhea got resolved. Given patient's improved clinical status and current hemodynamic stability, decision was made to discharge. Discussed with attending  Please refer to patient's complete medical chart with documents for a full hospital course, for this is only a brief summary.

## 2018-09-13 NOTE — PROGRESS NOTE ADULT - PROBLEM SELECTOR PLAN 1
qSOFA 0  presented with back pain, abdominal pain, nausea  reports that she frequently holds urine   CT abdomen/pelvis: colitis and left pyelonephritis with microabscess noted    continue  Rocephin and flagyl   Urine culture was negative  f/u blood culture  renal u/s showed multiple cysts in the kidney and small echogenic foci in left kidney.  ID Dr. Ace qSOFA 0  presented with back pain, abdominal pain, nausea  reports that she frequently holds urine

## 2018-09-13 NOTE — PROGRESS NOTE ADULT - PROBLEM SELECTOR PLAN 5
Has left ureteral stone and microabscesses  may need stent placement  Urology follow up  Renal US as per . Has left ureteral stone and microabscesses  may need stent placement  Urology follow up  cont flomax  Renal US as per .

## 2018-09-13 NOTE — DISCHARGE NOTE ADULT - CARE PLAN
Principal Discharge DX:	Pyelonephritis  Goal:	Resolution of symptoms  Assessment and plan of treatment:	You came here with abdominal pain, fever, leucocytosis.  Blood work was done and blood, urine, stool cultures and C.diff were negative. CT Scan of abdomen showed 2mm stone in the Left ureter and microabscesses in the kidney. MRCP showed striated hypo enhancement in upper left kidney suggestive of microabscess. Renal bladder showed no hydronephrosis, multiple cysts in left kidney. Non specific small echogenic foci noted within the left kidney.. You were treated with antibiotics. You were started on Flomax for ureteral stone and outpatient follow up with Dr Jackson. Follow up with your Primary medical doctor and continue Ceftin for 6 more days.  Secondary Diagnosis:	Duodenitis  Assessment and plan of treatment:	You were diagnosed to have duodenitis on ct abdomen. EGD was done and showed -------------.  Follow up with your Primary medical doctor  Secondary Diagnosis:	COPD (chronic obstructive pulmonary disease)  Assessment and plan of treatment:	You were diagnosed with COPD. Follow up with your  Pulmonologist and continue the same medication regimen.  Secondary Diagnosis:	Colitis  Assessment and plan of treatment:	You were diagnosed with colitis. Please continue the flagyl for 6 more days. Follow up with Gastroenterologist for colonoscopy in 2 weeks.

## 2018-09-13 NOTE — PROGRESS NOTE ADULT - SUBJECTIVE AND OBJECTIVE BOX
Meds:  cefTRIAXone   IVPB 1 Gram(s) IV Intermittent every 24 hours  metroNIDAZOLE  IVPB 500 milliGRAM(s) IV Intermittent every 8 hours    Allergies:  Allergies    Motrin (Swelling)    Intolerances      ROS  [  ] UNABLE TO ELICIT    General:  [  ] None  [  ] Fever  [  ] Chills  [ x ] Malaise    Skin:  [ x ] None [  ] Rash  [  ] Wound  [  ] Ulcer    HEENT:  [ x ] None  [  ] Sore Throat  [  ] Nasal congestion/ runny nose  [  ] Photophobia [  ] Neck pain      Chest:  [ x ] None   [  ] SOB  [  ] Cough  [  ] None    Cardiovascular:   [ x ] None  [  ] CP  [  ] Palpitation    Gastrointestinal:  [  ] None  [ x ] Abd pain(resolving)   [  ] Nausea    [  ] Vomiting   [  ] Diarrhea	     Genitourinary:  [ x ] None [  ] Polyuria   [  ] Urgency  [  ] Frequency  [  ] Dysuria    [  ]  Hematuria       Musculoskeletal:  [  ] None [  ] Back Pain	[  ] Body aches  [  ] Joint pain [ x ] Weakness    Neurological: [  ] None [  ]Dizziness  [  ]Visual Disturbance  [  ]Headaches   [ x ] Weakness          PHYSICAL EXAM:  Vital Signs Last 24 Hrs  T(C): 36.6 (13 Sep 2018 05:11), Max: 37.1 (12 Sep 2018 21:55)  T(F): 97.9 (13 Sep 2018 05:11), Max: 98.8 (12 Sep 2018 21:55)  HR: 74 (13 Sep 2018 05:11) (74 - 79)  BP: 128/65 (13 Sep 2018 05:11) (128/65 - 139/65)  BP(mean): --  RR: 16 (13 Sep 2018 05:11) (16 - 16)  SpO2: 99% (13 Sep 2018 05:11) (99% - 100%)    Constitutional: feels better.    HEENT: [ x ] Wnl  [  ] Pharyngeal congestion    Neck:  [ x ] Supple  [  ]Lymphadenopathy  [ x ] No JVD   [  ] JVD  [  ] Masses   [  ] WNL    CHEST/Respiratory:  [ x ]Clear to auscultation  [  ] Rales   [  ] Rhonchi   [  ] Wheezing     [  ] Chest Tenderness      Cardiovascular:  [ x ] Reg S1 S2   [  ] Irreg S1 S2   [ x ]No Murmur  [  ] +ve Murmurs  [  ]Systolic [  ]Diastolic      Abdomen:  [ x ] Soft  [  ] No tendrerness  [ x ] Tenderness (mild diffuse) [  ] Organomegaly  [  ] ABD Distention  [  ] Rigidity                       [ x ] No Regidity                       [ x ] No Rebound Tenderness  [ x ] No Guarding Rigidity  [  ] Rebound Tenderness[  ] Guarding Rigidity                          [ x ]  +ve Bowel Sounds  [  ] Decreased Bowel Sounds    [  ] Absent Bowel Sounds                            Extremities: [ x ] No edema [  ] Edema  [  ] Clubbing   [  ] Cyanosis                         [ x ] No Tender Calf muscles  [  ] Tender Calf muscles                        [ x ] Palpable peripheral pulses    Neurological: [ x ] Awake  [ x ] Alert  [ x ] Oriented  x  3                           [  ] Confused  [  ] Drowsy  [  ] respond to painful stimuli  [  ] Unresponsive    Skin:  [ x ] Intact [  ] Redness [  ] Thrombophlebitis  [  ] Rashes  [  ] Dry  [  ] Ulcers    Ortho:  [  ] Joint Swelling  [  ] Joint erythema [  ] Joint tenderness                [  ] Increased temp. to touch  [  ] DJD [ x ] WNL          LABS/DIAGNOSTIC TESTS                        8.8    12.3  )-----------( 290      ( 13 Sep 2018 06:56 )             27.3   09-13    138  |  108  |  9   ----------------------------<  88  3.2<L>   |  19<L>  |  0.74    Ca    7.6<L>      13 Sep 2018 06:56  Phos  2.4     09-13  Mg     1.6     09-13        C Diff by PCR Result: NotDetec (09.11.18 @ 17:48)          CULTURES:   Ova and Parasites (09.12.18 @ 17:10)    Culture Results:   Testing in progress    Culture - Stool (09.11.18 @ 18:39)    Specimen Source: .Stool Feces    Culture Results:   Few Yeast like cells  No enteric pathogens to date: Final culture pending  No enteric gram negative rods isolated      Culture - Blood (09.10.18 @ 23:03)    Specimen Source: .Blood Blood-Peripheral    Culture Results:   No growth to date.    Culture - Blood (09.10.18 @ 23:02)    Specimen Source: .Blood Blood-Peripheral    Culture Results:   No growth to date.      Culture - Urine (09.09.18 @ 21:33)    Specimen Source: .Urine Clean Catch (Midstream)    Culture Results:   <10,000 CFU/ml Normal Urogenital zehra present      RADIOLOGY:    EXAM:  MR MRCP WAW IC                            PROCEDURE DATE:  09/12/2018          INTERPRETATION:  MR of the abdomen without and with IV contrast including   MRCP    Indication: Dilated common bile duct.    Technique: Utilizing a 1.5 Jennifer high-field magnet, multiplanar   multisequence MR images of the abdomen are acquired without and with IV   contrast (6 cc Gadavist ministered, 4 cc discarded), supplemented by thin   and thick slab MRCP images. Postcontrast images are acquired dynamically.    Comparison: No prior abdominal MR is available for comparison. Reference   is made with a previous abdominal CT dated 9/9/2018.    Findings: The gallbladder is contracted. Small layering sludge in the   gallbladder without evidence for gallstone.No grossly thickened   gallbladder wall or pericholecystic fluid.    The common bile duct measures up to 6 mm in caliber which is within   normal limits. No suspicious filling defect in the common bile duct to   suggest choledocholithiasis. The main pancreatic duct is not dilated.     A few small cysts in the liver measuring up to 1.0 cm. The pancreas,   spleen, and adrenals appear unremarkable.    A few small cysts in the kidneys bilaterally measuring up to 1.7 cm in   the lower left kidney. This largest cyst has internal debris. In the   upper left kidney, there is a small area of striated hypoenhancement   which also was seen on the recent abdominal CT dated 9/9/2018, suggestive   of pyelonephritis. There are small nonenhancing foci withinthis area of   striated hypoenhancement in the upper left kidney measuring up to 8 mm,   suggestive of associated microabscesses.    Small ascites. No evidence for enlarged lymph node.    There is apparent mural thickening of the duodenum, suggestiveof   nonspecific duodenitis or peptic ulcer disease. This was seen on the   recent CT.    Impression: The common bile duct measures up to 6 mm in caliber which is   within normal limits. No suspicious filling defect in the common bile   duct to suggest choledocholithiasis. The main pancreatic duct is not   dilated.     In the upper left kidney, there is a small area of striated   hypoenhancement, suggestive of pyelonephritis. Small nonenhancing foci   within this area of striated hypoenhancement in the upper left kidney   measuring up to 8 mm, suggestive of associated microabscesses. Follow-up   abdominal MR in 4-6 weeks may be pursued to ensure resolution or   improvement, and to rule out malignant neoplastic process.    Other findings as above.            EXAM:  US KIDNEY(S)                            PROCEDURE DATE:  09/11/2018          INTERPRETATION:  CLINICAL STATEMENT: [nephritis]    TECHNIQUE: Ultrasound of the kidneys.    COMPARISON: CT abdomen pelvis 9/9/2018    FINDINGS:  The right kidney measures 11.2 cm in length. .    The left kidney measures 11.3 cm in length. .    There is no hydronephrosis.    Multiple cysts are noted in the left kidney. Nonspecific small echogenic   foci noted within the left kidney    IMPRESSION:  No hydronephrosis.           CXR:    EXAM:  XR CHEST AP OR PA 1V                            PROCEDURE DATE:  09/09/2018          INTERPRETATION:  History: Upper abdominal pain    Technique:  AP portable    Comparisons:  none    Findings:     Subsegmental atelectasis and volume loss inleft lower   lobe.  . Right lung is clear. No pleural effusions.    The pulmonary   vasculature and aorta are normal for age. Heart size is unremarkable.     The thorax is normal for age.    Impression: Subsegmental atelectasis left lower lobe. Mild relative   elevation of left diaphragm          EXAM:  CT ABDOMEN AND PELVIS OC IC                            PROCEDURE DATE:  09/09/2018          INTERPRETATION:  Abdominal/Pelvic CT    9/9/2018 10:28 PM    Indication: Abdominal pain and nausea, fever and diarrhea for the past 4   days    Technique: Axial images were obtained following oral and IV contrast from   the lung bases through pubic symphysis.       Reformatted coronal and   sagittal images are submitted.    Comparison: None    FINDINGS:    LUNG BASES:  There are no pleural effusions. Cystic bronchiectasis at the   left lung base  PERITONEUM:  There is no free air or focal collection.  No free fluid.  LIVER: At the dome, there is a small lobulated lesion measuring 1.2 cm   with nodular hyperenhancement probably representing hemangioma.   Subcentimeter lucencies too small to characterize..  SPLEEN: Normal.  GALLBLADDER: Contracted.  BILIARY TREE: Unremarkable.  PANCREAS: Normal.  ADRENAL GLANDS: Normal.  KIDNEYS: The left kidney demonstrates striated areas of hypoenhancement  concerning for pyelonephritis. There is a 2 mm stone in the expected   region of the left distal ureter. There are ill-defined lucencies in the   left upper kidney which probably represent microabscesses. There are   scattered cysts in the left lower kidney subcentimeter lucency in the   right kidney too small to characterize.  BOWEL: The stomach is incompletely distended.  Oral contrast is seen as   distally as rectum. There is marked thickening of the duodenum suggesting   duodenitis/peptic ulcer disease. There is no small bowel obstruction or   diverticulitis. The appendix is unremarkable. There is mild thickening of   the transverse and left colon suggesting colitis. Scattered   diverticulosis.    URINARY BLADDER: Collapsed.  PELVIC ORGANS: No pelvic masses.    There is no significant adenopathy. There is diffuse mesenteric edema.  VASCULATURE: The aorta is not dilated. There is mild atherosclerotic   vascular calcification.  RETROPERITONEUM:  There is no mass.  BONES: Unremarkable.  ABDOMINAL WALL: Unremarkable.    IMPRESSION:    Left-sided pyelonephritis with small microabscesses. Mild left renal   pelvic fullness without hydroureter. A 2 mm stone in the course of the   left distal ureter may represent an obstructing stone as opposed to   phlebolith; difficult to assess without ureteral distention or previous   study for comparison. Duodenitis.  Mild colitis.    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 She had lower abdominal pain on thursday morning which she initially thought that is from holding urine for long time or form constipation but it persisted through out day so she went to PCP next day who gave her Cipro and fleet enema for constipation.  Ct abdomen suggested left pyelonephritis and mild colitis.  Patient was started on IV Rocephin and Flagyl.  9/12/18 Patient feels better and is scheduled for cystoscopy.    Problem/Recommendation - 1:  Problem: Pyelonephritis.   Recommendation:   1- UA & CS suggest contamination.  2- Follow Blood cultures to final reports.  3- Renal and bladder sonogram result is noted.  4- Continue Rocephin 1 gm IVPB q day.  5- IV hydration.  6- CBC and BMP follow up.  7- Continue IV Flagyl for colitis.  8- Urology follow up.  9- Follow-up abdominal MRI in 4-6 weeks as per radiology.    Problem/Recommendation - 2:  ·  Problem: Colitis.    Recommendation:   1- IV hydration.  2- stool for ova and parasites.  3- Stool for culture is noted.  4- stool for CDT was -VE.  5- CBC & BMP follow up.   6- ABX as per problem #1.     Problem/Recommendation - 3:  ·  Problem: COPD (chronic obstructive pulmonary disease).    Recommendation:   1- Bronchodilators.  2- O2 as needed.  3- Pulmonary evaluation and management.  4- Steroids as per primary, and pulmonary team if needed.     Problem/Recommendation - 4:  ·  Problem: Anemia.    Recommendation:   1-  Closely monitor H & H.  2- Observe for bleeding.     Discussed with medical resident and patient.

## 2018-09-13 NOTE — PROGRESS NOTE ADULT - SUBJECTIVE AND OBJECTIVE BOX
INTERVAL HPI/OVERNIGHT EVENTS:  Pt resting comfortably. No acute complaints.   Voiding well without dysuria or hematuria.   Denies abd/back pain    MEDICATIONS  (STANDING):  ALBUTerol    90 MICROgram(s) HFA Inhaler 1 Puff(s) Inhalation every 6 hours  buDESOnide  80 MICROgram(s)/formoterol 4.5 MICROgram(s) Inhaler 2 Puff(s) Inhalation two times a day  cefTRIAXone   IVPB 1 Gram(s) IV Intermittent every 24 hours  enoxaparin Injectable 40 milliGRAM(s) SubCutaneous daily  ergocalciferol 69617 Unit(s) Oral <User Schedule>  ferrous    sulfate 325 milliGRAM(s) Oral daily  melatonin 3 milliGRAM(s) Oral at bedtime  metroNIDAZOLE  IVPB 500 milliGRAM(s) IV Intermittent every 8 hours  pantoprazole    Tablet 40 milliGRAM(s) Oral before breakfast  potassium chloride    Tablet ER 40 milliEquivalent(s) Oral every 4 hours  tamsulosin 0.4 milliGRAM(s) Oral at bedtime    MEDICATIONS  (PRN):  acetaminophen   Tablet .. 650 milliGRAM(s) Oral every 6 hours PRN Temp greater or equal to 38C (100.4F)  zolpidem 5 milliGRAM(s) Oral at bedtime PRN Insomnia      Vital Signs Last 24 Hrs  T(C): 36.6 (13 Sep 2018 05:11), Max: 37.1 (12 Sep 2018 21:55)  T(F): 97.9 (13 Sep 2018 05:11), Max: 98.8 (12 Sep 2018 21:55)  HR: 74 (13 Sep 2018 05:11) (74 - 79)  BP: 128/65 (13 Sep 2018 05:11) (128/65 - 139/65)  BP(mean): --  RR: 16 (13 Sep 2018 05:11) (16 - 16)  SpO2: 99% (13 Sep 2018 05:11) (99% - 100%)    Physical:  General: A&Ox3. NAD.  Abdomen: Soft nondistended, no suprapubic tenderness.  Back: No CVAT b/l    LABS:                        8.8    12.3  )-----------( 290      ( 13 Sep 2018 06:56 )             27.3             09-13    138  |  108  |  9   ----------------------------<  88  3.2<L>   |  19<L>  |  0.74    Ca    7.6<L>      13 Sep 2018 06:56  Phos  2.4     09-13  Mg     1.6     09-13    Culture - Urine (09.09.18 @ 21:33)    Specimen Source: .Urine Clean Catch (Midstream)    Culture Results:   <10,000 CFU/ml Normal Urogenital zehra present

## 2018-09-13 NOTE — DISCHARGE NOTE ADULT - ADDITIONAL INSTRUCTIONS
Please follow up with Dr Jackson as outpatient.  Please follow up with your primary medical doctor in a week.  Please follow up with your Gastroenterologist in 2 weeks for colonoscopy.

## 2018-09-14 LAB
ANION GAP SERPL CALC-SCNC: 9 MMOL/L — SIGNIFICANT CHANGE UP (ref 5–17)
BASOPHILS # BLD AUTO: 0.1 K/UL — SIGNIFICANT CHANGE UP (ref 0–0.2)
BASOPHILS NFR BLD AUTO: 1 % — SIGNIFICANT CHANGE UP (ref 0–2)
BUN SERPL-MCNC: 7 MG/DL — SIGNIFICANT CHANGE UP (ref 7–18)
CALCIUM SERPL-MCNC: 7.8 MG/DL — LOW (ref 8.4–10.5)
CHLORIDE SERPL-SCNC: 108 MMOL/L — SIGNIFICANT CHANGE UP (ref 96–108)
CO2 SERPL-SCNC: 21 MMOL/L — LOW (ref 22–31)
CREAT SERPL-MCNC: 0.74 MG/DL — SIGNIFICANT CHANGE UP (ref 0.5–1.3)
EOSINOPHIL # BLD AUTO: 0.2 K/UL — SIGNIFICANT CHANGE UP (ref 0–0.5)
EOSINOPHIL NFR BLD AUTO: 1.5 % — SIGNIFICANT CHANGE UP (ref 0–6)
GLUCOSE SERPL-MCNC: 102 MG/DL — HIGH (ref 70–99)
HCT VFR BLD CALC: 28.3 % — LOW (ref 34.5–45)
HGB BLD-MCNC: 9 G/DL — LOW (ref 11.5–15.5)
LYMPHOCYTES # BLD AUTO: 19.8 % — SIGNIFICANT CHANGE UP (ref 13–44)
LYMPHOCYTES # BLD AUTO: 2.6 K/UL — SIGNIFICANT CHANGE UP (ref 1–3.3)
MCHC RBC-ENTMCNC: 28.5 PG — SIGNIFICANT CHANGE UP (ref 27–34)
MCHC RBC-ENTMCNC: 31.9 GM/DL — LOW (ref 32–36)
MCV RBC AUTO: 89.2 FL — SIGNIFICANT CHANGE UP (ref 80–100)
MONOCYTES # BLD AUTO: 1.7 K/UL — HIGH (ref 0–0.9)
MONOCYTES NFR BLD AUTO: 12.7 % — SIGNIFICANT CHANGE UP (ref 2–14)
NEUTROPHILS # BLD AUTO: 8.5 K/UL — HIGH (ref 1.8–7.4)
NEUTROPHILS NFR BLD AUTO: 65.1 % — SIGNIFICANT CHANGE UP (ref 43–77)
PLATELET # BLD AUTO: 370 K/UL — SIGNIFICANT CHANGE UP (ref 150–400)
POTASSIUM SERPL-MCNC: 3.6 MMOL/L — SIGNIFICANT CHANGE UP (ref 3.5–5.3)
POTASSIUM SERPL-SCNC: 3.6 MMOL/L — SIGNIFICANT CHANGE UP (ref 3.5–5.3)
RBC # BLD: 3.17 M/UL — LOW (ref 3.8–5.2)
RBC # FLD: 14.3 % — SIGNIFICANT CHANGE UP (ref 10.3–14.5)
SODIUM SERPL-SCNC: 138 MMOL/L — SIGNIFICANT CHANGE UP (ref 135–145)
WBC # BLD: 13 K/UL — HIGH (ref 3.8–10.5)
WBC # FLD AUTO: 13 K/UL — HIGH (ref 3.8–10.5)

## 2018-09-14 PROCEDURE — 88305 TISSUE EXAM BY PATHOLOGIST: CPT | Mod: 26

## 2018-09-14 PROCEDURE — 88312 SPECIAL STAINS GROUP 1: CPT | Mod: 26

## 2018-09-14 RX ORDER — SODIUM CHLORIDE 9 MG/ML
1000 INJECTION, SOLUTION INTRAVENOUS
Qty: 0 | Refills: 0 | Status: DISCONTINUED | OUTPATIENT
Start: 2018-09-14 | End: 2018-09-17

## 2018-09-14 RX ADMIN — Medication 100 MILLIGRAM(S): at 05:04

## 2018-09-14 RX ADMIN — ZOLPIDEM TARTRATE 5 MILLIGRAM(S): 10 TABLET ORAL at 22:48

## 2018-09-14 RX ADMIN — TAMSULOSIN HYDROCHLORIDE 0.4 MILLIGRAM(S): 0.4 CAPSULE ORAL at 22:43

## 2018-09-14 RX ADMIN — Medication 100 MILLIGRAM(S): at 22:43

## 2018-09-14 RX ADMIN — PANTOPRAZOLE SODIUM 40 MILLIGRAM(S): 20 TABLET, DELAYED RELEASE ORAL at 05:06

## 2018-09-14 RX ADMIN — BUDESONIDE AND FORMOTEROL FUMARATE DIHYDRATE 2 PUFF(S): 160; 4.5 AEROSOL RESPIRATORY (INHALATION) at 22:45

## 2018-09-14 RX ADMIN — Medication 100 MILLIGRAM(S): at 17:32

## 2018-09-14 NOTE — PROGRESS NOTE ADULT - SUBJECTIVE AND OBJECTIVE BOX
Patient is a 63y old  Female who presents with a chief complaint of lower abdominal pain for 4 days (14 Sep 2018 08:51)    Awake, alert, comfortable in bed in NAD. Had EGD because of abdominal pain. Denies cough or sob at rest .  Pt has no new complaints.       INTERVAL HPI/OVERNIGHT EVENTS:      VITAL SIGNS:  T(F): 98.3 (09-14-18 @ 05:27)  HR: 73 (09-14-18 @ 05:27)  BP: 136/67 (09-14-18 @ 05:27)  RR: 16 (09-14-18 @ 05:27)  SpO2: 98% (09-14-18 @ 05:27)  Wt(kg): --  I&O's Detail          REVIEW OF SYSTEMS:    CONSTITUTIONAL:  No fevers, chills, sweats    HEENT:  Eyes:  No diplopia or blurred vision. ENT:  No earache, sore throat or runny nose.    CARDIOVASCULAR:  No pressure, squeezing, tightness, or heaviness about the chest; no palpitations.    RESPIRATORY:  Per HPI    GASTROINTESTINAL:  + abdominal pain, no nausea, vomiting or diarrhea.    GENITOURINARY:  No dysuria, frequency or urgency.    NEUROLOGIC:  No paresthesias, fasciculations, seizures or weakness.    PSYCHIATRIC:  No disorder of thought or mood.      PHYSICAL EXAM:    Constitutional: Well developed and nourished  Eyes:Perrla  ENMT: normal  Neck:supple  Respiratory: + Wheezing posteriorly  Cardiovascular: S1 S2 regular  Gastrointestinal: Soft, + mildly tender  Extremities: No edema  Vascular:normal  Neurological:Awake, alert,Ox3  Musculoskeletal:Normal      MEDICATIONS  (STANDING):  ALBUTerol    90 MICROgram(s) HFA Inhaler 1 Puff(s) Inhalation every 6 hours  buDESOnide  80 MICROgram(s)/formoterol 4.5 MICROgram(s) Inhaler 2 Puff(s) Inhalation two times a day  cefTRIAXone   IVPB 1 Gram(s) IV Intermittent every 24 hours  dextrose 5% + sodium chloride 0.9%. 1000 milliLiter(s) (70 mL/Hr) IV Continuous <Continuous>  enoxaparin Injectable 40 milliGRAM(s) SubCutaneous daily  ergocalciferol 68579 Unit(s) Oral <User Schedule>  ferrous    sulfate 325 milliGRAM(s) Oral daily  melatonin 3 milliGRAM(s) Oral at bedtime  metroNIDAZOLE  IVPB 500 milliGRAM(s) IV Intermittent every 8 hours  pantoprazole    Tablet 40 milliGRAM(s) Oral before breakfast  sodium chloride 0.9% with potassium chloride 20 mEq/L 1000 milliLiter(s) (70 mL/Hr) IV Continuous <Continuous>  tamsulosin 0.4 milliGRAM(s) Oral at bedtime    MEDICATIONS  (PRN):  acetaminophen   Tablet .. 650 milliGRAM(s) Oral every 6 hours PRN Temp greater or equal to 38C (100.4F)  zolpidem 5 milliGRAM(s) Oral at bedtime PRN Insomnia      Allergies    Motrin (Swelling)    Intolerances        LABS:                        9.0    13.0  )-----------( 370      ( 14 Sep 2018 06:41 )             28.3     09-14    138  |  108  |  7   ----------------------------<  102<H>  3.6   |  21<L>  |  0.74    Ca    7.8<L>      14 Sep 2018 06:41  Phos  2.4     09-13  Mg     1.6     09-13                CAPILLARY BLOOD GLUCOSE            RADIOLOGY & ADDITIONAL TESTS:    CXR:  < from: Xray Chest 1 View AP/PA (09.09.18 @ 20:00) >    INTERPRETATION:  History: Upper abdominal pain    Technique:  AP portable    Comparisons:  none    Findings:     Subsegmental atelectasis and volume loss inleft lower   lobe.  . Right lung is clear. No pleural effusions.    The pulmonary   vasculature and aorta are normal for age. Heart size is unremarkable.     The thorax is normal for age.    Impression: Subsegmental atelectasis left lower lobe. Mild relative   elevation of left diaphragm      < end of copied text >    Ct scan chest:  < from: CT Abdomen and Pelvis w/ Oral Cont and w/ IV Cont (09.09.18 @ 22:18) >  FINDINGS:    LUNG BASES:  There are no pleural effusions. Cystic bronchiectasis at the   left lung base    < end of copied text >  < from: MR MRCP w/wo IV Cont (09.12.18 @ 19:33) >  Impression: The common bile duct measures up to 6 mm in caliber which is   within normal limits. No suspicious filling defect in the common bile   duct to suggest choledocholithiasis. The main pancreatic duct is not   dilated.     In the upper left kidney, there is a small area of striated   hypoenhancement, suggestive of pyelonephritis. Small nonenhancing foci   within this area of striated hypoenhancement in the upper left kidney   measuring up to 8 mm, suggestive of associated microabscesses. Follow-up   abdominal MR in 4-6 weeks may be pursued to ensure resolution or   improvement, and to rule out malignant neoplastic process.    < end of copied text >    ekg;    echo: Patient is a 63y old  Female who presents with a chief complaint of lower abdominal pain for 4 days (14 Sep 2018 08:51)    Awake, alert, comfortable in bed in NAD. Will have EGD because of abdominal pain. Denies cough or sob at rest  Pt has no new complaints. Clinically stable while on antibiotics.      INTERVAL HPI/OVERNIGHT EVENTS:      VITAL SIGNS:  T(F): 98.3 (09-14-18 @ 05:27)  HR: 73 (09-14-18 @ 05:27)  BP: 136/67 (09-14-18 @ 05:27)  RR: 16 (09-14-18 @ 05:27)  SpO2: 98% (09-14-18 @ 05:27)  Wt(kg): --  I&O's Detail          REVIEW OF SYSTEMS:    CONSTITUTIONAL:  No fevers, chills, sweats    HEENT:  Eyes:  No diplopia or blurred vision. ENT:  No earache, sore throat or runny nose.    CARDIOVASCULAR:  No pressure, squeezing, tightness, or heaviness about the chest; no palpitations.    RESPIRATORY:  Per HPI    GASTROINTESTINAL:  + abdominal pain, no nausea, vomiting or diarrhea.    GENITOURINARY:  No dysuria, frequency or urgency.    NEUROLOGIC:  No paresthesias, fasciculations, seizures or weakness.    PSYCHIATRIC:  No disorder of thought or mood.      PHYSICAL EXAM:    Constitutional: Well developed and nourished  Eyes:Perrla  ENMT: normal  Neck:supple  Respiratory: + Wheezing posteriorly  Cardiovascular: S1 S2 regular  Gastrointestinal: Soft, + mildly tender  Extremities: No edema  Vascular:normal  Neurological:Awake, alert,Ox3  Musculoskeletal:Normal      MEDICATIONS  (STANDING):  ALBUTerol    90 MICROgram(s) HFA Inhaler 1 Puff(s) Inhalation every 6 hours  buDESOnide  80 MICROgram(s)/formoterol 4.5 MICROgram(s) Inhaler 2 Puff(s) Inhalation two times a day  cefTRIAXone   IVPB 1 Gram(s) IV Intermittent every 24 hours  dextrose 5% + sodium chloride 0.9%. 1000 milliLiter(s) (70 mL/Hr) IV Continuous <Continuous>  enoxaparin Injectable 40 milliGRAM(s) SubCutaneous daily  ergocalciferol 59039 Unit(s) Oral <User Schedule>  ferrous    sulfate 325 milliGRAM(s) Oral daily  melatonin 3 milliGRAM(s) Oral at bedtime  metroNIDAZOLE  IVPB 500 milliGRAM(s) IV Intermittent every 8 hours  pantoprazole    Tablet 40 milliGRAM(s) Oral before breakfast  sodium chloride 0.9% with potassium chloride 20 mEq/L 1000 milliLiter(s) (70 mL/Hr) IV Continuous <Continuous>  tamsulosin 0.4 milliGRAM(s) Oral at bedtime    MEDICATIONS  (PRN):  acetaminophen   Tablet .. 650 milliGRAM(s) Oral every 6 hours PRN Temp greater or equal to 38C (100.4F)  zolpidem 5 milliGRAM(s) Oral at bedtime PRN Insomnia      Allergies    Motrin (Swelling)    Intolerances        LABS:                        9.0    13.0  )-----------( 370      ( 14 Sep 2018 06:41 )             28.3     09-14    138  |  108  |  7   ----------------------------<  102<H>  3.6   |  21<L>  |  0.74    Ca    7.8<L>      14 Sep 2018 06:41  Phos  2.4     09-13  Mg     1.6     09-13                CAPILLARY BLOOD GLUCOSE            RADIOLOGY & ADDITIONAL TESTS:    CXR:  < from: Xray Chest 1 View AP/PA (09.09.18 @ 20:00) >    INTERPRETATION:  History: Upper abdominal pain    Technique:  AP portable    Comparisons:  none    Findings:     Subsegmental atelectasis and volume loss inleft lower   lobe.  . Right lung is clear. No pleural effusions.    The pulmonary   vasculature and aorta are normal for age. Heart size is unremarkable.     The thorax is normal for age.    Impression: Subsegmental atelectasis left lower lobe. Mild relative   elevation of left diaphragm      < end of copied text >    Ct scan chest:  < from: CT Abdomen and Pelvis w/ Oral Cont and w/ IV Cont (09.09.18 @ 22:18) >  FINDINGS:    LUNG BASES:  There are no pleural effusions. Cystic bronchiectasis at the   left lung base    < end of copied text >  < from: MR MRCP w/wo IV Cont (09.12.18 @ 19:33) >  Impression: The common bile duct measures up to 6 mm in caliber which is   within normal limits. No suspicious filling defect in the common bile   duct to suggest choledocholithiasis. The main pancreatic duct is not   dilated.     In the upper left kidney, there is a small area of striated   hypoenhancement, suggestive of pyelonephritis. Small nonenhancing foci   within this area of striated hypoenhancement in the upper left kidney   measuring up to 8 mm, suggestive of associated microabscesses. Follow-up   abdominal MR in 4-6 weeks may be pursued to ensure resolution or   improvement, and to rule out malignant neoplastic process.    < end of copied text >    ekg;    echo:  < from: US Renal (09.11.18 @ 11:59) >  IMPRESSION:  No hydronephrosis.       < end of copied text >

## 2018-09-14 NOTE — PROGRESS NOTE ADULT - SUBJECTIVE AND OBJECTIVE BOX
Patient is a 63y old  Female who presents with a chief complaint of lower abdominal pain for 4 days (14 Sep 2018 10:24)    pt seen in icu [  ], reg med floor [   ], bed [  ], chair at bedside [   ], a+o x3 [  ], lethargic [  ],  nad [  ]    hendrickson [  ], ngt [  ], peg [  ], et tube [  ], cent line [  ], picc line [  ]        Allergies    Motrin (Swelling)        Vitals    T(F): 98.3 (09-14-18 @ 05:27), Max: 99 (09-13-18 @ 21:45)  HR: 73 (09-14-18 @ 05:27) (73 - 89)  BP: 136/67 (09-14-18 @ 05:27) (122/59 - 136/67)  RR: 16 (09-14-18 @ 05:27) (16 - 17)  SpO2: 98% (09-14-18 @ 05:27) (97% - 99%)  Wt(kg): --  CAPILLARY BLOOD GLUCOSE          Labs                          9.0    13.0  )-----------( 370      ( 14 Sep 2018 06:41 )             28.3       09-14    138  |  108  |  7   ----------------------------<  102<H>  3.6   |  21<L>  |  0.74    Ca    7.8<L>      14 Sep 2018 06:41  Phos  2.4     09-13  Mg     1.6     09-13              .Stool Feces  09-12 @ 17:10   Testing in progress  --  --      .Stool Feces  09-11 @ 18:39   Few Yeast like cells  No enteric pathogens isolated.  (Stool culture examined for Salmonella,  Shigella, Campylobacter, Aeromonas, Plesiomonas,  Vibrio, E.coli O157 and Yersinia)  No enteric gram negative rods isolated  --  --      .Blood Blood-Peripheral  09-10 @ 23:03   No growth to date.  --  --      .Blood Blood-Peripheral  09-10 @ 23:02   No growth to date.  --  --      .Urine Clean Catch (Midstream)  09-09 @ 21:33   <10,000 CFU/ml Normal Urogenital zehra present  --  --          Radiology Results      Meds    MEDICATIONS  (STANDING):  ALBUTerol    90 MICROgram(s) HFA Inhaler 1 Puff(s) Inhalation every 6 hours  buDESOnide  80 MICROgram(s)/formoterol 4.5 MICROgram(s) Inhaler 2 Puff(s) Inhalation two times a day  cefTRIAXone   IVPB 1 Gram(s) IV Intermittent every 24 hours  dextrose 5% + sodium chloride 0.9%. 1000 milliLiter(s) (70 mL/Hr) IV Continuous <Continuous>  enoxaparin Injectable 40 milliGRAM(s) SubCutaneous daily  ergocalciferol 97570 Unit(s) Oral <User Schedule>  ferrous    sulfate 325 milliGRAM(s) Oral daily  melatonin 3 milliGRAM(s) Oral at bedtime  metroNIDAZOLE  IVPB 500 milliGRAM(s) IV Intermittent every 8 hours  pantoprazole    Tablet 40 milliGRAM(s) Oral before breakfast  sodium chloride 0.9% with potassium chloride 20 mEq/L 1000 milliLiter(s) (70 mL/Hr) IV Continuous <Continuous>  tamsulosin 0.4 milliGRAM(s) Oral at bedtime      MEDICATIONS  (PRN):  acetaminophen   Tablet .. 650 milliGRAM(s) Oral every 6 hours PRN Temp greater or equal to 38C (100.4F)  zolpidem 5 milliGRAM(s) Oral at bedtime PRN Insomnia      Physical Exam    Neuro :  no focal deficits  Respiratory: CTA B/L  CV: RRR, S1S2, no murmurs,   Abdominal: Soft, NT, ND +BS,  Extremities: No edema, + peripheral pulses    ASSESSMENT    Noninfectious gastroenteritis  COPD (chronic obstructive pulmonary disease)  S/P thyroid surgery  S/P RANDOLPH-BSO      PLAN Patient is a 63y old  Female who presents with a chief complaint of lower abdominal pain for 4 days (14 Sep 2018 10:24)      pt seen in icu [  ], reg med floor [ x  ], bed [x  ], chair at bedside [   ], a+o x3 [ x ], lethargic [  ],  nad [x ]      Allergies    Motrin (Swelling)        Vitals    T(F): 98.3 (09-14-18 @ 05:27), Max: 99 (09-13-18 @ 21:45)  HR: 73 (09-14-18 @ 05:27) (73 - 89)  BP: 136/67 (09-14-18 @ 05:27) (122/59 - 136/67)  RR: 16 (09-14-18 @ 05:27) (16 - 17)  SpO2: 98% (09-14-18 @ 05:27) (97% - 99%)  Wt(kg): --  CAPILLARY BLOOD GLUCOSE          Labs                          9.0    13.0  )-----------( 370      ( 14 Sep 2018 06:41 )             28.3       09-14    138  |  108  |  7   ----------------------------<  102<H>  3.6   |  21<L>  |  0.74    Ca    7.8<L>      14 Sep 2018 06:41  Phos  2.4     09-13  Mg     1.6     09-13              .Stool Feces  09-12 @ 17:10   Testing in progress  --  --      .Stool Feces  09-11 @ 18:39   Few Yeast like cells  No enteric pathogens isolated.  (Stool culture examined for Salmonella,  Shigella, Campylobacter, Aeromonas, Plesiomonas,  Vibrio, E.coli O157 and Yersinia)  No enteric gram negative rods isolated  --  --      .Blood Blood-Peripheral  09-10 @ 23:03   No growth to date.  --  --      .Blood Blood-Peripheral  09-10 @ 23:02   No growth to date.  --  --      .Urine Clean Catch (Midstream)  09-09 @ 21:33   <10,000 CFU/ml Normal Urogenital zehra present  --  --          Radiology Results      Meds    MEDICATIONS  (STANDING):  ALBUTerol    90 MICROgram(s) HFA Inhaler 1 Puff(s) Inhalation every 6 hours  buDESOnide  80 MICROgram(s)/formoterol 4.5 MICROgram(s) Inhaler 2 Puff(s) Inhalation two times a day  cefTRIAXone   IVPB 1 Gram(s) IV Intermittent every 24 hours  dextrose 5% + sodium chloride 0.9%. 1000 milliLiter(s) (70 mL/Hr) IV Continuous <Continuous>  enoxaparin Injectable 40 milliGRAM(s) SubCutaneous daily  ergocalciferol 30604 Unit(s) Oral <User Schedule>  ferrous    sulfate 325 milliGRAM(s) Oral daily  melatonin 3 milliGRAM(s) Oral at bedtime  metroNIDAZOLE  IVPB 500 milliGRAM(s) IV Intermittent every 8 hours  pantoprazole    Tablet 40 milliGRAM(s) Oral before breakfast  sodium chloride 0.9% with potassium chloride 20 mEq/L 1000 milliLiter(s) (70 mL/Hr) IV Continuous <Continuous>  tamsulosin 0.4 milliGRAM(s) Oral at bedtime      MEDICATIONS  (PRN):  acetaminophen   Tablet .. 650 milliGRAM(s) Oral every 6 hours PRN Temp greater or equal to 38C (100.4F)  zolpidem 5 milliGRAM(s) Oral at bedtime PRN Insomnia      Physical Exam      Neuro :  no focal deficits  Respiratory: CTA B/L  CV: RRR, S1S2, no murmurs,   Abdominal: Soft, NT, ND +BS,  Extremities: No edema, + peripheral pulses    ASSESSMENT    left pyelonephritis with micro abcesses,   left distal ureteral stone,   colitis,   duodenitis,   anemia,   h/o asthma  COPD (chronic obstructive pulmonary disease)  S/P thyroid surgery  S/P RANDOLPH-BSO      PLAN      urology f/u noted  no surgical intervention at this time  outpatient f/u with Dr. Jackson.  cont flomax  strain all urine indication of kidney stones  s/p MRCP noted above, striated hypoenhancement in the upper left kidney   measuring up to 8 mm, suggestive of associated microabscesses  Follow-up abd MRI in 4-6 weeks as per radiology  id f/u noted  cont Rocephin and flagyl  blood cx neg noted   u cx neg noted   renal bladder sono neg results noted. There is no hydronephrosis. Multiple cysts are noted in the left kidney.   Nonspecific small echogenic foci noted within the left kidney  gi f/u   f/u EGD  stool studies neg noted above  c diff negative noted   pulm f/u noted  adv diet as tolerated   cont current meds

## 2018-09-14 NOTE — PROGRESS NOTE ADULT - PROBLEM SELECTOR PLAN 1
complete abx course  adv to reg diet if medically able  no surgical intervention at this time. please continue current medical care and reconsult urology as needed

## 2018-09-14 NOTE — PROGRESS NOTE ADULT - PROBLEM SELECTOR PLAN 5
Has left ureteral stone and microabscesses  may need stent placement  Urology follow up  cont flomax  Renal US as per . Has left ureteral stone and microabscesses  may need stent placement  Urology follow up  cont flomax  Renal US results noted

## 2018-09-14 NOTE — PROGRESS NOTE ADULT - SUBJECTIVE AND OBJECTIVE BOX
63y Female with no overnight complaints. Tolerating full liquids.   voiding well without issues. no hematuria noted  no nausea or vomitting  on IV abx    Vital Signs:  T(C): 36.8 (09-14-18 @ 05:27), Max: 37.2 (09-13-18 @ 21:45)  HR: 73 (09-14-18 @ 05:27) (73 - 89)  BP: 136/67 (09-14-18 @ 05:27) (122/59 - 136/67)  RR: 16 (09-14-18 @ 05:27) (16 - 17)  SpO2: 98% (09-14-18 @ 05:27) (97% - 99%)  Wt(kg): --    Physical Exam:  General: NAD, comfortable  Abdomen:  soft, NT/ND. no CVA tenderness b/l                          9.0    13.0  )-----------( 370      ( 14 Sep 2018 06:41 )             28.3   09-14    138  |  108  |  7   ----------------------------<  102<H>  3.6   |  21<L>  |  0.74    Ca    7.8<L>      14 Sep 2018 06:41  Phos  2.4     09-13  Mg     1.6     09-13

## 2018-09-14 NOTE — PROGRESS NOTE ADULT - SUBJECTIVE AND OBJECTIVE BOX
PGY 1 Note discussed with supervising resident and primary attending    Patient is a 63y old  Female who presents with a chief complaint of lower abdominal pain for 4 days (14 Sep 2018 11:00)      INTERVAL HPI/OVERNIGHT EVENTS: Patient complains of mild abdominal pain.    MEDICATIONS  (STANDING):  ALBUTerol    90 MICROgram(s) HFA Inhaler 1 Puff(s) Inhalation every 6 hours  buDESOnide  80 MICROgram(s)/formoterol 4.5 MICROgram(s) Inhaler 2 Puff(s) Inhalation two times a day  cefTRIAXone   IVPB 1 Gram(s) IV Intermittent every 24 hours  dextrose 5% + sodium chloride 0.9%. 1000 milliLiter(s) (70 mL/Hr) IV Continuous <Continuous>  enoxaparin Injectable 40 milliGRAM(s) SubCutaneous daily  ergocalciferol 24397 Unit(s) Oral <User Schedule>  ferrous    sulfate 325 milliGRAM(s) Oral daily  melatonin 3 milliGRAM(s) Oral at bedtime  metroNIDAZOLE  IVPB 500 milliGRAM(s) IV Intermittent every 8 hours  pantoprazole    Tablet 40 milliGRAM(s) Oral before breakfast  sodium chloride 0.9% with potassium chloride 20 mEq/L 1000 milliLiter(s) (70 mL/Hr) IV Continuous <Continuous>  tamsulosin 0.4 milliGRAM(s) Oral at bedtime    MEDICATIONS  (PRN):  acetaminophen   Tablet .. 650 milliGRAM(s) Oral every 6 hours PRN Temp greater or equal to 38C (100.4F)  zolpidem 5 milliGRAM(s) Oral at bedtime PRN Insomnia      __________________________________________________  REVIEW OF SYSTEMS:    CONSTITUTIONAL: No fever,   EYES: no acute visual disturbances  NECK: No pain or stiffness  RESPIRATORY: No cough; No shortness of breath  CARDIOVASCULAR: No chest pain, no palpitations  GASTROINTESTINAL:  pain. No nausea or vomiting; No diarrhea   NEUROLOGICAL: No headache or numbness, no tremors  MUSCULOSKELETAL: No joint pain, no muscle pain  GENITOURINARY: no dysuria, no frequency, no hesitancy  PSYCHIATRY: no depression , no anxiety  ALL OTHER  ROS negative        Vital Signs Last 24 Hrs  T(C): 36.7 (14 Sep 2018 14:35), Max: 37.2 (13 Sep 2018 21:45)  T(F): 98.1 (14 Sep 2018 14:35), Max: 99 (13 Sep 2018 21:45)  HR: 87 (14 Sep 2018 14:35) (73 - 87)  BP: 148/79 (14 Sep 2018 14:35) (131/61 - 148/79)  BP(mean): --  RR: 16 (14 Sep 2018 14:35) (16 - 17)  SpO2: 97% (14 Sep 2018 14:35) (97% - 98%)    ________________________________________________  PHYSICAL EXAM:  GENERAL: NAD  HEENT: Normocephalic;  conjunctivae and sclerae clear; moist mucous membranes;   NECK : supple  CHEST/LUNG: Clear to auscultation bilaterally with good air entry   HEART: S1 S2  regular; no murmurs, gallops or rubs  ABDOMEN: Soft, tender, Nondistended; Bowel sounds present  EXTREMITIES: no cyanosis; no edema; no calf tenderness  SKIN: warm and dry; no rash  NERVOUS SYSTEM:  Awake and alert; Oriented  to place, person and time ; no new deficits    _________________________________________________  LABS:                        9.0    13.0  )-----------( 370      ( 14 Sep 2018 06:41 )             28.3     09-14    138  |  108  |  7   ----------------------------<  102<H>  3.6   |  21<L>  |  0.74    Ca    7.8<L>      14 Sep 2018 06:41  Phos  2.4     09-13  Mg     1.6     09-13          CAPILLARY BLOOD GLUCOSE            RADIOLOGY & ADDITIONAL TESTS:    Imaging Personally Reviewed:  YES    Consultant(s) Notes Reviewed:   YES    Care Discussed with Consultants : YES     Plan of care was discussed with patient and /or primary care giver; all questions and concerns were addressed and care was aligned with patient's wishes.

## 2018-09-14 NOTE — PROGRESS NOTE ADULT - PROBLEM SELECTOR PLAN 6
CT scan showed 2mm left ureteral stone and microabscesses  may need stent placement  continue with tamsulosin  Urology was consulted and recommended conservative management but not cystoscopy with stent placement. CT scan showed 2mm left ureteral stone and microabscesses  may need stent placement  continue with tamsulosin  Out-patient follow up with Dr Jackson  Urology was consulted and recommended conservative management but not cystoscopy with stent placement.

## 2018-09-14 NOTE — PROGRESS NOTE ADULT - PROBLEM SELECTOR PLAN 1
presented with back pain, abdominal pain, nausea  reports that she frequently holds urine  MRCP showed striated hypoechogenicity in left kidney suggestive of pyelonephritis and microabscess of 8mm.  continue with Cipro and flagyl   IVF are given.

## 2018-09-14 NOTE — PROGRESS NOTE ADULT - PROBLEM SELECTOR PLAN 2
MRCP showed striated hypoechogenicity in left kidney suggestive of pyelonephritis and microabscess of 8mm.  f/u EGD today  f/u stool for CDT.  continue with Cipro and flagyl for colitis  Advancing the diet from liquid to regular.

## 2018-09-14 NOTE — PROGRESS NOTE ADULT - SUBJECTIVE AND OBJECTIVE BOX
Meds:  cefTRIAXone   IVPB 1 Gram(s) IV Intermittent every 24 hours  metroNIDAZOLE  IVPB 500 milliGRAM(s) IV Intermittent every 8 hours    Allergies:  Allergies    Motrin (Swelling)    Intolerances      ROS  [  ] UNABLE TO ELICIT    General:  [  ] None  [  ] Fever  [  ] Chills  [ x ] Malaise    Skin:  [ x ] None [  ] Rash  [  ] Wound  [  ] Ulcer    HEENT:  [ x ] None  [  ] Sore Throat  [  ] Nasal congestion/ runny nose  [  ] Photophobia [  ] Neck pain      Chest:  [ x ] None   [  ] SOB  [  ] Cough  [  ] None    Cardiovascular:   [ x ] None  [  ] CP  [  ] Palpitation    Gastrointestinal:  [  ] None  [ x ] Abd pain(resolving)   [  ] Nausea    [  ] Vomiting   [ x ] Diarrhea(improving)	     Genitourinary:  [ x ] None [  ] Polyuria   [  ] Urgency  [  ] Frequency  [  ] Dysuria    [  ]  Hematuria       Musculoskeletal:  [  ] None [  ] Back Pain	[  ] Body aches  [  ] Joint pain [ x ] Weakness    Neurological: [  ] None [  ]Dizziness  [  ]Visual Disturbance  [  ]Headaches   [ x ] Weakness          PHYSICAL EXAM:  Vital Signs Last 24 Hrs  T(C): 36.8 (14 Sep 2018 05:27), Max: 37.2 (13 Sep 2018 21:45)  T(F): 98.3 (14 Sep 2018 05:27), Max: 99 (13 Sep 2018 21:45)  HR: 73 (14 Sep 2018 05:27) (73 - 89)  BP: 136/67 (14 Sep 2018 05:27) (122/59 - 136/67)  BP(mean): --  RR: 16 (14 Sep 2018 05:27) (16 - 17)  SpO2: 98% (14 Sep 2018 05:27) (97% - 99%)    Constitutional: feels better.    HEENT: [ x ] Wnl  [  ] Pharyngeal congestion    Neck:  [ x ] Supple  [  ]Lymphadenopathy  [ x ] No JVD   [  ] JVD  [  ] Masses   [  ] WNL    CHEST/Respiratory:  [ x ]Clear to auscultation  [  ] Rales   [  ] Rhonchi   [  ] Wheezing     [  ] Chest Tenderness      Cardiovascular:  [ x ] Reg S1 S2   [  ] Irreg S1 S2   [ x ]No Murmur  [  ] +ve Murmurs  [  ]Systolic [  ]Diastolic      Abdomen:  [ x ] Soft  [  ] No tendrerness  [ x ] Tenderness (very mild, diffuse) [  ] Organomegaly  [  ] ABD Distention  [  ] Rigidity                       [ x ] No Regidity                       [ x ] No Rebound Tenderness  [ x ] No Guarding Rigidity  [  ] Rebound Tenderness[  ] Guarding Rigidity                          [ x ]  +ve Bowel Sounds  [  ] Decreased Bowel Sounds    [  ] Absent Bowel Sounds                            Extremities: [ x ] No edema [  ] Edema  [  ] Clubbing   [  ] Cyanosis                         [ x ] No Tender Calf muscles  [  ] Tender Calf muscles                        [ x ] Palpable peripheral pulses    Neurological: [ x ] Awake  [ x ] Alert  [ x ] Oriented  x  3                           [  ] Confused  [  ] Drowsy  [  ] respond to painful stimuli  [  ] Unresponsive    Skin:  [ x ] Intact [  ] Redness [  ] Thrombophlebitis  [  ] Rashes  [  ] Dry  [  ] Ulcers    Ortho:  [  ] Joint Swelling  [  ] Joint erythema [  ] Joint tenderness                [  ] Increased temp. to touch  [  ] DJD [ x ] WNL          LABS/DIAGNOSTIC TESTS                        9.0    13.0  )-----------( 370      ( 14 Sep 2018 06:41 )             28.3   09-14    138  |  108  |  7   ----------------------------<  102<H>  3.6   |  21<L>  |  0.74    Ca    7.8<L>      14 Sep 2018 06:41  Phos  2.4     09-13  Mg     1.6     09-13          C Diff by PCR Result: NotDetec (09.11.18 @ 17:48)          CULTURES:   Ova and Parasites (09.12.18 @ 17:10)    Culture Results:   Testing in progress    Culture - Stool (09.11.18 @ 18:39)    Specimen Source: .Stool Feces    Culture Results:   Few Yeast like cells  No enteric pathogens to date: Final culture pending  No enteric gram negative rods isolated      Culture - Blood (09.10.18 @ 23:03)    Specimen Source: .Blood Blood-Peripheral    Culture Results:   No growth to date.    Culture - Blood (09.10.18 @ 23:02)    Specimen Source: .Blood Blood-Peripheral    Culture Results:   No growth to date.      Culture - Urine (09.09.18 @ 21:33)    Specimen Source: .Urine Clean Catch (Midstream)    Culture Results:   <10,000 CFU/ml Normal Urogenital zehra present      RADIOLOGY:    EXAM:  MR MRCP WAW IC                            PROCEDURE DATE:  09/12/2018          INTERPRETATION:  MR of the abdomen without and with IV contrast including   MRCP    Indication: Dilated common bile duct.    Technique: Utilizing a 1.5 Jennifer high-field magnet, multiplanar   multisequence MR images of the abdomen are acquired without and with IV   contrast (6 cc Gadavist ministered, 4 cc discarded), supplemented by thin   and thick slab MRCP images. Postcontrast images are acquired dynamically.    Comparison: No prior abdominal MR is available for comparison. Reference   is made with a previous abdominal CT dated 9/9/2018.    Findings: The gallbladder is contracted. Small layering sludge in the   gallbladder without evidence for gallstone.No grossly thickened   gallbladder wall or pericholecystic fluid.    The common bile duct measures up to 6 mm in caliber which is within   normal limits. No suspicious filling defect in the common bile duct to   suggest choledocholithiasis. The main pancreatic duct is not dilated.     A few small cysts in the liver measuring up to 1.0 cm. The pancreas,   spleen, and adrenals appear unremarkable.    A few small cysts in the kidneys bilaterally measuring up to 1.7 cm in   the lower left kidney. This largest cyst has internal debris. In the   upper left kidney, there is a small area of striated hypoenhancement   which also was seen on the recent abdominal CT dated 9/9/2018, suggestive   of pyelonephritis. There are small nonenhancing foci withinthis area of   striated hypoenhancement in the upper left kidney measuring up to 8 mm,   suggestive of associated microabscesses.    Small ascites. No evidence for enlarged lymph node.    There is apparent mural thickening of the duodenum, suggestiveof   nonspecific duodenitis or peptic ulcer disease. This was seen on the   recent CT.    Impression: The common bile duct measures up to 6 mm in caliber which is   within normal limits. No suspicious filling defect in the common bile   duct to suggest choledocholithiasis. The main pancreatic duct is not   dilated.     In the upper left kidney, there is a small area of striated   hypoenhancement, suggestive of pyelonephritis. Small nonenhancing foci   within this area of striated hypoenhancement in the upper left kidney   measuring up to 8 mm, suggestive of associated microabscesses. Follow-up   abdominal MR in 4-6 weeks may be pursued to ensure resolution or   improvement, and to rule out malignant neoplastic process.    Other findings as above.            EXAM:  US KIDNEY(S)                            PROCEDURE DATE:  09/11/2018          INTERPRETATION:  CLINICAL STATEMENT: [nephritis]    TECHNIQUE: Ultrasound of the kidneys.    COMPARISON: CT abdomen pelvis 9/9/2018    FINDINGS:  The right kidney measures 11.2 cm in length. .    The left kidney measures 11.3 cm in length. .    There is no hydronephrosis.    Multiple cysts are noted in the left kidney. Nonspecific small echogenic   foci noted within the left kidney    IMPRESSION:  No hydronephrosis.           CXR:    EXAM:  XR CHEST AP OR PA 1V                            PROCEDURE DATE:  09/09/2018          INTERPRETATION:  History: Upper abdominal pain    Technique:  AP portable    Comparisons:  none    Findings:     Subsegmental atelectasis and volume loss inleft lower   lobe.  . Right lung is clear. No pleural effusions.    The pulmonary   vasculature and aorta are normal for age. Heart size is unremarkable.     The thorax is normal for age.    Impression: Subsegmental atelectasis left lower lobe. Mild relative   elevation of left diaphragm          EXAM:  CT ABDOMEN AND PELVIS OC IC                            PROCEDURE DATE:  09/09/2018          INTERPRETATION:  Abdominal/Pelvic CT    9/9/2018 10:28 PM    Indication: Abdominal pain and nausea, fever and diarrhea for the past 4   days    Technique: Axial images were obtained following oral and IV contrast from   the lung bases through pubic symphysis.       Reformatted coronal and   sagittal images are submitted.    Comparison: None    FINDINGS:    LUNG BASES:  There are no pleural effusions. Cystic bronchiectasis at the   left lung base  PERITONEUM:  There is no free air or focal collection.  No free fluid.  LIVER: At the dome, there is a small lobulated lesion measuring 1.2 cm   with nodular hyperenhancement probably representing hemangioma.   Subcentimeter lucencies too small to characterize..  SPLEEN: Normal.  GALLBLADDER: Contracted.  BILIARY TREE: Unremarkable.  PANCREAS: Normal.  ADRENAL GLANDS: Normal.  KIDNEYS: The left kidney demonstrates striated areas of hypoenhancement  concerning for pyelonephritis. There is a 2 mm stone in the expected   region of the left distal ureter. There are ill-defined lucencies in the   left upper kidney which probably represent microabscesses. There are   scattered cysts in the left lower kidney subcentimeter lucency in the   right kidney too small to characterize.  BOWEL: The stomach is incompletely distended.  Oral contrast is seen as   distally as rectum. There is marked thickening of the duodenum suggesting   duodenitis/peptic ulcer disease. There is no small bowel obstruction or   diverticulitis. The appendix is unremarkable. There is mild thickening of   the transverse and left colon suggesting colitis. Scattered   diverticulosis.    URINARY BLADDER: Collapsed.  PELVIC ORGANS: No pelvic masses.    There is no significant adenopathy. There is diffuse mesenteric edema.  VASCULATURE: The aorta is not dilated. There is mild atherosclerotic   vascular calcification.  RETROPERITONEUM:  There is no mass.  BONES: Unremarkable.  ABDOMINAL WALL: Unremarkable.    IMPRESSION:    Left-sided pyelonephritis with small microabscesses. Mild left renal   pelvic fullness without hydroureter. A 2 mm stone in the course of the   left distal ureter may represent an obstructing stone as opposed to   phlebolith; difficult to assess without ureteral distention or previous   study for comparison. Duodenitis.  Mild colitis.    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 She had lower abdominal pain on thursday morning which she initially thought that is from holding urine for long time or form constipation but it persisted through out day so she went to PCP next day who gave her Cipro and fleet enema for constipation.  Ct abdomen suggested left pyelonephritis and mild colitis.  Patient was started on IV Rocephin and Flagyl.  9/12/18 Patient feels better and is scheduled for cystoscopy, but was cancelled.  9/14/18 WBC mildly increased.    Problem/Recommendation - 1:  Problem: Pyelonephritis.   Recommendation:   1- UA & CS suggest contamination.  2- Follow Blood cultures to final reports.  3- Renal and bladder sonogram result is noted.  4- Continue Rocephin 1 gm IVPB q day.  5- IV hydration.  6- CBC and BMP follow up.  7- Continue IV Flagyl for colitis.  8- Urology follow up.  9- Follow-up abdominal MRI in 4-6 weeks as per radiology.    Problem/Recommendation - 2:  ·  Problem: Colitis.    Recommendation:   1- IV hydration.  2- stool for ova and parasites.  3- Stool for culture is noted.  4- stool for CDT was -VE.  5- CBC & BMP follow up.   6- ABX as per problem #1.     Problem/Recommendation - 3:  ·  Problem: COPD (chronic obstructive pulmonary disease).    Recommendation:   1- Bronchodilators.  2- O2 as needed.  3- Pulmonary management, and follow up as needed.    Problem/Recommendation - 4:  ·  Problem: Anemia.    Recommendation:   1-  Closely monitor H & H.  2- Observe for bleeding.     Discussed with medical resident and patient.

## 2018-09-15 LAB
ANION GAP SERPL CALC-SCNC: 9 MMOL/L — SIGNIFICANT CHANGE UP (ref 5–17)
BUN SERPL-MCNC: 6 MG/DL — LOW (ref 7–18)
CALCIUM SERPL-MCNC: 8 MG/DL — LOW (ref 8.4–10.5)
CHLORIDE SERPL-SCNC: 108 MMOL/L — SIGNIFICANT CHANGE UP (ref 96–108)
CO2 SERPL-SCNC: 25 MMOL/L — SIGNIFICANT CHANGE UP (ref 22–31)
CREAT SERPL-MCNC: 0.83 MG/DL — SIGNIFICANT CHANGE UP (ref 0.5–1.3)
CULTURE RESULTS: SIGNIFICANT CHANGE UP
GLUCOSE SERPL-MCNC: 116 MG/DL — HIGH (ref 70–99)
HCT VFR BLD CALC: 28 % — LOW (ref 34.5–45)
HGB BLD-MCNC: 9.1 G/DL — LOW (ref 11.5–15.5)
MAGNESIUM SERPL-MCNC: 1.6 MG/DL — SIGNIFICANT CHANGE UP (ref 1.6–2.6)
MCHC RBC-ENTMCNC: 28.8 PG — SIGNIFICANT CHANGE UP (ref 27–34)
MCHC RBC-ENTMCNC: 32.3 GM/DL — SIGNIFICANT CHANGE UP (ref 32–36)
MCV RBC AUTO: 89.2 FL — SIGNIFICANT CHANGE UP (ref 80–100)
PHOSPHATE SERPL-MCNC: 2.4 MG/DL — LOW (ref 2.5–4.5)
PLATELET # BLD AUTO: 434 K/UL — HIGH (ref 150–400)
POTASSIUM SERPL-MCNC: 3.6 MMOL/L — SIGNIFICANT CHANGE UP (ref 3.5–5.3)
POTASSIUM SERPL-SCNC: 3.6 MMOL/L — SIGNIFICANT CHANGE UP (ref 3.5–5.3)
RBC # BLD: 3.14 M/UL — LOW (ref 3.8–5.2)
RBC # FLD: 14.2 % — SIGNIFICANT CHANGE UP (ref 10.3–14.5)
SODIUM SERPL-SCNC: 142 MMOL/L — SIGNIFICANT CHANGE UP (ref 135–145)
SPECIMEN SOURCE: SIGNIFICANT CHANGE UP
WBC # BLD: 12 K/UL — HIGH (ref 3.8–10.5)
WBC # FLD AUTO: 12 K/UL — HIGH (ref 3.8–10.5)

## 2018-09-15 RX ADMIN — Medication 100 MILLIGRAM(S): at 21:43

## 2018-09-15 RX ADMIN — CEFTRIAXONE 100 GRAM(S): 500 INJECTION, POWDER, FOR SOLUTION INTRAMUSCULAR; INTRAVENOUS at 06:10

## 2018-09-15 RX ADMIN — ZOLPIDEM TARTRATE 5 MILLIGRAM(S): 10 TABLET ORAL at 22:46

## 2018-09-15 RX ADMIN — TAMSULOSIN HYDROCHLORIDE 0.4 MILLIGRAM(S): 0.4 CAPSULE ORAL at 21:43

## 2018-09-15 RX ADMIN — Medication 325 MILLIGRAM(S): at 11:09

## 2018-09-15 RX ADMIN — PANTOPRAZOLE SODIUM 40 MILLIGRAM(S): 20 TABLET, DELAYED RELEASE ORAL at 06:11

## 2018-09-15 RX ADMIN — Medication 100 MILLIGRAM(S): at 15:30

## 2018-09-15 RX ADMIN — Medication 100 MILLIGRAM(S): at 06:10

## 2018-09-15 RX ADMIN — BUDESONIDE AND FORMOTEROL FUMARATE DIHYDRATE 2 PUFF(S): 160; 4.5 AEROSOL RESPIRATORY (INHALATION) at 21:57

## 2018-09-15 RX ADMIN — BUDESONIDE AND FORMOTEROL FUMARATE DIHYDRATE 2 PUFF(S): 160; 4.5 AEROSOL RESPIRATORY (INHALATION) at 11:08

## 2018-09-15 NOTE — PROGRESS NOTE ADULT - PROBLEM SELECTOR PLAN 5
Has left ureteral stone and microabscesses  may need stent placement  Urology follow up  cont flomax  Renal US results noted. Has left ureteral stone and microabscesses  Urology follow up  cont flomax  Renal US results noted.

## 2018-09-15 NOTE — PROGRESS NOTE ADULT - SUBJECTIVE AND OBJECTIVE BOX
Patient is a 63y old  Female who presents with a chief complaint of lower abdominal pain for 4 days (14 Sep 2018 16:14)    pt seen in icu [  ], reg med floor [   ], bed [  ], chair at bedside [   ], a+o x3 [  ], lethargic [  ],  nad [  ]    hendrickson [  ], ngt [  ], peg [  ], et tube [  ], cent line [  ], picc line [  ]        Allergies    Motrin (Swelling)        Vitals    T(F): 97.5 (09-15-18 @ 05:56), Max: 99.3 (09-14-18 @ 20:54)  HR: 73 (09-15-18 @ 05:56) (73 - 87)  BP: 127/72 (09-15-18 @ 05:56) (125/63 - 149/76)  RR: 17 (09-15-18 @ 05:56) (16 - 17)  SpO2: 100% (09-15-18 @ 05:56) (97% - 100%)  Wt(kg): --  CAPILLARY BLOOD GLUCOSE          Labs                          9.1    12.0  )-----------( 434      ( 15 Sep 2018 06:55 )             28.0       09-15    142  |  108  |  6<L>  ----------------------------<  116<H>  3.6   |  25  |  0.83    Ca    8.0<L>      15 Sep 2018 06:55  Phos  2.4     09-15  Mg     1.6     09-15              .Stool Feces  09-12 @ 17:10   Testing in progress  --  --      .Stool Feces  09-11 @ 18:39   Few Yeast like cells  No enteric pathogens isolated.  (Stool culture examined for Salmonella,  Shigella, Campylobacter, Aeromonas, Plesiomonas,  Vibrio, E.coli O157 and Yersinia)  No enteric gram negative rods isolated  --  --      .Blood Blood-Peripheral  09-10 @ 23:03   No growth to date.  --  --      .Blood Blood-Peripheral  09-10 @ 23:02   No growth to date.  --  --      .Urine Clean Catch (Midstream)  09-09 @ 21:33   <10,000 CFU/ml Normal Urogenital zehra present  --  --          Radiology Results      Meds    MEDICATIONS  (STANDING):  ALBUTerol    90 MICROgram(s) HFA Inhaler 1 Puff(s) Inhalation every 6 hours  buDESOnide  80 MICROgram(s)/formoterol 4.5 MICROgram(s) Inhaler 2 Puff(s) Inhalation two times a day  cefTRIAXone   IVPB 1 Gram(s) IV Intermittent every 24 hours  dextrose 5% + sodium chloride 0.9%. 1000 milliLiter(s) (70 mL/Hr) IV Continuous <Continuous>  enoxaparin Injectable 40 milliGRAM(s) SubCutaneous daily  ergocalciferol 90105 Unit(s) Oral <User Schedule>  ferrous    sulfate 325 milliGRAM(s) Oral daily  melatonin 3 milliGRAM(s) Oral at bedtime  metroNIDAZOLE  IVPB 500 milliGRAM(s) IV Intermittent every 8 hours  pantoprazole    Tablet 40 milliGRAM(s) Oral before breakfast  sodium chloride 0.9% with potassium chloride 20 mEq/L 1000 milliLiter(s) (70 mL/Hr) IV Continuous <Continuous>  tamsulosin 0.4 milliGRAM(s) Oral at bedtime      MEDICATIONS  (PRN):  acetaminophen   Tablet .. 650 milliGRAM(s) Oral every 6 hours PRN Temp greater or equal to 38C (100.4F)  zolpidem 5 milliGRAM(s) Oral at bedtime PRN Insomnia      Physical Exam    Neuro :  no focal deficits  Respiratory: CTA B/L  CV: RRR, S1S2, no murmurs,   Abdominal: Soft, NT, ND +BS,  Extremities: No edema, + peripheral pulses    ASSESSMENT    Noninfectious gastroenteritis  COPD (chronic obstructive pulmonary disease)  S/P thyroid surgery  S/P RANDOLPH-BSO      PLAN Patient is a 63y old  Female who presents with a chief complaint of lower abdominal pain for 4 days (14 Sep 2018 16:14)    pt seen in icu [  ], reg med floor [ x  ], bed [x  ], chair at bedside [   ], a+o x3 [ x ], lethargic [  ],  nad [x ]    Allergies    Motrin (Swelling)        Vitals    T(F): 97.5 (09-15-18 @ 05:56), Max: 99.3 (09-14-18 @ 20:54)  HR: 73 (09-15-18 @ 05:56) (73 - 87)  BP: 127/72 (09-15-18 @ 05:56) (125/63 - 149/76)  RR: 17 (09-15-18 @ 05:56) (16 - 17)  SpO2: 100% (09-15-18 @ 05:56) (97% - 100%)  Wt(kg): --  CAPILLARY BLOOD GLUCOSE          Labs                          9.1    12.0  )-----------( 434      ( 15 Sep 2018 06:55 )             28.0       09-15    142  |  108  |  6<L>  ----------------------------<  116<H>  3.6   |  25  |  0.83    Ca    8.0<L>      15 Sep 2018 06:55  Phos  2.4     09-15  Mg     1.6     09-15              .Stool Feces  09-12 @ 17:10   Testing in progress  --  --      .Stool Feces  09-11 @ 18:39   Few Yeast like cells  No enteric pathogens isolated.  (Stool culture examined for Salmonella,  Shigella, Campylobacter, Aeromonas, Plesiomonas,  Vibrio, E.coli O157 and Yersinia)  No enteric gram negative rods isolated  --  --      .Blood Blood-Peripheral  09-10 @ 23:03   No growth to date.  --  --      .Blood Blood-Peripheral  09-10 @ 23:02   No growth to date.  --  --      .Urine Clean Catch (Midstream)  09-09 @ 21:33   <10,000 CFU/ml Normal Urogenital zehra present  --  --          Radiology Results      Meds    MEDICATIONS  (STANDING):  ALBUTerol    90 MICROgram(s) HFA Inhaler 1 Puff(s) Inhalation every 6 hours  buDESOnide  80 MICROgram(s)/formoterol 4.5 MICROgram(s) Inhaler 2 Puff(s) Inhalation two times a day  cefTRIAXone   IVPB 1 Gram(s) IV Intermittent every 24 hours  dextrose 5% + sodium chloride 0.9%. 1000 milliLiter(s) (70 mL/Hr) IV Continuous <Continuous>  enoxaparin Injectable 40 milliGRAM(s) SubCutaneous daily  ergocalciferol 96126 Unit(s) Oral <User Schedule>  ferrous    sulfate 325 milliGRAM(s) Oral daily  melatonin 3 milliGRAM(s) Oral at bedtime  metroNIDAZOLE  IVPB 500 milliGRAM(s) IV Intermittent every 8 hours  pantoprazole    Tablet 40 milliGRAM(s) Oral before breakfast  sodium chloride 0.9% with potassium chloride 20 mEq/L 1000 milliLiter(s) (70 mL/Hr) IV Continuous <Continuous>  tamsulosin 0.4 milliGRAM(s) Oral at bedtime      MEDICATIONS  (PRN):  acetaminophen   Tablet .. 650 milliGRAM(s) Oral every 6 hours PRN Temp greater or equal to 38C (100.4F)  zolpidem 5 milliGRAM(s) Oral at bedtime PRN Insomnia      Physical Exam      Neuro :  no focal deficits  Respiratory: CTA B/L  CV: RRR, S1S2, no murmurs,   Abdominal: Soft, NT, ND +BS,  Extremities: No edema, + peripheral pulses    ASSESSMENT    left pyelonephritis with micro abcesses,   left distal ureteral stone,   colitis,   duodenitis,   anemia,   h/o asthma  COPD (chronic obstructive pulmonary disease)  S/P thyroid surgery  S/P RANDOLPH-BSO      PLAN      urology f/u   no surgical intervention at this time  outpatient f/u with Dr. Jackson.  cont flomax  strain all urine indication of kidney stones  s/p MRCP noted above, striated hypoenhancement in the upper left kidney   measuring up to 8 mm, suggestive of associated microabscesses  Follow-up abd MRI in 4-6 weeks as per radiology  id f/u   cont Rocephin and flagyl  blood cx neg noted   u cx neg noted   renal bladder sono neg results noted. There is no hydronephrosis. Multiple cysts are noted in the left kidney.   Nonspecific small echogenic foci noted within the left kidney  gi f/u   EGD with esophagitis, gastritis, prepyloric ulcer, hiatal hernia  cont protonix  stool studies neg noted above  c diff negative noted   pulm f/u   adv diet as tolerated   cont current meds

## 2018-09-15 NOTE — PROGRESS NOTE ADULT - SUBJECTIVE AND OBJECTIVE BOX
Meds:  cefTRIAXone   IVPB 1 Gram(s) IV Intermittent every 24 hours  metroNIDAZOLE  IVPB 500 milliGRAM(s) IV Intermittent every 8 hours    Allergies:  Allergies    Motrin (Swelling)    Intolerances      ROS  [  ] UNABLE TO ELICIT    General:  [ x ] None  [  ] Fever  [  ] Chills  [  ] Malaise    Skin:  [ x ] None [  ] Rash  [  ] Wound  [  ] Ulcer    HEENT:  [ x ] None  [  ] Sore Throat  [  ] Nasal congestion/ runny nose  [  ] Photophobia [  ] Neck pain      Chest:  [ x ] None   [  ] SOB  [  ] Cough  [  ] None    Cardiovascular:   [ x ] None  [  ] CP  [  ] Palpitation    Gastrointestinal:  [ x ] None  [ x ] Abd pain(resolving)   [  ] Nausea    [  ] Vomiting   [  ] Diarrhea     Genitourinary:  [ x ] None [  ] Polyuria   [  ] Urgency  [  ] Frequency  [  ] Dysuria    [  ]  Hematuria       Musculoskeletal:  [ x ] None [  ] Back Pain	[  ] Body aches  [  ] Joint pain [  ] Weakness    Neurological: [ x ] None [  ]Dizziness  [  ]Visual Disturbance  [  ]Headaches   [  ] Weakness          PHYSICAL EXAM:  Vital Signs Last 24 Hrs  T(C): 36.4 (15 Sep 2018 05:56), Max: 37.4 (14 Sep 2018 20:54)  T(F): 97.5 (15 Sep 2018 05:56), Max: 99.3 (14 Sep 2018 20:54)  HR: 73 (15 Sep 2018 05:56) (73 - 87)  BP: 127/72 (15 Sep 2018 05:56) (125/63 - 149/76)  BP(mean): --  RR: 17 (15 Sep 2018 05:56) (16 - 17)  SpO2: 100% (15 Sep 2018 05:56) (97% - 100%)    Constitutional: feels better.    HEENT: [ x ] Wnl  [  ] Pharyngeal congestion    Neck:  [ x ] Supple  [  ]Lymphadenopathy  [ x ] No JVD   [  ] JVD  [  ] Masses   [  ] WNL    CHEST/Respiratory:  [ x ]Clear to auscultation  [  ] Rales   [  ] Rhonchi   [  ] Wheezing     [  ] Chest Tenderness      Cardiovascular:  [ x ] Reg S1 S2   [  ] Irreg S1 S2   [ x ]No Murmur  [  ] +ve Murmurs  [  ]Systolic [  ]Diastolic      Abdomen:  [ x ] Soft  [ x ] No tendrerness  [  ] Tenderness  [  ] Organomegaly  [  ] ABD Distention  [  ] Rigidity                       [ x ] No Regidity                       [ x ] No Rebound Tenderness  [ x ] No Guarding Rigidity  [  ] Rebound Tenderness[  ] Guarding Rigidity                          [ x ]  +ve Bowel Sounds  [  ] Decreased Bowel Sounds    [  ] Absent Bowel Sounds                            Extremities: [ x ] No edema [  ] Edema  [  ] Clubbing   [  ] Cyanosis                         [ x ] No Tender Calf muscles  [  ] Tender Calf muscles                        [ x ] Palpable peripheral pulses    Neurological: [ x ] Awake  [ x ] Alert  [ x ] Oriented  x  3                           [  ] Confused  [  ] Drowsy  [  ] respond to painful stimuli  [  ] Unresponsive    Skin:  [ x ] Intact [  ] Redness [  ] Thrombophlebitis  [  ] Rashes  [  ] Dry  [  ] Ulcers    Ortho:  [  ] Joint Swelling  [  ] Joint erythema [  ] Joint tenderness                [  ] Increased temp. to touch  [  ] DJD [ x ] WNL          LABS/DIAGNOSTIC TESTS                        9.1    12.0  )-----------( 434      ( 15 Sep 2018 06:55 )             28.0   09-15    142  |  108  |  6<L>  ----------------------------<  116<H>  3.6   |  25  |  0.83    Ca    8.0<L>      15 Sep 2018 06:55  Phos  2.4     09-15  Mg     1.6     09-15            C Diff by PCR Result: NotDetec (09.11.18 @ 17:48)          CULTURES:   Ova and Parasites (09.12.18 @ 17:10)    Culture Results:   Testing in progress    Culture - Stool (09.11.18 @ 18:39)    Specimen Source: .Stool Feces    Culture Results:   Few Yeast like cells  No enteric pathogens to date: Final culture pending  No enteric gram negative rods isolated      Culture - Blood (09.10.18 @ 23:03)    Specimen Source: .Blood Blood-Peripheral    Culture Results:   No growth to date.    Culture - Blood (09.10.18 @ 23:02)    Specimen Source: .Blood Blood-Peripheral    Culture Results:   No growth to date.      Culture - Urine (09.09.18 @ 21:33)    Specimen Source: .Urine Clean Catch (Midstream)    Culture Results:   <10,000 CFU/ml Normal Urogenital zehra present      RADIOLOGY:    EXAM:  MR MRCP WAW IC                            PROCEDURE DATE:  09/12/2018          INTERPRETATION:  MR of the abdomen without and with IV contrast including   MRCP    Indication: Dilated common bile duct.    Technique: Utilizing a 1.5 Jennifer high-field magnet, multiplanar   multisequence MR images of the abdomen are acquired without and with IV   contrast (6 cc Gadavist ministered, 4 cc discarded), supplemented by thin   and thick slab MRCP images. Postcontrast images are acquired dynamically.    Comparison: No prior abdominal MR is available for comparison. Reference   is made with a previous abdominal CT dated 9/9/2018.    Findings: The gallbladder is contracted. Small layering sludge in the   gallbladder without evidence for gallstone.No grossly thickened   gallbladder wall or pericholecystic fluid.    The common bile duct measures up to 6 mm in caliber which is within   normal limits. No suspicious filling defect in the common bile duct to   suggest choledocholithiasis. The main pancreatic duct is not dilated.     A few small cysts in the liver measuring up to 1.0 cm. The pancreas,   spleen, and adrenals appear unremarkable.    A few small cysts in the kidneys bilaterally measuring up to 1.7 cm in   the lower left kidney. This largest cyst has internal debris. In the   upper left kidney, there is a small area of striated hypoenhancement   which also was seen on the recent abdominal CT dated 9/9/2018, suggestive   of pyelonephritis. There are small nonenhancing foci withinthis area of   striated hypoenhancement in the upper left kidney measuring up to 8 mm,   suggestive of associated microabscesses.    Small ascites. No evidence for enlarged lymph node.    There is apparent mural thickening of the duodenum, suggestiveof   nonspecific duodenitis or peptic ulcer disease. This was seen on the   recent CT.    Impression: The common bile duct measures up to 6 mm in caliber which is   within normal limits. No suspicious filling defect in the common bile   duct to suggest choledocholithiasis. The main pancreatic duct is not   dilated.     In the upper left kidney, there is a small area of striated   hypoenhancement, suggestive of pyelonephritis. Small nonenhancing foci   within this area of striated hypoenhancement in the upper left kidney   measuring up to 8 mm, suggestive of associated microabscesses. Follow-up   abdominal MR in 4-6 weeks may be pursued to ensure resolution or   improvement, and to rule out malignant neoplastic process.    Other findings as above.            EXAM:  US KIDNEY(S)                            PROCEDURE DATE:  09/11/2018          INTERPRETATION:  CLINICAL STATEMENT: [nephritis]    TECHNIQUE: Ultrasound of the kidneys.    COMPARISON: CT abdomen pelvis 9/9/2018    FINDINGS:  The right kidney measures 11.2 cm in length. .    The left kidney measures 11.3 cm in length. .    There is no hydronephrosis.    Multiple cysts are noted in the left kidney. Nonspecific small echogenic   foci noted within the left kidney    IMPRESSION:  No hydronephrosis.           CXR:    EXAM:  XR CHEST AP OR PA 1V                            PROCEDURE DATE:  09/09/2018          INTERPRETATION:  History: Upper abdominal pain    Technique:  AP portable    Comparisons:  none    Findings:     Subsegmental atelectasis and volume loss inleft lower   lobe.  . Right lung is clear. No pleural effusions.    The pulmonary   vasculature and aorta are normal for age. Heart size is unremarkable.     The thorax is normal for age.    Impression: Subsegmental atelectasis left lower lobe. Mild relative   elevation of left diaphragm          EXAM:  CT ABDOMEN AND PELVIS OC IC                            PROCEDURE DATE:  09/09/2018          INTERPRETATION:  Abdominal/Pelvic CT    9/9/2018 10:28 PM    Indication: Abdominal pain and nausea, fever and diarrhea for the past 4   days    Technique: Axial images were obtained following oral and IV contrast from   the lung bases through pubic symphysis.       Reformatted coronal and   sagittal images are submitted.    Comparison: None    FINDINGS:    LUNG BASES:  There are no pleural effusions. Cystic bronchiectasis at the   left lung base  PERITONEUM:  There is no free air or focal collection.  No free fluid.  LIVER: At the dome, there is a small lobulated lesion measuring 1.2 cm   with nodular hyperenhancement probably representing hemangioma.   Subcentimeter lucencies too small to characterize..  SPLEEN: Normal.  GALLBLADDER: Contracted.  BILIARY TREE: Unremarkable.  PANCREAS: Normal.  ADRENAL GLANDS: Normal.  KIDNEYS: The left kidney demonstrates striated areas of hypoenhancement  concerning for pyelonephritis. There is a 2 mm stone in the expected   region of the left distal ureter. There are ill-defined lucencies in the   left upper kidney which probably represent microabscesses. There are   scattered cysts in the left lower kidney subcentimeter lucency in the   right kidney too small to characterize.  BOWEL: The stomach is incompletely distended.  Oral contrast is seen as   distally as rectum. There is marked thickening of the duodenum suggesting   duodenitis/peptic ulcer disease. There is no small bowel obstruction or   diverticulitis. The appendix is unremarkable. There is mild thickening of   the transverse and left colon suggesting colitis. Scattered   diverticulosis.    URINARY BLADDER: Collapsed.  PELVIC ORGANS: No pelvic masses.    There is no significant adenopathy. There is diffuse mesenteric edema.  VASCULATURE: The aorta is not dilated. There is mild atherosclerotic   vascular calcification.  RETROPERITONEUM:  There is no mass.  BONES: Unremarkable.  ABDOMINAL WALL: Unremarkable.    IMPRESSION:    Left-sided pyelonephritis with small microabscesses. Mild left renal   pelvic fullness without hydroureter. A 2 mm stone in the course of the   left distal ureter may represent an obstructing stone as opposed to   phlebolith; difficult to assess without ureteral distention or previous   study for comparison. Duodenitis.  Mild colitis.    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 She had lower abdominal pain on thursday morning which she initially thought that is from holding urine for long time or form constipation but it persisted through out day so she went to PCP next day who gave her Cipro and fleet enema for constipation.  Ct abdomen suggested left pyelonephritis and mild colitis.  Patient was started on IV Rocephin and Flagyl.  9/12/18 Patient feels better and is scheduled for cystoscopy, but was cancelled.  9/14/18 WBC mildly increased, patient had EGD and biopsy.  9/15/18 Patient feels much better and stated that diarrhea, and abdominal pain completely resolved, also WBC is improved but still elevated.  Problem/Recommendation - 1:  Problem: Pyelonephritis.   Recommendation:   1- UA & CS suggest contamination.  2- Follow Blood cultures to final reports.  3- Renal and bladder sonogram result is noted.  4- Continue Rocephin 1 gm IVPB q day.  5- IV hydration.  6- CBC and BMP follow up.  7- Continue IV Flagyl for colitis.  8- Urology follow up.  9- Follow-up abdominal MRI in 4-6 weeks as per radiology.  10- Follow EGD biopsy result.    Problem/Recommendation - 2:  ·  Problem: Colitis.    Recommendation:   1- IV hydration.  2- stool for ova and parasites(still in progress).  3- Stool for culture is noted.  4- stool for CDT was -VE.  5- CBC & BMP follow up.   6- ABX as per problem #1.     Problem/Recommendation - 3:  ·  Problem: COPD (chronic obstructive pulmonary disease).    Recommendation:   1- Bronchodilators.  2- O2 as needed.  3- Pulmonary management, and follow up as needed.    Problem/Recommendation - 4:  ·  Problem: Anemia.    Recommendation:   1-  Closely monitor H & H.  2- Observe for bleeding.     Discussed with medical resident and patient.

## 2018-09-15 NOTE — PROGRESS NOTE ADULT - SUBJECTIVE AND OBJECTIVE BOX
Pt with no complaints at this time. Eating breakfast with no nausea or vomiting. No abdominal pain for 2 days per pt. No history of LUTS or dysuria    Vital Signs Last 24 Hrs  T(C): 36.4 (15 Sep 2018 05:56), Max: 37.4 (14 Sep 2018 20:54)  T(F): 97.5 (15 Sep 2018 05:56), Max: 99.3 (14 Sep 2018 20:54)  HR: 73 (15 Sep 2018 05:56) (73 - 87)  BP: 127/72 (15 Sep 2018 05:56) (125/63 - 149/76)  BP(mean): --  RR: 17 (15 Sep 2018 05:56) (16 - 17)  SpO2: 100% (15 Sep 2018 05:56) (97% - 100%)                          9.1    12.0  )-----------( 434      ( 15 Sep 2018 06:55 )             28.0   09-15    142  |  108  |  6<L>  ----------------------------<  116<H>  3.6   |  25  |  0.83    Ca    8.0<L>      15 Sep 2018 06:55  Phos  2.4     09-15  Mg     1.6     09-15

## 2018-09-16 LAB
ANION GAP SERPL CALC-SCNC: 7 MMOL/L — SIGNIFICANT CHANGE UP (ref 5–17)
ANION GAP SERPL CALC-SCNC: 7 MMOL/L — SIGNIFICANT CHANGE UP (ref 5–17)
BUN SERPL-MCNC: 6 MG/DL — LOW (ref 7–18)
BUN SERPL-MCNC: 7 MG/DL — SIGNIFICANT CHANGE UP (ref 7–18)
CALCIUM SERPL-MCNC: 7.5 MG/DL — LOW (ref 8.4–10.5)
CALCIUM SERPL-MCNC: 7.7 MG/DL — LOW (ref 8.4–10.5)
CHLORIDE SERPL-SCNC: 106 MMOL/L — SIGNIFICANT CHANGE UP (ref 96–108)
CHLORIDE SERPL-SCNC: 108 MMOL/L — SIGNIFICANT CHANGE UP (ref 96–108)
CO2 SERPL-SCNC: 26 MMOL/L — SIGNIFICANT CHANGE UP (ref 22–31)
CO2 SERPL-SCNC: 26 MMOL/L — SIGNIFICANT CHANGE UP (ref 22–31)
CREAT SERPL-MCNC: 0.68 MG/DL — SIGNIFICANT CHANGE UP (ref 0.5–1.3)
CREAT SERPL-MCNC: 0.98 MG/DL — SIGNIFICANT CHANGE UP (ref 0.5–1.3)
CULTURE RESULTS: SIGNIFICANT CHANGE UP
CULTURE RESULTS: SIGNIFICANT CHANGE UP
GLUCOSE SERPL-MCNC: 102 MG/DL — HIGH (ref 70–99)
GLUCOSE SERPL-MCNC: 125 MG/DL — HIGH (ref 70–99)
HCT VFR BLD CALC: 27.7 % — LOW (ref 34.5–45)
HGB BLD-MCNC: 8.8 G/DL — LOW (ref 11.5–15.5)
MAGNESIUM SERPL-MCNC: 1.7 MG/DL — SIGNIFICANT CHANGE UP (ref 1.6–2.6)
MCHC RBC-ENTMCNC: 28.3 PG — SIGNIFICANT CHANGE UP (ref 27–34)
MCHC RBC-ENTMCNC: 31.8 GM/DL — LOW (ref 32–36)
MCV RBC AUTO: 88.9 FL — SIGNIFICANT CHANGE UP (ref 80–100)
PHOSPHATE SERPL-MCNC: 2.5 MG/DL — SIGNIFICANT CHANGE UP (ref 2.5–4.5)
PLATELET # BLD AUTO: 496 K/UL — HIGH (ref 150–400)
POTASSIUM SERPL-MCNC: 2.8 MMOL/L — CRITICAL LOW (ref 3.5–5.3)
POTASSIUM SERPL-MCNC: 3.4 MMOL/L — LOW (ref 3.5–5.3)
POTASSIUM SERPL-SCNC: 2.8 MMOL/L — CRITICAL LOW (ref 3.5–5.3)
POTASSIUM SERPL-SCNC: 3.4 MMOL/L — LOW (ref 3.5–5.3)
RBC # BLD: 3.11 M/UL — LOW (ref 3.8–5.2)
RBC # FLD: 13.8 % — SIGNIFICANT CHANGE UP (ref 10.3–14.5)
SODIUM SERPL-SCNC: 139 MMOL/L — SIGNIFICANT CHANGE UP (ref 135–145)
SODIUM SERPL-SCNC: 141 MMOL/L — SIGNIFICANT CHANGE UP (ref 135–145)
SPECIMEN SOURCE: SIGNIFICANT CHANGE UP
SPECIMEN SOURCE: SIGNIFICANT CHANGE UP
WBC # BLD: 11.7 K/UL — HIGH (ref 3.8–10.5)
WBC # FLD AUTO: 11.7 K/UL — HIGH (ref 3.8–10.5)

## 2018-09-16 RX ORDER — POTASSIUM CHLORIDE 20 MEQ
10 PACKET (EA) ORAL ONCE
Qty: 0 | Refills: 0 | Status: COMPLETED | OUTPATIENT
Start: 2018-09-16 | End: 2018-09-16

## 2018-09-16 RX ORDER — TAMSULOSIN HYDROCHLORIDE 0.4 MG/1
1 CAPSULE ORAL
Qty: 30 | Refills: 0 | OUTPATIENT
Start: 2018-09-16 | End: 2018-10-15

## 2018-09-16 RX ORDER — ALBUTEROL 90 UG/1
1 AEROSOL, METERED ORAL
Qty: 30 | Refills: 0 | OUTPATIENT
Start: 2018-09-16 | End: 2018-10-15

## 2018-09-16 RX ORDER — PANTOPRAZOLE SODIUM 20 MG/1
1 TABLET, DELAYED RELEASE ORAL
Qty: 30 | Refills: 0 | OUTPATIENT
Start: 2018-09-16 | End: 2018-10-15

## 2018-09-16 RX ORDER — POTASSIUM CHLORIDE 20 MEQ
40 PACKET (EA) ORAL ONCE
Qty: 0 | Refills: 0 | Status: COMPLETED | OUTPATIENT
Start: 2018-09-16 | End: 2018-09-16

## 2018-09-16 RX ORDER — FERROUS SULFATE 325(65) MG
1 TABLET ORAL
Qty: 30 | Refills: 0 | OUTPATIENT
Start: 2018-09-16 | End: 2018-10-15

## 2018-09-16 RX ORDER — ERGOCALCIFEROL 1.25 MG/1
1 CAPSULE ORAL
Qty: 30 | Refills: 0 | OUTPATIENT
Start: 2018-09-16 | End: 2018-10-15

## 2018-09-16 RX ORDER — METRONIDAZOLE 500 MG
500 TABLET ORAL
Qty: 18 | Refills: 0 | OUTPATIENT
Start: 2018-09-16 | End: 2018-09-21

## 2018-09-16 RX ADMIN — Medication 100 MILLIGRAM(S): at 05:12

## 2018-09-16 RX ADMIN — Medication 40 MILLIEQUIVALENT(S): at 11:33

## 2018-09-16 RX ADMIN — Medication 100 MILLIEQUIVALENT(S): at 11:33

## 2018-09-16 RX ADMIN — TAMSULOSIN HYDROCHLORIDE 0.4 MILLIGRAM(S): 0.4 CAPSULE ORAL at 21:42

## 2018-09-16 RX ADMIN — PANTOPRAZOLE SODIUM 40 MILLIGRAM(S): 20 TABLET, DELAYED RELEASE ORAL at 05:12

## 2018-09-16 RX ADMIN — Medication 100 MILLIGRAM(S): at 13:38

## 2018-09-16 RX ADMIN — ZOLPIDEM TARTRATE 5 MILLIGRAM(S): 10 TABLET ORAL at 21:42

## 2018-09-16 RX ADMIN — BUDESONIDE AND FORMOTEROL FUMARATE DIHYDRATE 2 PUFF(S): 160; 4.5 AEROSOL RESPIRATORY (INHALATION) at 11:34

## 2018-09-16 RX ADMIN — Medication 40 MILLIEQUIVALENT(S): at 19:02

## 2018-09-16 RX ADMIN — BUDESONIDE AND FORMOTEROL FUMARATE DIHYDRATE 2 PUFF(S): 160; 4.5 AEROSOL RESPIRATORY (INHALATION) at 21:42

## 2018-09-16 RX ADMIN — Medication 325 MILLIGRAM(S): at 11:33

## 2018-09-16 RX ADMIN — CEFTRIAXONE 100 GRAM(S): 500 INJECTION, POWDER, FOR SOLUTION INTRAMUSCULAR; INTRAVENOUS at 05:11

## 2018-09-16 RX ADMIN — ENOXAPARIN SODIUM 40 MILLIGRAM(S): 100 INJECTION SUBCUTANEOUS at 11:34

## 2018-09-16 RX ADMIN — Medication 100 MILLIGRAM(S): at 21:42

## 2018-09-16 NOTE — PROGRESS NOTE ADULT - PROBLEM SELECTOR PLAN 1
presented with back pain, abdominal pain, nausea  reports that she frequently holds urine  continue ceftin and flagyl till 9/22.  .

## 2018-09-16 NOTE — PROGRESS NOTE ADULT - SUBJECTIVE AND OBJECTIVE BOX
[   ] ICU                                          [   ] CCU                                      [ X  ] Medical Floor    Patient is comfortable. No new complaints reported, No abdominal pain, N/V, hematemesis, hematochezia, melena, fever, chills, chest pain, SOB, cough or diarrhea reported.    VITALS  Vital Signs Last 24 Hrs  T(C): 36.9 (16 Sep 2018 04:46), Max: 36.9 (16 Sep 2018 04:46)  T(F): 98.4 (16 Sep 2018 04:46), Max: 98.4 (16 Sep 2018 04:46)  HR: 67 (16 Sep 2018 04:46) (67 - 87)  BP: 139/70 (16 Sep 2018 04:46) (110/60 - 159/75)   RR: 16 (16 Sep 2018 04:46) (15 - 16)  SpO2: 98% (16 Sep 2018 04:46) (98% - 100%)         MEDICATIONS  (STANDING):  ALBUTerol    90 MICROgram(s) HFA Inhaler 1 Puff(s) Inhalation every 6 hours  buDESOnide  80 MICROgram(s)/formoterol 4.5 MICROgram(s) Inhaler 2 Puff(s) Inhalation two times a day  cefTRIAXone   IVPB 1 Gram(s) IV Intermittent every 24 hours  dextrose 5% + sodium chloride 0.9%. 1000 milliLiter(s) (70 mL/Hr) IV Continuous <Continuous>  enoxaparin Injectable 40 milliGRAM(s) SubCutaneous daily  ergocalciferol 21069 Unit(s) Oral <User Schedule>  ferrous    sulfate 325 milliGRAM(s) Oral daily  melatonin 3 milliGRAM(s) Oral at bedtime  metroNIDAZOLE  IVPB 500 milliGRAM(s) IV Intermittent every 8 hours  pantoprazole    Tablet 40 milliGRAM(s) Oral before breakfast  sodium chloride 0.9% with potassium chloride 20 mEq/L 1000 milliLiter(s) (70 mL/Hr) IV Continuous <Continuous>  tamsulosin 0.4 milliGRAM(s) Oral at bedtime    MEDICATIONS  (PRN):  acetaminophen   Tablet .. 650 milliGRAM(s) Oral every 6 hours PRN Temp greater or equal to 38C (100.4F)  zolpidem 5 milliGRAM(s) Oral at bedtime PRN Insomnia                            8.8    11.7  )-----------( 496      ( 16 Sep 2018 07:30 )             27.7       09-16    139  |  106  |  6<L>  ----------------------------<  102<H>  2.8<LL>   |  26  |  0.68    Ca    7.7<L>      16 Sep 2018 07:30  Phos  2.5     09-16  Mg     1.7     09-16

## 2018-09-16 NOTE — PROGRESS NOTE ADULT - PROBLEM SELECTOR PLAN 6
CT scan showed 2mm left ureteral stone and microabscesses  may need stent placement  continue with tamsulosin  F/u with Dr Jackson as outpatient  Out-patient follow up with Dr Jackson  Urology was consulted and recommended conservative management but not cystoscopy with stent placement.

## 2018-09-16 NOTE — PROGRESS NOTE ADULT - SUBJECTIVE AND OBJECTIVE BOX
PGY 1 Note discussed with supervising resident and primary attending    Patient is a 63y old  Female who presents with a chief complaint of lower abdominal pain for 4 days (16 Sep 2018 12:51)      INTERVAL HPI/OVERNIGHT EVENTS: no events noted overnight.    MEDICATIONS  (STANDING):  ALBUTerol    90 MICROgram(s) HFA Inhaler 1 Puff(s) Inhalation every 6 hours  buDESOnide  80 MICROgram(s)/formoterol 4.5 MICROgram(s) Inhaler 2 Puff(s) Inhalation two times a day  cefTRIAXone   IVPB 1 Gram(s) IV Intermittent every 24 hours  dextrose 5% + sodium chloride 0.9%. 1000 milliLiter(s) (70 mL/Hr) IV Continuous <Continuous>  enoxaparin Injectable 40 milliGRAM(s) SubCutaneous daily  ergocalciferol 44348 Unit(s) Oral <User Schedule>  ferrous    sulfate 325 milliGRAM(s) Oral daily  melatonin 3 milliGRAM(s) Oral at bedtime  metroNIDAZOLE  IVPB 500 milliGRAM(s) IV Intermittent every 8 hours  pantoprazole    Tablet 40 milliGRAM(s) Oral before breakfast  sodium chloride 0.9% with potassium chloride 20 mEq/L 1000 milliLiter(s) (70 mL/Hr) IV Continuous <Continuous>  tamsulosin 0.4 milliGRAM(s) Oral at bedtime    MEDICATIONS  (PRN):  acetaminophen   Tablet .. 650 milliGRAM(s) Oral every 6 hours PRN Temp greater or equal to 38C (100.4F)  zolpidem 5 milliGRAM(s) Oral at bedtime PRN Insomnia      __________________________________________________  REVIEW OF SYSTEMS:    CONSTITUTIONAL: No fever,   EYES: no acute visual disturbances  NECK: No pain or stiffness  RESPIRATORY: No cough; No shortness of breath  CARDIOVASCULAR: No chest pain, no palpitations  GASTROINTESTINAL: No pain. No nausea or vomiting; No diarrhea   NEUROLOGICAL: No headache or numbness, no tremors  MUSCULOSKELETAL: No joint pain, no muscle pain  GENITOURINARY: no dysuria, no frequency, no hesitancy  PSYCHIATRY: no depression , no anxiety  ALL OTHER  ROS negative        Vital Signs Last 24 Hrs  T(C): 36.7 (16 Sep 2018 14:13), Max: 36.9 (16 Sep 2018 04:46)  T(F): 98 (16 Sep 2018 14:13), Max: 98.4 (16 Sep 2018 04:46)  HR: 81 (16 Sep 2018 14:13) (67 - 81)  BP: 113/61 (16 Sep 2018 14:13) (113/61 - 139/70)  BP(mean): --  RR: 16 (16 Sep 2018 14:13) (16 - 16)  SpO2: 98% (16 Sep 2018 14:13) (98% - 98%)    ________________________________________________  PHYSICAL EXAM:  GENERAL: NAD  HEENT: Normocephalic;  conjunctivae and sclerae clear; moist mucous membranes;   NECK : supple  CHEST/LUNG: Clear to auscultation bilaterally with good air entry   HEART: S1 S2  regular; no murmurs, gallops or rubs  ABDOMEN: Soft, Nontender, Nondistended; Bowel sounds present  EXTREMITIES: no cyanosis; no edema; no calf tenderness  SKIN: warm and dry; no rash  NERVOUS SYSTEM:  Awake and alert; Oriented  to place, person and time ; no new deficits    _________________________________________________  LABS:                        8.8    11.7  )-----------( 496      ( 16 Sep 2018 07:30 )             27.7     09-16    141  |  108  |  7   ----------------------------<  125<H>  3.4<L>   |  26  |  0.98    Ca    7.5<L>      16 Sep 2018 17:27  Phos  2.5     09-16  Mg     1.7     09-16          CAPILLARY BLOOD GLUCOSE            RADIOLOGY & ADDITIONAL TESTS:    Imaging Personally Reviewed:  YES    Consultant(s) Notes Reviewed:   YES    Care Discussed with Consultants : YES     Plan of care was discussed with patient and /or primary care giver; all questions and concerns were addressed and care was aligned with patient's wishes.

## 2018-09-16 NOTE — PROGRESS NOTE ADULT - SUBJECTIVE AND OBJECTIVE BOX
Patient is a 63y old  Female who presents with a chief complaint of lower abdominal pain for 4 days (16 Sep 2018 01:13)    pt seen in icu [  ], reg med floor [ x  ], bed [x  ], chair at bedside [   ], a+o x3 [ x ], lethargic [  ],  nad [x ]      Allergies    Motrin (Swelling)        Vitals    T(F): 98.4 (09-16-18 @ 04:46), Max: 98.4 (09-16-18 @ 04:46)  HR: 67 (09-16-18 @ 04:46) (67 - 87)  BP: 139/70 (09-16-18 @ 04:46) (110/60 - 159/75)  RR: 16 (09-16-18 @ 04:46) (15 - 16)  SpO2: 98% (09-16-18 @ 04:46) (98% - 100%)  Wt(kg): --  CAPILLARY BLOOD GLUCOSE          Labs                          9.1    12.0  )-----------( 434      ( 15 Sep 2018 06:55 )             28.0       09-15    142  |  108  |  6<L>  ----------------------------<  116<H>  3.6   |  25  |  0.83    Ca    8.0<L>      15 Sep 2018 06:55  Phos  2.4     09-15  Mg     1.6     09-15              .Stool Feces  09-12 @ 17:10   No Protozoa seen by trichrome stain  No Helminths or Protozoa seen in formalin concentrate  performed by iodine stain  (routine O+P not evaluated for Microsporidia,  Cryptosporidia, Cyclospora, or Isospora.)  Note: One negative specimen does not rule  out the possibility of a parasitic infection.  --  --      .Stool Feces  09-11 @ 18:39   Few Yeast like cells  No enteric pathogens isolated.  (Stool culture examined for Salmonella,  Shigella, Campylobacter, Aeromonas, Plesiomonas,  Vibrio, E.coli O157 and Yersinia)  No enteric gram negative rods isolated  --  --      .Blood Blood-Peripheral  09-10 @ 23:03   No growth at 5 days.  --  --      .Blood Blood-Peripheral  09-10 @ 23:02   No growth at 5 days.  --  --      .Urine Clean Catch (Midstream)  09-09 @ 21:33   <10,000 CFU/ml Normal Urogenital zehra present  --  --          Radiology Results      Meds    MEDICATIONS  (STANDING):  ALBUTerol    90 MICROgram(s) HFA Inhaler 1 Puff(s) Inhalation every 6 hours  buDESOnide  80 MICROgram(s)/formoterol 4.5 MICROgram(s) Inhaler 2 Puff(s) Inhalation two times a day  cefTRIAXone   IVPB 1 Gram(s) IV Intermittent every 24 hours  dextrose 5% + sodium chloride 0.9%. 1000 milliLiter(s) (70 mL/Hr) IV Continuous <Continuous>  enoxaparin Injectable 40 milliGRAM(s) SubCutaneous daily  ergocalciferol 30557 Unit(s) Oral <User Schedule>  ferrous    sulfate 325 milliGRAM(s) Oral daily  melatonin 3 milliGRAM(s) Oral at bedtime  metroNIDAZOLE  IVPB 500 milliGRAM(s) IV Intermittent every 8 hours  pantoprazole    Tablet 40 milliGRAM(s) Oral before breakfast  sodium chloride 0.9% with potassium chloride 20 mEq/L 1000 milliLiter(s) (70 mL/Hr) IV Continuous <Continuous>  tamsulosin 0.4 milliGRAM(s) Oral at bedtime      MEDICATIONS  (PRN):  acetaminophen   Tablet .. 650 milliGRAM(s) Oral every 6 hours PRN Temp greater or equal to 38C (100.4F)  zolpidem 5 milliGRAM(s) Oral at bedtime PRN Insomnia      Physical Exam    Neuro :  no focal deficits  Respiratory: CTA B/L  CV: RRR, S1S2, no murmurs,   Abdominal: Soft, NT, ND +BS,  Extremities: No edema, + peripheral pulses    ASSESSMENT    left pyelonephritis with micro abcesses,   left distal ureteral stone,   colitis,   duodenitis,   anemia,   h/o asthma  COPD (chronic obstructive pulmonary disease)  S/P thyroid surgery  S/P RANDOLPH-BSO      PLAN      urology f/u   no surgical intervention at this time  outpatient f/u with Dr. Jackson.  cont flomax  strain all urine indication of kidney stones  s/p MRCP noted above, striated hypoenhancement in the upper left kidney   measuring up to 8 mm, suggestive of associated microabscesses  Follow-up abd MRI in 4-6 weeks as per radiology  id f/u noted  f/u cbc  d/c Rocephin when wbc wnl and start ceftin 500mg q12 to cont until 09/22/18   cont flagyl po until 09/22/18  blood cx neg noted   u cx neg noted   renal bladder sono neg results noted. There is no hydronephrosis. Multiple cysts are noted in the left kidney.   Nonspecific small echogenic foci noted within the left kidney  gi f/u   EGD with esophagitis, gastritis, prepyloric ulcer, hiatal hernia  cont protonix  stool studies neg noted above  c diff negative noted   pulm f/u   tolerating diet  cont current meds  d/c plan when wbc wnl

## 2018-09-16 NOTE — PROGRESS NOTE ADULT - SUBJECTIVE AND OBJECTIVE BOX
Meds:  cefTRIAXone   IVPB 1 Gram(s) IV Intermittent every 24 hours  metroNIDAZOLE  IVPB 500 milliGRAM(s) IV Intermittent every 8 hours    Allergies:  Allergies    Motrin (Swelling)    Intolerances      ROS  [  ] UNABLE TO ELICIT    General:  [ x ] None  [  ] Fever  [  ] Chills  [  ] Malaise    Skin:  [ x ] None [  ] Rash  [  ] Wound  [  ] Ulcer    HEENT:  [ x ] None  [  ] Sore Throat  [  ] Nasal congestion/ runny nose  [  ] Photophobia [  ] Neck pain      Chest:  [ x ] None   [  ] SOB  [  ] Cough  [  ] None    Cardiovascular:   [ x ] None  [  ] CP  [  ] Palpitation    Gastrointestinal:  [ x ] None  [ x ] Abd pain(resolving)   [  ] Nausea    [  ] Vomiting   [  ] Diarrhea     Genitourinary:  [ x ] None [  ] Polyuria   [  ] Urgency  [  ] Frequency  [  ] Dysuria    [  ]  Hematuria       Musculoskeletal:  [ x ] None [  ] Back Pain	[  ] Body aches  [  ] Joint pain [  ] Weakness    Neurological: [ x ] None [  ]Dizziness  [  ]Visual Disturbance  [  ]Headaches   [  ] Weakness      PHYSICAL EXAM:  -------------------  Vital Signs Last 24 Hrs  T(C): 36.3 (15 Sep 2018 20:44), Max: 36.7 (15 Sep 2018 14:19)  T(F): 97.4 (15 Sep 2018 20:44), Max: 98.1 (15 Sep 2018 14:19)  HR: 87 (15 Sep 2018 20:44) (73 - 87)  BP: 159/75 (15 Sep 2018 20:44) (110/60 - 159/75)  BP(mean): --  RR: 16 (15 Sep 2018 20:44) (15 - 17)  SpO2: 98% (15 Sep 2018 20:44) (98% - 100%)    Constitutional: feels better.    HEENT: [ x ] Wnl  [  ] Pharyngeal congestion    Neck:  [ x ] Supple  [  ]Lymphadenopathy  [ x ] No JVD   [  ] JVD  [  ] Masses   [  ] WNL    CHEST/Respiratory:  [ x ]Clear to auscultation  [  ] Rales   [  ] Rhonchi   [  ] Wheezing     [  ] Chest Tenderness      Cardiovascular:  [ x ] Reg S1 S2   [  ] Irreg S1 S2   [ x ]No Murmur  [  ] +ve Murmurs  [  ]Systolic [  ]Diastolic      Abdomen:  [ x ] Soft  [ x ] No tendrerness  [  ] Tenderness  [  ] Organomegaly  [  ] ABD Distention  [  ] Rigidity                       [ x ] No Regidity                       [ x ] No Rebound Tenderness  [ x ] No Guarding Rigidity  [  ] Rebound Tenderness[  ] Guarding Rigidity                          [ x ]  +ve Bowel Sounds  [  ] Decreased Bowel Sounds    [  ] Absent Bowel Sounds                            Extremities: [ x ] No edema [  ] Edema  [  ] Clubbing   [  ] Cyanosis                         [ x ] No Tender Calf muscles  [  ] Tender Calf muscles                        [ x ] Palpable peripheral pulses    Neurological: [ x ] Awake  [ x ] Alert  [ x ] Oriented  x  3                           [  ] Confused  [  ] Drowsy  [  ] respond to painful stimuli  [  ] Unresponsive    Skin:  [ x ] Intact [  ] Redness [  ] Thrombophlebitis  [  ] Rashes  [  ] Dry  [  ] Ulcers    Ortho:  [  ] Joint Swelling  [  ] Joint erythema [  ] Joint tenderness                [  ] Increased temp. to touch  [  ] DJD [ x ] WNL          LABS/DIAGNOSTIC TESTS                        9.1    12.0  )-----------( 434      ( 15 Sep 2018 06:55 )             28.0   09-15    142  |  108  |  6<L>  ----------------------------<  116<H>  3.6   |  25  |  0.83    Ca    8.0<L>      15 Sep 2018 06:55  Phos  2.4     09-15  Mg     1.6     09-15            C Diff by PCR Result: NotDetec (09.11.18 @ 17:48)          CULTURES:   Ova and Parasites (09.12.18 @ 17:10)    Culture Results:   No Protozoa seen by trichrome stain  No Helminths or Protozoa seen in formalin concentrate  performed by iodine stain  (routine O+P not evaluated for Microsporidia,  Cryptosporidia, Cyclospora, or Isospora.)  Note: One negative specimen does not rule  out the possibility of a parasitic infection.      Culture - Stool (09.11.18 @ 18:39)    Specimen Source: .Stool Feces    Culture Results:   Few Yeast like cells  No enteric pathogens to date: Final culture pending  No enteric gram negative rods isolated      Culture - Blood (09.10.18 @ 23:03)    Specimen Source: .Blood Blood-Peripheral    Culture Results:   No growth to date.    Culture - Blood (09.10.18 @ 23:02)    Specimen Source: .Blood Blood-Peripheral    Culture Results:   No growth to date.      Culture - Urine (09.09.18 @ 21:33)    Specimen Source: .Urine Clean Catch (Midstream)    Culture Results:   <10,000 CFU/ml Normal Urogenital zehra present      RADIOLOGY:    EXAM:  MR MRCP WAW IC                            PROCEDURE DATE:  09/12/2018          INTERPRETATION:  MR of the abdomen without and with IV contrast including   MRCP    Indication: Dilated common bile duct.    Technique: Utilizing a 1.5 Jennifer high-field magnet, multiplanar   multisequence MR images of the abdomen are acquired without and with IV   contrast (6 cc Gadavist ministered, 4 cc discarded), supplemented by thin   and thick slab MRCP images. Postcontrast images are acquired dynamically.    Comparison: No prior abdominal MR is available for comparison. Reference   is made with a previous abdominal CT dated 9/9/2018.    Findings: The gallbladder is contracted. Small layering sludge in the   gallbladder without evidence for gallstone.No grossly thickened   gallbladder wall or pericholecystic fluid.    The common bile duct measures up to 6 mm in caliber which is within   normal limits. No suspicious filling defect in the common bile duct to   suggest choledocholithiasis. The main pancreatic duct is not dilated.     A few small cysts in the liver measuring up to 1.0 cm. The pancreas,   spleen, and adrenals appear unremarkable.    A few small cysts in the kidneys bilaterally measuring up to 1.7 cm in   the lower left kidney. This largest cyst has internal debris. In the   upper left kidney, there is a small area of striated hypoenhancement   which also was seen on the recent abdominal CT dated 9/9/2018, suggestive   of pyelonephritis. There are small nonenhancing foci withinthis area of   striated hypoenhancement in the upper left kidney measuring up to 8 mm,   suggestive of associated microabscesses.    Small ascites. No evidence for enlarged lymph node.    There is apparent mural thickening of the duodenum, suggestiveof   nonspecific duodenitis or peptic ulcer disease. This was seen on the   recent CT.    Impression: The common bile duct measures up to 6 mm in caliber which is   within normal limits. No suspicious filling defect in the common bile   duct to suggest choledocholithiasis. The main pancreatic duct is not   dilated.     In the upper left kidney, there is a small area of striated   hypoenhancement, suggestive of pyelonephritis. Small nonenhancing foci   within this area of striated hypoenhancement in the upper left kidney   measuring up to 8 mm, suggestive of associated microabscesses. Follow-up   abdominal MR in 4-6 weeks may be pursued to ensure resolution or   improvement, and to rule out malignant neoplastic process.    Other findings as above.            EXAM:  US KIDNEY(S)                            PROCEDURE DATE:  09/11/2018          INTERPRETATION:  CLINICAL STATEMENT: [nephritis]    TECHNIQUE: Ultrasound of the kidneys.    COMPARISON: CT abdomen pelvis 9/9/2018    FINDINGS:  The right kidney measures 11.2 cm in length. .    The left kidney measures 11.3 cm in length. .    There is no hydronephrosis.    Multiple cysts are noted in the left kidney. Nonspecific small echogenic   foci noted within the left kidney    IMPRESSION:  No hydronephrosis.           CXR:    EXAM:  XR CHEST AP OR PA 1V                            PROCEDURE DATE:  09/09/2018          INTERPRETATION:  History: Upper abdominal pain    Technique:  AP portable    Comparisons:  none    Findings:     Subsegmental atelectasis and volume loss inleft lower   lobe.  . Right lung is clear. No pleural effusions.    The pulmonary   vasculature and aorta are normal for age. Heart size is unremarkable.     The thorax is normal for age.    Impression: Subsegmental atelectasis left lower lobe. Mild relative   elevation of left diaphragm          EXAM:  CT ABDOMEN AND PELVIS OC IC                            PROCEDURE DATE:  09/09/2018          INTERPRETATION:  Abdominal/Pelvic CT    9/9/2018 10:28 PM    Indication: Abdominal pain and nausea, fever and diarrhea for the past 4   days    Technique: Axial images were obtained following oral and IV contrast from   the lung bases through pubic symphysis.       Reformatted coronal and   sagittal images are submitted.    Comparison: None    FINDINGS:    LUNG BASES:  There are no pleural effusions. Cystic bronchiectasis at the   left lung base  PERITONEUM:  There is no free air or focal collection.  No free fluid.  LIVER: At the dome, there is a small lobulated lesion measuring 1.2 cm   with nodular hyperenhancement probably representing hemangioma.   Subcentimeter lucencies too small to characterize..  SPLEEN: Normal.  GALLBLADDER: Contracted.  BILIARY TREE: Unremarkable.  PANCREAS: Normal.  ADRENAL GLANDS: Normal.  KIDNEYS: The left kidney demonstrates striated areas of hypoenhancement  concerning for pyelonephritis. There is a 2 mm stone in the expected   region of the left distal ureter. There are ill-defined lucencies in the   left upper kidney which probably represent microabscesses. There are   scattered cysts in the left lower kidney subcentimeter lucency in the   right kidney too small to characterize.  BOWEL: The stomach is incompletely distended.  Oral contrast is seen as   distally as rectum. There is marked thickening of the duodenum suggesting   duodenitis/peptic ulcer disease. There is no small bowel obstruction or   diverticulitis. The appendix is unremarkable. There is mild thickening of   the transverse and left colon suggesting colitis. Scattered   diverticulosis.    URINARY BLADDER: Collapsed.  PELVIC ORGANS: No pelvic masses.    There is no significant adenopathy. There is diffuse mesenteric edema.  VASCULATURE: The aorta is not dilated. There is mild atherosclerotic   vascular calcification.  RETROPERITONEUM:  There is no mass.  BONES: Unremarkable.  ABDOMINAL WALL: Unremarkable.    IMPRESSION:    Left-sided pyelonephritis with small microabscesses. Mild left renal   pelvic fullness without hydroureter. A 2 mm stone in the course of the   left distal ureter may represent an obstructing stone as opposed to   phlebolith; difficult to assess without ureteral distention or previous   study for comparison. Duodenitis.  Mild colitis.    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 She had lower abdominal pain on thursday morning which she initially thought that is from holding urine for long time or form constipation but it persisted through out day so she went to PCP next day who gave her Cipro and fleet enema for constipation.  Ct abdomen suggested left pyelonephritis and mild colitis.  Patient was started on IV Rocephin and Flagyl.  9/12/18 Patient feels better and is scheduled for cystoscopy, but was cancelled.  9/14/18 WBC mildly increased, patient had EGD and biopsy.  9/15/18 Patient feels much better and stated that diarrhea, and abdominal pain completely resolved, also WBC is improved but still elevated.    Problem/Recommendation - 1:  Problem: Pyelonephritis.   Recommendation:   1- UA & CS suggest contamination.  2- Blood cultures final reports are negative.  3- Renal and bladder sonogram result is noted.  4- Continue Rocephin 1 gm IVPB q day to change to po Ceftin 500 mg q 12 hours(when WBC is normal) till 9/22/18.  5- Encourage hydration.  6- CBC and BMP follow up.  7- Continue IV Flagyl for colitis till 9/22/18.  8- Urology follow up.  9- Follow-up abdominal MRI in 4-6 weeks as per radiology.  10- Follow EGD biopsy result.    Problem/Recommendation - 2:  ·  Problem: Colitis.    Recommendation:   1- IV hydration.  2- stool for ova and parasites was -VE.  3- Stool for culture is noted.  4- stool for CDT was -VE.  5- CBC & BMP follow up.   6- ABX as per problem #1.     Problem/Recommendation - 3:  ·  Problem: COPD (chronic obstructive pulmonary disease).    Recommendation:   1- Bronchodilators.  2- O2 as needed.  3- Pulmonary management, and follow up as needed.    Problem/Recommendation - 4:  ·  Problem: Anemia.    Recommendation:   1-  Closely monitor H & H.  2- Observe for bleeding.     Discussed with medical resident and patient.

## 2018-09-17 VITALS
HEART RATE: 83 BPM | OXYGEN SATURATION: 99 % | SYSTOLIC BLOOD PRESSURE: 135 MMHG | RESPIRATION RATE: 18 BRPM | TEMPERATURE: 98 F | DIASTOLIC BLOOD PRESSURE: 82 MMHG

## 2018-09-17 LAB
ANION GAP SERPL CALC-SCNC: 7 MMOL/L — SIGNIFICANT CHANGE UP (ref 5–17)
BUN SERPL-MCNC: 8 MG/DL — SIGNIFICANT CHANGE UP (ref 7–18)
CALCIUM SERPL-MCNC: 7.8 MG/DL — LOW (ref 8.4–10.5)
CHLORIDE SERPL-SCNC: 109 MMOL/L — HIGH (ref 96–108)
CO2 SERPL-SCNC: 26 MMOL/L — SIGNIFICANT CHANGE UP (ref 22–31)
CREAT SERPL-MCNC: 0.79 MG/DL — SIGNIFICANT CHANGE UP (ref 0.5–1.3)
GLUCOSE SERPL-MCNC: 116 MG/DL — HIGH (ref 70–99)
HCT VFR BLD CALC: 27.3 % — LOW (ref 34.5–45)
HGB BLD-MCNC: 8.7 G/DL — LOW (ref 11.5–15.5)
MAGNESIUM SERPL-MCNC: 1.8 MG/DL — SIGNIFICANT CHANGE UP (ref 1.6–2.6)
MCHC RBC-ENTMCNC: 28.6 PG — SIGNIFICANT CHANGE UP (ref 27–34)
MCHC RBC-ENTMCNC: 32 GM/DL — SIGNIFICANT CHANGE UP (ref 32–36)
MCV RBC AUTO: 89.5 FL — SIGNIFICANT CHANGE UP (ref 80–100)
PHOSPHATE SERPL-MCNC: 2.4 MG/DL — LOW (ref 2.5–4.5)
PLATELET # BLD AUTO: 547 K/UL — HIGH (ref 150–400)
POTASSIUM SERPL-MCNC: 3.6 MMOL/L — SIGNIFICANT CHANGE UP (ref 3.5–5.3)
POTASSIUM SERPL-SCNC: 3.6 MMOL/L — SIGNIFICANT CHANGE UP (ref 3.5–5.3)
RBC # BLD: 3.05 M/UL — LOW (ref 3.8–5.2)
RBC # FLD: 14.2 % — SIGNIFICANT CHANGE UP (ref 10.3–14.5)
SODIUM SERPL-SCNC: 142 MMOL/L — SIGNIFICANT CHANGE UP (ref 135–145)
WBC # BLD: 11 K/UL — HIGH (ref 3.8–10.5)
WBC # FLD AUTO: 11 K/UL — HIGH (ref 3.8–10.5)

## 2018-09-17 PROCEDURE — 96375 TX/PRO/DX INJ NEW DRUG ADDON: CPT

## 2018-09-17 PROCEDURE — 85730 THROMBOPLASTIN TIME PARTIAL: CPT

## 2018-09-17 PROCEDURE — 88312 SPECIAL STAINS GROUP 1: CPT

## 2018-09-17 PROCEDURE — 82306 VITAMIN D 25 HYDROXY: CPT

## 2018-09-17 PROCEDURE — 82550 ASSAY OF CK (CPK): CPT

## 2018-09-17 PROCEDURE — 84443 ASSAY THYROID STIM HORMONE: CPT

## 2018-09-17 PROCEDURE — 83605 ASSAY OF LACTIC ACID: CPT

## 2018-09-17 PROCEDURE — 86900 BLOOD TYPING SEROLOGIC ABO: CPT

## 2018-09-17 PROCEDURE — 86901 BLOOD TYPING SEROLOGIC RH(D): CPT

## 2018-09-17 PROCEDURE — 80053 COMPREHEN METABOLIC PANEL: CPT

## 2018-09-17 PROCEDURE — 85610 PROTHROMBIN TIME: CPT

## 2018-09-17 PROCEDURE — 84484 ASSAY OF TROPONIN QUANT: CPT

## 2018-09-17 PROCEDURE — 82553 CREATINE MB FRACTION: CPT

## 2018-09-17 PROCEDURE — 84100 ASSAY OF PHOSPHORUS: CPT

## 2018-09-17 PROCEDURE — 87046 STOOL CULTR AEROBIC BACT EA: CPT

## 2018-09-17 PROCEDURE — 87040 BLOOD CULTURE FOR BACTERIA: CPT

## 2018-09-17 PROCEDURE — 74183 MRI ABD W/O CNTR FLWD CNTR: CPT

## 2018-09-17 PROCEDURE — 76775 US EXAM ABDO BACK WALL LIM: CPT

## 2018-09-17 PROCEDURE — 96374 THER/PROPH/DIAG INJ IV PUSH: CPT

## 2018-09-17 PROCEDURE — 85027 COMPLETE CBC AUTOMATED: CPT

## 2018-09-17 PROCEDURE — 80061 LIPID PANEL: CPT

## 2018-09-17 PROCEDURE — 87177 OVA AND PARASITES SMEARS: CPT

## 2018-09-17 PROCEDURE — 81001 URINALYSIS AUTO W/SCOPE: CPT

## 2018-09-17 PROCEDURE — 93005 ELECTROCARDIOGRAM TRACING: CPT

## 2018-09-17 PROCEDURE — 82009 KETONE BODYS QUAL: CPT

## 2018-09-17 PROCEDURE — 82746 ASSAY OF FOLIC ACID SERUM: CPT

## 2018-09-17 PROCEDURE — 80048 BASIC METABOLIC PNL TOTAL CA: CPT

## 2018-09-17 PROCEDURE — 87086 URINE CULTURE/COLONY COUNT: CPT

## 2018-09-17 PROCEDURE — 83036 HEMOGLOBIN GLYCOSYLATED A1C: CPT

## 2018-09-17 PROCEDURE — 88305 TISSUE EXAM BY PATHOLOGIST: CPT

## 2018-09-17 PROCEDURE — 74177 CT ABD & PELVIS W/CONTRAST: CPT

## 2018-09-17 PROCEDURE — 87493 C DIFF AMPLIFIED PROBE: CPT

## 2018-09-17 PROCEDURE — 83690 ASSAY OF LIPASE: CPT

## 2018-09-17 PROCEDURE — 71045 X-RAY EXAM CHEST 1 VIEW: CPT

## 2018-09-17 PROCEDURE — 82607 VITAMIN B-12: CPT

## 2018-09-17 PROCEDURE — 87045 FECES CULTURE AEROBIC BACT: CPT

## 2018-09-17 PROCEDURE — 94640 AIRWAY INHALATION TREATMENT: CPT

## 2018-09-17 PROCEDURE — 86850 RBC ANTIBODY SCREEN: CPT

## 2018-09-17 PROCEDURE — 99285 EMERGENCY DEPT VISIT HI MDM: CPT | Mod: 25

## 2018-09-17 PROCEDURE — 83735 ASSAY OF MAGNESIUM: CPT

## 2018-09-17 RX ORDER — BUDESONIDE AND FORMOTEROL FUMARATE DIHYDRATE 160; 4.5 UG/1; UG/1
2 AEROSOL RESPIRATORY (INHALATION)
Qty: 30 | Refills: 0 | OUTPATIENT
Start: 2018-09-17 | End: 2018-10-16

## 2018-09-17 RX ORDER — METRONIDAZOLE 500 MG
500 TABLET ORAL
Qty: 18 | Refills: 0 | OUTPATIENT
Start: 2018-09-17 | End: 2018-09-22

## 2018-09-17 RX ORDER — IPRATROPIUM/ALBUTEROL SULFATE 18-103MCG
3 AEROSOL WITH ADAPTER (GRAM) INHALATION
Qty: 0 | Refills: 0 | COMMUNITY

## 2018-09-17 RX ORDER — TAMSULOSIN HYDROCHLORIDE 0.4 MG/1
1 CAPSULE ORAL
Qty: 30 | Refills: 0
Start: 2018-09-17 | End: 2018-10-16

## 2018-09-17 RX ORDER — PANTOPRAZOLE SODIUM 20 MG/1
1 TABLET, DELAYED RELEASE ORAL
Qty: 30 | Refills: 0 | OUTPATIENT
Start: 2018-09-17 | End: 2018-10-16

## 2018-09-17 RX ORDER — ERGOCALCIFEROL 1.25 MG/1
1 CAPSULE ORAL
Qty: 30 | Refills: 0 | OUTPATIENT
Start: 2018-09-17 | End: 2018-10-16

## 2018-09-17 RX ORDER — FERROUS SULFATE 325(65) MG
1 TABLET ORAL
Qty: 30 | Refills: 0 | OUTPATIENT
Start: 2018-09-17 | End: 2018-10-16

## 2018-09-17 RX ORDER — IPRATROPIUM/ALBUTEROL SULFATE 18-103MCG
3 AEROSOL WITH ADAPTER (GRAM) INHALATION
Qty: 30 | Refills: 0
Start: 2018-09-17 | End: 2018-10-16

## 2018-09-17 RX ORDER — BUDESONIDE AND FORMOTEROL FUMARATE DIHYDRATE 160; 4.5 UG/1; UG/1
2 AEROSOL RESPIRATORY (INHALATION)
Qty: 30 | Refills: 0
Start: 2018-09-17 | End: 2018-10-16

## 2018-09-17 RX ORDER — TAMSULOSIN HYDROCHLORIDE 0.4 MG/1
1 CAPSULE ORAL
Qty: 30 | Refills: 0 | OUTPATIENT
Start: 2018-09-17 | End: 2018-10-16

## 2018-09-17 RX ORDER — ERGOCALCIFEROL 1.25 MG/1
1 CAPSULE ORAL
Qty: 30 | Refills: 0
Start: 2018-09-17 | End: 2018-10-16

## 2018-09-17 RX ORDER — PANTOPRAZOLE SODIUM 20 MG/1
1 TABLET, DELAYED RELEASE ORAL
Qty: 15 | Refills: 0 | OUTPATIENT
Start: 2018-09-17 | End: 2018-10-01

## 2018-09-17 RX ORDER — IPRATROPIUM/ALBUTEROL SULFATE 18-103MCG
3 AEROSOL WITH ADAPTER (GRAM) INHALATION
Qty: 30 | Refills: 0 | OUTPATIENT
Start: 2018-09-17 | End: 2018-10-16

## 2018-09-17 RX ORDER — BUDESONIDE AND FORMOTEROL FUMARATE DIHYDRATE 160; 4.5 UG/1; UG/1
2 AEROSOL RESPIRATORY (INHALATION)
Qty: 0 | Refills: 0 | COMMUNITY

## 2018-09-17 RX ORDER — ALBUTEROL 90 UG/1
1 AEROSOL, METERED ORAL
Qty: 30 | Refills: 0 | OUTPATIENT
Start: 2018-09-17 | End: 2018-10-16

## 2018-09-17 RX ORDER — CEFUROXIME AXETIL 250 MG
1 TABLET ORAL
Qty: 12 | Refills: 0
Start: 2018-09-17 | End: 2018-09-22

## 2018-09-17 RX ORDER — FERROUS SULFATE 325(65) MG
1 TABLET ORAL
Qty: 30 | Refills: 0
Start: 2018-09-17 | End: 2018-10-16

## 2018-09-17 RX ORDER — METRONIDAZOLE 500 MG
500 TABLET ORAL
Qty: 18 | Refills: 0
Start: 2018-09-17 | End: 2018-09-22

## 2018-09-17 RX ORDER — PANTOPRAZOLE SODIUM 20 MG/1
1 TABLET, DELAYED RELEASE ORAL
Qty: 15 | Refills: 0
Start: 2018-09-17 | End: 2018-10-01

## 2018-09-17 RX ORDER — CEFUROXIME AXETIL 250 MG
1 TABLET ORAL
Qty: 12 | Refills: 0 | OUTPATIENT
Start: 2018-09-17 | End: 2018-09-22

## 2018-09-17 RX ADMIN — CEFTRIAXONE 100 GRAM(S): 500 INJECTION, POWDER, FOR SOLUTION INTRAMUSCULAR; INTRAVENOUS at 07:13

## 2018-09-17 RX ADMIN — Medication 325 MILLIGRAM(S): at 12:34

## 2018-09-17 RX ADMIN — Medication 100 MILLIGRAM(S): at 05:39

## 2018-09-17 RX ADMIN — ENOXAPARIN SODIUM 40 MILLIGRAM(S): 100 INJECTION SUBCUTANEOUS at 12:34

## 2018-09-17 RX ADMIN — PANTOPRAZOLE SODIUM 40 MILLIGRAM(S): 20 TABLET, DELAYED RELEASE ORAL at 05:39

## 2018-09-17 NOTE — PROGRESS NOTE ADULT - NSHPATTENDINGPLANDISCUSS_GEN_ALL_CORE
signed out to covering special surgery pa.
house staff covering

## 2018-09-17 NOTE — PROGRESS NOTE ADULT - NEGATIVE RESPIRATORY AND THORAX SYMPTOMS
no cough/no dyspnea/no hemoptysis/no wheezing
no dyspnea/no cough/no hemoptysis/no wheezing
no dyspnea/no wheezing/no cough/no hemoptysis
no cough/no hemoptysis/no wheezing/no dyspnea

## 2018-09-17 NOTE — PROGRESS NOTE ADULT - REASON FOR ADMISSION
lower abdominal pain for 4 days

## 2018-09-17 NOTE — PROGRESS NOTE ADULT - NEGATIVE HEMATOLOGY SYMPTOMS
no gum bleeding/no nose bleeding
no nose bleeding/no gum bleeding

## 2018-09-17 NOTE — PROGRESS NOTE ADULT - NEGATIVE GASTROINTESTINAL SYMPTOMS
no abdominal pain/no melena/no jaundice/no hematochezia/no nausea/no vomiting/no diarrhea
no abdominal pain/no hematochezia/no diarrhea/no vomiting/no melena/no jaundice/no nausea
no nausea/no melena/no jaundice/no abdominal pain/no diarrhea/no hematochezia/no vomiting
no nausea/no vomiting/no diarrhea/no abdominal pain/no melena/no jaundice

## 2018-09-17 NOTE — PROGRESS NOTE ADULT - NEUROLOGICAL DETAILS
sensation intact/responds to pain/responds to verbal commands
alert and oriented x 3/responds to pain/sensation intact/responds to verbal commands
responds to pain/responds to verbal commands/sensation intact
sensation intact/responds to pain/responds to verbal commands

## 2018-09-17 NOTE — PROGRESS NOTE ADULT - NEGATIVE PSYCHIATRIC SYMPTOMS
no anxiety/no depression/no suicidal ideation
no anxiety/no depression/no suicidal ideation
no anxiety/no suicidal ideation/no depression
no suicidal ideation/no anxiety/no depression

## 2018-09-17 NOTE — PROGRESS NOTE ADULT - PROBLEM SELECTOR PROBLEM 2
Bronchiectasis
Colitis
Duodenitis
Kidney stone
Kidney stone

## 2018-09-17 NOTE — PROGRESS NOTE ADULT - GASTROINTESTINAL DETAILS
no rebound tenderness/no rigidity/no guarding/bowel sounds normal/soft/nontender/no distention
bowel sounds normal/nontender/no rigidity/no guarding/soft/no rebound tenderness
no distention/no guarding/bowel sounds normal/soft/nontender/no rebound tenderness/no rigidity
no guarding/no rebound tenderness/no rigidity/soft/bowel sounds normal/nontender/no distention

## 2018-09-17 NOTE — PROGRESS NOTE ADULT - MS EXT PE MLT D E PC
no cyanosis/no clubbing
no clubbing/no cyanosis
no clubbing/no cyanosis
no clubbing/no pedal edema/no cyanosis

## 2018-09-17 NOTE — PROGRESS NOTE ADULT - PROBLEM SELECTOR PROBLEM 3
Atelectasis
Asthma
Pyelonephritis

## 2018-09-17 NOTE — PROGRESS NOTE ADULT - SUBJECTIVE AND OBJECTIVE BOX
Patient is a 63y old  Female who presents with a chief complaint of lower abdominal pain for 4 days (17 Sep 2018 09:11)  Awake, alert, comfortable in bed in [facility].    INTERVAL HPI/OVERNIGHT EVENTS:      VITAL SIGNS:  T(F): 97.8 (09-17-18 @ 05:10)  HR: 73 (09-17-18 @ 05:10)  BP: 130/73 (09-17-18 @ 05:10)  RR: 18 (09-17-18 @ 05:10)  SpO2: 98% (09-17-18 @ 05:10)  Wt(kg): --  I&O's Detail          REVIEW OF SYSTEMS:    CONSTITUTIONAL:  No fevers, chills, sweats    HEENT:  Eyes:  No diplopia or blurred vision. ENT:  No earache, sore throat or runny nose.    CARDIOVASCULAR:  No pressure, squeezing, tightness, or heaviness about the chest; no palpitations.    RESPIRATORY:  Per HPI    GASTROINTESTINAL:  No abdominal pain, nausea, vomiting or diarrhea.    GENITOURINARY:  No dysuria, frequency or urgency.    NEUROLOGIC:  No paresthesias, fasciculations, seizures or weakness.    PSYCHIATRIC:  No disorder of thought or mood.      PHYSICAL EXAM:    Constitutional: Well developed and nourished  Eyes:Perrla  ENMT: normal  Neck:supple  Respiratory: good air entry  Cardiovascular: S1 S2 regular  Gastrointestinal: Soft, Non tender  Extremities: No edema  Vascular:normal  Neurological:Awake, alert,Ox3  Musculoskeletal:Normal      MEDICATIONS  (STANDING):  ALBUTerol    90 MICROgram(s) HFA Inhaler 1 Puff(s) Inhalation every 6 hours  buDESOnide  80 MICROgram(s)/formoterol 4.5 MICROgram(s) Inhaler 2 Puff(s) Inhalation two times a day  cefTRIAXone   IVPB 1 Gram(s) IV Intermittent every 24 hours  dextrose 5% + sodium chloride 0.9%. 1000 milliLiter(s) (70 mL/Hr) IV Continuous <Continuous>  enoxaparin Injectable 40 milliGRAM(s) SubCutaneous daily  ergocalciferol 76642 Unit(s) Oral <User Schedule>  ferrous    sulfate 325 milliGRAM(s) Oral daily  melatonin 3 milliGRAM(s) Oral at bedtime  metroNIDAZOLE  IVPB 500 milliGRAM(s) IV Intermittent every 8 hours  pantoprazole    Tablet 40 milliGRAM(s) Oral before breakfast  sodium chloride 0.9% with potassium chloride 20 mEq/L 1000 milliLiter(s) (70 mL/Hr) IV Continuous <Continuous>  tamsulosin 0.4 milliGRAM(s) Oral at bedtime    MEDICATIONS  (PRN):  acetaminophen   Tablet .. 650 milliGRAM(s) Oral every 6 hours PRN Temp greater or equal to 38C (100.4F)  zolpidem 5 milliGRAM(s) Oral at bedtime PRN Insomnia      Allergies    Motrin (Swelling)    Intolerances        LABS:                        8.7    11.0  )-----------( 547      ( 17 Sep 2018 06:16 )             27.3     09-17    142  |  109<H>  |  8   ----------------------------<  116<H>  3.6   |  26  |  0.79    Ca    7.8<L>      17 Sep 2018 06:16  Phos  2.4     09-17  Mg     1.8     09-17                CAPILLARY BLOOD GLUCOSE            RADIOLOGY & ADDITIONAL TESTS:    CXR:    Ct scan chest:    ekg;    echo:

## 2018-09-17 NOTE — PROGRESS NOTE ADULT - SUBJECTIVE AND OBJECTIVE BOX
[   ] ICU                                          [   ] CCU                                      [  X ] Medical Floor    Patient is comfortable. No new complaints reported, No abdominal pain, N/V, hematemesis, hematochezia, melena, fever, chills, chest pain, SOB, cough or diarrhea reported.    VITALS  Vital Signs Last 24 Hrs  T(C): 36.6 (17 Sep 2018 05:10), Max: 37.1 (16 Sep 2018 22:18)  T(F): 97.8 (17 Sep 2018 05:10), Max: 98.8 (16 Sep 2018 22:18)  HR: 73 (17 Sep 2018 05:10) (73 - 75)  BP: 130/73 (17 Sep 2018 05:10) (121/69 - 130/73)   RR: 18 (17 Sep 2018 05:10) (17 - 18)  SpO2: 98% (17 Sep 2018 05:10) (98% - 100%)       MEDICATIONS  (STANDING):  ALBUTerol    90 MICROgram(s) HFA Inhaler 1 Puff(s) Inhalation every 6 hours  buDESOnide  80 MICROgram(s)/formoterol 4.5 MICROgram(s) Inhaler 2 Puff(s) Inhalation two times a day  cefTRIAXone   IVPB 1 Gram(s) IV Intermittent every 24 hours  dextrose 5% + sodium chloride 0.9%. 1000 milliLiter(s) (70 mL/Hr) IV Continuous <Continuous>  enoxaparin Injectable 40 milliGRAM(s) SubCutaneous daily  ergocalciferol 52344 Unit(s) Oral <User Schedule>  ferrous    sulfate 325 milliGRAM(s) Oral daily  melatonin 3 milliGRAM(s) Oral at bedtime  metroNIDAZOLE  IVPB 500 milliGRAM(s) IV Intermittent every 8 hours  pantoprazole    Tablet 40 milliGRAM(s) Oral before breakfast  sodium chloride 0.9% with potassium chloride 20 mEq/L 1000 milliLiter(s) (70 mL/Hr) IV Continuous <Continuous>  tamsulosin 0.4 milliGRAM(s) Oral at bedtime    MEDICATIONS  (PRN):  acetaminophen   Tablet .. 650 milliGRAM(s) Oral every 6 hours PRN Temp greater or equal to 38C (100.4F)  zolpidem 5 milliGRAM(s) Oral at bedtime PRN Insomnia                            8.7    11.0  )-----------( 547      ( 17 Sep 2018 06:16 )             27.3       09-17    142  |  109<H>  |  8   ----------------------------<  116<H>  3.6   |  26  |  0.79    Ca    7.8<L>      17 Sep 2018 06:16  Phos  2.4     09-17  Mg     1.8     09-17

## 2018-09-17 NOTE — PROGRESS NOTE ADULT - MOUTH
normal mouth and gums/moist
moist/normal mouth and gums
moist/normal mouth and gums
normal mouth and gums/moist

## 2018-09-17 NOTE — PROGRESS NOTE ADULT - CONSTITUTIONAL DETAILS
well-developed/well-groomed/well-nourished
well-groomed/well-developed
well-groomed/well-nourished/well-developed/no distress
well-nourished/well-developed/no distress/well-groomed

## 2018-09-17 NOTE — PROGRESS NOTE ADULT - PROBLEM SELECTOR PROBLEM 4
Pyelonephritis
Bronchiectasis
Renal colic

## 2018-09-17 NOTE — PROGRESS NOTE ADULT - PROBLEM SELECTOR PLAN 3
incentive spirometry  Bronchodilators  chest pt
incentive spirometry  Bronchodilators  chest pt
incentive spirometry  Bronchodilators  chest pt.
Bronchodilators prn  continue symbicort 160/4.5 mcgs 2 puffs po BID  Pfts as OP.
CT abdomen showed Left-sided pyelonephritis with small microabscesses. Mild left renal   pelvic fullness without hydroureter. A 2 mm stone in the course of the   left distal ureter may represent an obstructing stone as opposed to   phlebolith; difficult to assess without ureteral distention or previous   study for comparison.  Renal/Bladder US ordered  BC X 2 ordered  UA +  UC testing  Ceftriaxone IVF  Urology consulted and possibly cystoscopy with stent placement in am.  Pt NPO after MN  Flomax started  Dr. Ace, ID, consulted

## 2018-09-17 NOTE — PROGRESS NOTE ADULT - NEGATIVE GENERAL SYMPTOMS
no chills/no sweating/no anorexia/no fever
no fever/no chills/no sweating
no sweating/no fever/no chills
no fever/no chills/no sweating

## 2018-09-17 NOTE — PROGRESS NOTE ADULT - PROBLEM SELECTOR PROBLEM 1
COPD (chronic obstructive pulmonary disease)
Colitis
Pyelonephritis

## 2018-09-17 NOTE — PROGRESS NOTE ADULT - SUBJECTIVE AND OBJECTIVE BOX
Meds:  cefTRIAXone   IVPB 1 Gram(s) IV Intermittent every 24 hours  metroNIDAZOLE  IVPB 500 milliGRAM(s) IV Intermittent every 8 hours    Allergies:  Allergies    Motrin (Swelling)    Intolerances      ROS  [  ] UNABLE TO ELICIT    General:  [ x ] None  [  ] Fever  [  ] Chills  [  ] Malaise    Skin:  [ x ] None [  ] Rash  [  ] Wound  [  ] Ulcer    HEENT:  [ x ] None  [  ] Sore Throat  [  ] Nasal congestion/ runny nose  [  ] Photophobia [  ] Neck pain      Chest:  [ x ] None   [  ] SOB  [  ] Cough  [  ] None    Cardiovascular:   [ x ] None  [  ] CP  [  ] Palpitation    Gastrointestinal:  [ x ] None  [ x ] Abd pain(resolving)   [  ] Nausea    [  ] Vomiting   [  ] Diarrhea     Genitourinary:  [ x ] None [  ] Polyuria   [  ] Urgency  [  ] Frequency  [  ] Dysuria    [  ]  Hematuria       Musculoskeletal:  [ x ] None [  ] Back Pain	[  ] Body aches  [  ] Joint pain [  ] Weakness    Neurological: [ x ] None [  ]Dizziness  [  ]Visual Disturbance  [  ]Headaches   [  ] Weakness      PHYSICAL EXAM:  -------------------  Vital Signs Last 24 Hrs  T(C): 36.6 (17 Sep 2018 05:10), Max: 37.1 (16 Sep 2018 22:18)  T(F): 97.8 (17 Sep 2018 05:10), Max: 98.8 (16 Sep 2018 22:18)  HR: 73 (17 Sep 2018 05:10) (73 - 81)  BP: 130/73 (17 Sep 2018 05:10) (113/61 - 130/73)  BP(mean): --  RR: 18 (17 Sep 2018 05:10) (16 - 18)  SpO2: 98% (17 Sep 2018 05:10) (98% - 100%)    Constitutional: feels better.    HEENT: [ x ] Wnl  [  ] Pharyngeal congestion    Neck:  [ x ] Supple  [  ]Lymphadenopathy  [ x ] No JVD   [  ] JVD  [  ] Masses   [  ] WNL    CHEST/Respiratory:  [ x ]Clear to auscultation  [  ] Rales   [  ] Rhonchi   [  ] Wheezing     [  ] Chest Tenderness      Cardiovascular:  [ x ] Reg S1 S2   [  ] Irreg S1 S2   [ x ]No Murmur  [  ] +ve Murmurs  [  ]Systolic [  ]Diastolic      Abdomen:  [ x ] Soft  [ x ] No tendrerness  [  ] Tenderness  [  ] Organomegaly  [  ] ABD Distention  [  ] Rigidity                       [ x ] No Regidity                       [ x ] No Rebound Tenderness  [ x ] No Guarding Rigidity  [  ] Rebound Tenderness[  ] Guarding Rigidity                          [ x ]  +ve Bowel Sounds  [  ] Decreased Bowel Sounds    [  ] Absent Bowel Sounds                            Extremities: [ x ] No edema [  ] Edema  [  ] Clubbing   [  ] Cyanosis                         [ x ] No Tender Calf muscles  [  ] Tender Calf muscles                        [ x ] Palpable peripheral pulses    Neurological: [ x ] Awake  [ x ] Alert  [ x ] Oriented  x  3                           [  ] Confused  [  ] Drowsy  [  ] respond to painful stimuli  [  ] Unresponsive    Skin:  [ x ] Intact [  ] Redness [  ] Thrombophlebitis  [  ] Rashes  [  ] Dry  [  ] Ulcers    Ortho:  [  ] Joint Swelling  [  ] Joint erythema [  ] Joint tenderness                [  ] Increased temp. to touch  [  ] DJD [ x ] WNL          LABS/DIAGNOSTIC TESTS                        8.7    11.0  )-----------( 547      ( 17 Sep 2018 06:16 )             27.3   09-17    142  |  109<H>  |  8   ----------------------------<  116<H>  3.6   |  26  |  0.79    Ca    7.8<L>      17 Sep 2018 06:16  Phos  2.4     09-17  Mg     1.8     09-17              C Diff by PCR Result: NotDetec (09.11.18 @ 17:48)          CULTURES:   Ova and Parasites (09.12.18 @ 17:10)    Culture Results:   No Protozoa seen by trichrome stain  No Helminths or Protozoa seen in formalin concentrate  performed by iodine stain  (routine O+P not evaluated for Microsporidia,  Cryptosporidia, Cyclospora, or Isospora.)  Note: One negative specimen does not rule  out the possibility of a parasitic infection.      Culture - Stool (09.11.18 @ 18:39)    Specimen Source: .Stool Feces    Culture Results:   Few Yeast like cells  No enteric pathogens to date: Final culture pending  No enteric gram negative rods isolated      Culture - Blood (09.10.18 @ 23:03)    Specimen Source: .Blood Blood-Peripheral    Culture Results:   No growth to date.    Culture - Blood (09.10.18 @ 23:02)    Specimen Source: .Blood Blood-Peripheral    Culture Results:   No growth to date.      Culture - Urine (09.09.18 @ 21:33)    Specimen Source: .Urine Clean Catch (Midstream)    Culture Results:   <10,000 CFU/ml Normal Urogenital zehra present      RADIOLOGY:    EXAM:  MR MRCP WAW IC                            PROCEDURE DATE:  09/12/2018          INTERPRETATION:  MR of the abdomen without and with IV contrast including   MRCP    Indication: Dilated common bile duct.    Technique: Utilizing a 1.5 Jennifer high-field magnet, multiplanar   multisequence MR images of the abdomen are acquired without and with IV   contrast (6 cc Gadavist ministered, 4 cc discarded), supplemented by thin   and thick slab MRCP images. Postcontrast images are acquired dynamically.    Comparison: No prior abdominal MR is available for comparison. Reference   is made with a previous abdominal CT dated 9/9/2018.    Findings: The gallbladder is contracted. Small layering sludge in the   gallbladder without evidence for gallstone.No grossly thickened   gallbladder wall or pericholecystic fluid.    The common bile duct measures up to 6 mm in caliber which is within   normal limits. No suspicious filling defect in the common bile duct to   suggest choledocholithiasis. The main pancreatic duct is not dilated.     A few small cysts in the liver measuring up to 1.0 cm. The pancreas,   spleen, and adrenals appear unremarkable.    A few small cysts in the kidneys bilaterally measuring up to 1.7 cm in   the lower left kidney. This largest cyst has internal debris. In the   upper left kidney, there is a small area of striated hypoenhancement   which also was seen on the recent abdominal CT dated 9/9/2018, suggestive   of pyelonephritis. There are small nonenhancing foci withinthis area of   striated hypoenhancement in the upper left kidney measuring up to 8 mm,   suggestive of associated microabscesses.    Small ascites. No evidence for enlarged lymph node.    There is apparent mural thickening of the duodenum, suggestiveof   nonspecific duodenitis or peptic ulcer disease. This was seen on the   recent CT.    Impression: The common bile duct measures up to 6 mm in caliber which is   within normal limits. No suspicious filling defect in the common bile   duct to suggest choledocholithiasis. The main pancreatic duct is not   dilated.     In the upper left kidney, there is a small area of striated   hypoenhancement, suggestive of pyelonephritis. Small nonenhancing foci   within this area of striated hypoenhancement in the upper left kidney   measuring up to 8 mm, suggestive of associated microabscesses. Follow-up   abdominal MR in 4-6 weeks may be pursued to ensure resolution or   improvement, and to rule out malignant neoplastic process.    Other findings as above.            EXAM:  US KIDNEY(S)                            PROCEDURE DATE:  09/11/2018          INTERPRETATION:  CLINICAL STATEMENT: [nephritis]    TECHNIQUE: Ultrasound of the kidneys.    COMPARISON: CT abdomen pelvis 9/9/2018    FINDINGS:  The right kidney measures 11.2 cm in length. .    The left kidney measures 11.3 cm in length. .    There is no hydronephrosis.    Multiple cysts are noted in the left kidney. Nonspecific small echogenic   foci noted within the left kidney    IMPRESSION:  No hydronephrosis.           CXR:    EXAM:  XR CHEST AP OR PA 1V                            PROCEDURE DATE:  09/09/2018          INTERPRETATION:  History: Upper abdominal pain    Technique:  AP portable    Comparisons:  none    Findings:     Subsegmental atelectasis and volume loss inleft lower   lobe.  . Right lung is clear. No pleural effusions.    The pulmonary   vasculature and aorta are normal for age. Heart size is unremarkable.     The thorax is normal for age.    Impression: Subsegmental atelectasis left lower lobe. Mild relative   elevation of left diaphragm          EXAM:  CT ABDOMEN AND PELVIS OC IC                            PROCEDURE DATE:  09/09/2018          INTERPRETATION:  Abdominal/Pelvic CT    9/9/2018 10:28 PM    Indication: Abdominal pain and nausea, fever and diarrhea for the past 4   days    Technique: Axial images were obtained following oral and IV contrast from   the lung bases through pubic symphysis.       Reformatted coronal and   sagittal images are submitted.    Comparison: None    FINDINGS:    LUNG BASES:  There are no pleural effusions. Cystic bronchiectasis at the   left lung base  PERITONEUM:  There is no free air or focal collection.  No free fluid.  LIVER: At the dome, there is a small lobulated lesion measuring 1.2 cm   with nodular hyperenhancement probably representing hemangioma.   Subcentimeter lucencies too small to characterize..  SPLEEN: Normal.  GALLBLADDER: Contracted.  BILIARY TREE: Unremarkable.  PANCREAS: Normal.  ADRENAL GLANDS: Normal.  KIDNEYS: The left kidney demonstrates striated areas of hypoenhancement  concerning for pyelonephritis. There is a 2 mm stone in the expected   region of the left distal ureter. There are ill-defined lucencies in the   left upper kidney which probably represent microabscesses. There are   scattered cysts in the left lower kidney subcentimeter lucency in the   right kidney too small to characterize.  BOWEL: The stomach is incompletely distended.  Oral contrast is seen as   distally as rectum. There is marked thickening of the duodenum suggesting   duodenitis/peptic ulcer disease. There is no small bowel obstruction or   diverticulitis. The appendix is unremarkable. There is mild thickening of   the transverse and left colon suggesting colitis. Scattered   diverticulosis.    URINARY BLADDER: Collapsed.  PELVIC ORGANS: No pelvic masses.    There is no significant adenopathy. There is diffuse mesenteric edema.  VASCULATURE: The aorta is not dilated. There is mild atherosclerotic   vascular calcification.  RETROPERITONEUM:  There is no mass.  BONES: Unremarkable.  ABDOMINAL WALL: Unremarkable.    IMPRESSION:    Left-sided pyelonephritis with small microabscesses. Mild left renal   pelvic fullness without hydroureter. A 2 mm stone in the course of the   left distal ureter may represent an obstructing stone as opposed to   phlebolith; difficult to assess without ureteral distention or previous   study for comparison. Duodenitis.  Mild colitis.    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 She had lower abdominal pain on thursday morning which she initially thought that is from holding urine for long time or form constipation but it persisted through out day so she went to PCP next day who gave her Cipro and fleet enema for constipation.  Ct abdomen suggested left pyelonephritis and mild colitis.  Patient was started on IV Rocephin and Flagyl.  9/12/18 Patient feels better and is scheduled for cystoscopy, but was cancelled.  9/14/18 WBC mildly increased, patient had EGD and biopsy.  9/15/18 Patient feels much better and stated that diarrhea, and abdominal pain completely resolved, also WBC is improved but still elevated.  9/17/18 Patient feels better and WBC continue to improve.    Problem/Recommendation - 1:  Problem: Pyelonephritis.   Recommendation:   1- UA & CS suggest contamination.  2- Blood cultures final reports are negative.  3- Renal and bladder sonogram result is noted.  4- Continue Rocephin 1 gm IVPB q day to change upon discharge to po Ceftin 500 mg q 12 hours till 9/22/18.  5- Encourage hydration.  6- CBC and BMP follow up.  7- Continue IV Flagyl for colitis till 9/22/18.  8- Urology follow up.  9- Follow-up abdominal MRI in 4-6 weeks as per radiology.  10- Follow EGD biopsy result.    Problem/Recommendation - 2:  ·  Problem: Colitis.    Recommendation:   1- IV hydration.  2- stool for ova and parasites was -VE.  3- Stool for culture is noted.  4- stool for CDT was -VE.  5- CBC & BMP follow up.   6- ABX as per problem #1.     Problem/Recommendation - 3:  ·  Problem: COPD (chronic obstructive pulmonary disease).    Recommendation:   1- Bronchodilators.  2- O2 as needed.  3- Pulmonary management, and follow up as needed.    Problem/Recommendation - 4:  ·  Problem: Anemia.    Recommendation:   1-  Closely monitor H & H.  2- Observe for bleeding.     Discussed with medical resident and patient.

## 2018-09-17 NOTE — PROGRESS NOTE ADULT - NEGATIVE CARDIOVASCULAR SYMPTOMS
no chest pain/no palpitations/no orthopnea
no orthopnea/no palpitations/no chest pain
no palpitations/no orthopnea/no chest pain
no peripheral edema/no chest pain/no palpitations

## 2018-09-17 NOTE — PROGRESS NOTE ADULT - NEGATIVE OPHTHALMOLOGIC SYMPTOMS
no diplopia/no photophobia
no photophobia/no diplopia

## 2018-09-17 NOTE — PROGRESS NOTE ADULT - SUBJECTIVE AND OBJECTIVE BOX
Patient is a 63y old  Female who presents with a chief complaint of lower abdominal pain for 4 days (16 Sep 2018 21:06)    pt seen in icu [  ], reg med floor [ x  ], bed [x  ], chair at bedside [   ], a+o x3 [ x ], lethargic [  ],  nad [x ]      Allergies    Motrin (Swelling)        Vitals    T(F): 97.8 (09-17-18 @ 05:10), Max: 98.8 (09-16-18 @ 22:18)  HR: 73 (09-17-18 @ 05:10) (73 - 81)  BP: 130/73 (09-17-18 @ 05:10) (113/61 - 130/73)  RR: 18 (09-17-18 @ 05:10) (16 - 18)  SpO2: 98% (09-17-18 @ 05:10) (98% - 100%)  Wt(kg): --  CAPILLARY BLOOD GLUCOSE          Labs                          8.7    11.0  )-----------( 547      ( 17 Sep 2018 06:16 )             27.3       09-17    142  |  109<H>  |  8   ----------------------------<  116<H>  3.6   |  26  |  0.79    Ca    7.8<L>      17 Sep 2018 06:16  Phos  2.4     09-17  Mg     1.8     09-17              .Stool Feces  09-12 @ 17:10   No Protozoa seen by trichrome stain  No Helminths or Protozoa seen in formalin concentrate  performed by iodine stain  (routine O+P not evaluated for Microsporidia,  Cryptosporidia, Cyclospora, or Isospora.)  Note: One negative specimen does not rule  out the possibility of a parasitic infection.  --  --      .Stool Feces  09-11 @ 18:39   Few Yeast like cells  No enteric pathogens isolated.  (Stool culture examined for Salmonella,  Shigella, Campylobacter, Aeromonas, Plesiomonas,  Vibrio, E.coli O157 and Yersinia)  No enteric gram negative rods isolated  --  --      .Blood Blood-Peripheral  09-10 @ 23:03   No growth at 5 days.  --  --      .Blood Blood-Peripheral  09-10 @ 23:02   No growth at 5 days.  --  --      .Urine Clean Catch (Midstream)  09-09 @ 21:33   <10,000 CFU/ml Normal Urogenital zehra present  --  --          Radiology Results      Meds    MEDICATIONS  (STANDING):  ALBUTerol    90 MICROgram(s) HFA Inhaler 1 Puff(s) Inhalation every 6 hours  buDESOnide  80 MICROgram(s)/formoterol 4.5 MICROgram(s) Inhaler 2 Puff(s) Inhalation two times a day  cefTRIAXone   IVPB 1 Gram(s) IV Intermittent every 24 hours  dextrose 5% + sodium chloride 0.9%. 1000 milliLiter(s) (70 mL/Hr) IV Continuous <Continuous>  enoxaparin Injectable 40 milliGRAM(s) SubCutaneous daily  ergocalciferol 89885 Unit(s) Oral <User Schedule>  ferrous    sulfate 325 milliGRAM(s) Oral daily  melatonin 3 milliGRAM(s) Oral at bedtime  metroNIDAZOLE  IVPB 500 milliGRAM(s) IV Intermittent every 8 hours  pantoprazole    Tablet 40 milliGRAM(s) Oral before breakfast  sodium chloride 0.9% with potassium chloride 20 mEq/L 1000 milliLiter(s) (70 mL/Hr) IV Continuous <Continuous>  tamsulosin 0.4 milliGRAM(s) Oral at bedtime      MEDICATIONS  (PRN):  acetaminophen   Tablet .. 650 milliGRAM(s) Oral every 6 hours PRN Temp greater or equal to 38C (100.4F)  zolpidem 5 milliGRAM(s) Oral at bedtime PRN Insomnia      Physical Exam      Neuro :  no focal deficits  Respiratory: CTA B/L  CV: RRR, S1S2, no murmurs,   Abdominal: Soft, NT, ND +BS,  Extremities: No edema, + peripheral pulses    ASSESSMENT    left pyelonephritis with micro abcesses,   left distal ureteral stone,   colitis,   duodenitis,   anemia,   h/o asthma  COPD (chronic obstructive pulmonary disease)  S/P thyroid surgery  S/P RANDOLPH-BSO      PLAN      urology f/u   no surgical intervention at this time  outpatient f/u with Dr. Jackson.  cont flomax  strain all urine indication of kidney stones  s/p MRCP noted above, striated hypoenhancement in the upper left kidney   measuring up to 8 mm, suggestive of associated microabscesses  Follow-up abd MRI in 4-6 weeks as per radiology  id f/u noted  f/u cbc  d/c Rocephin and start ceftin 500mg q12 to cont until 09/22/18   cont flagyl po until 09/22/18  blood cx neg noted   u cx neg noted   renal bladder sono neg results noted. There is no hydronephrosis. Multiple cysts are noted in the left kidney.   Nonspecific small echogenic foci noted within the left kidney  gi f/u   EGD with esophagitis, gastritis, prepyloric ulcer, hiatal hernia  cont protonix  stool studies neg noted above  c diff negative noted   pulm f/u   tolerating diet  potassium wnl  cont current meds  pt stable for d/c

## 2018-09-17 NOTE — PROGRESS NOTE ADULT - SUBJECTIVE AND OBJECTIVE BOX
Patient is a 63y old  Female who presents with a chief complaint of lower abdominal pain for 4 days (17 Sep 2018 09:11)    Awake, alert, comfortable in bed in NAD. Had EGD because of abdominal pain. Denies cough or sob at rest . Clinically stable while on antibiotics. Pt has no new complaints.    INTERVAL HPI/OVERNIGHT EVENTS:      VITAL SIGNS:  T(F): 97.8 (09-17-18 @ 05:10)  HR: 73 (09-17-18 @ 05:10)  BP: 130/73 (09-17-18 @ 05:10)  RR: 18 (09-17-18 @ 05:10)  SpO2: 98% (09-17-18 @ 05:10)  Wt(kg): --  I&O's Detail          REVIEW OF SYSTEMS:    CONSTITUTIONAL:  No fevers, chills, sweats    HEENT:  Eyes:  No diplopia or blurred vision. ENT:  No earache, sore throat or runny nose.    CARDIOVASCULAR:  No pressure, squeezing, tightness, or heaviness about the chest; no palpitations.    RESPIRATORY:  Per HPI    GASTROINTESTINAL:  No abdominal pain, nausea, vomiting or diarrhea.    GENITOURINARY:  No dysuria, frequency or urgency.    NEUROLOGIC:  No paresthesias, fasciculations, seizures or weakness.    PSYCHIATRIC:  No disorder of thought or mood.      PHYSICAL EXAM:    Constitutional: Well developed and nourished  Eyes:Perrla  ENMT: normal  Neck:supple  Respiratory: + Wheezing posteriorly  Cardiovascular: S1 S2 regular  Gastrointestinal: Soft, + mildly tender   Extremities: No edema  Vascular:normal  Neurological:Awake, alert,Ox3  Musculoskeletal:Normal      MEDICATIONS  (STANDING):  ALBUTerol    90 MICROgram(s) HFA Inhaler 1 Puff(s) Inhalation every 6 hours  buDESOnide  80 MICROgram(s)/formoterol 4.5 MICROgram(s) Inhaler 2 Puff(s) Inhalation two times a day  cefTRIAXone   IVPB 1 Gram(s) IV Intermittent every 24 hours  dextrose 5% + sodium chloride 0.9%. 1000 milliLiter(s) (70 mL/Hr) IV Continuous <Continuous>  enoxaparin Injectable 40 milliGRAM(s) SubCutaneous daily  ergocalciferol 33021 Unit(s) Oral <User Schedule>  ferrous    sulfate 325 milliGRAM(s) Oral daily  melatonin 3 milliGRAM(s) Oral at bedtime  metroNIDAZOLE  IVPB 500 milliGRAM(s) IV Intermittent every 8 hours  pantoprazole    Tablet 40 milliGRAM(s) Oral before breakfast  sodium chloride 0.9% with potassium chloride 20 mEq/L 1000 milliLiter(s) (70 mL/Hr) IV Continuous <Continuous>  tamsulosin 0.4 milliGRAM(s) Oral at bedtime    MEDICATIONS  (PRN):  acetaminophen   Tablet .. 650 milliGRAM(s) Oral every 6 hours PRN Temp greater or equal to 38C (100.4F)  zolpidem 5 milliGRAM(s) Oral at bedtime PRN Insomnia      Allergies    Motrin (Swelling)    Intolerances        LABS:                        8.7    11.0  )-----------( 547      ( 17 Sep 2018 06:16 )             27.3     09-17    142  |  109<H>  |  8   ----------------------------<  116<H>  3.6   |  26  |  0.79    Ca    7.8<L>      17 Sep 2018 06:16  Phos  2.4     09-17  Mg     1.8     09-17                CAPILLARY BLOOD GLUCOSE            RADIOLOGY & ADDITIONAL TESTS:    CXR:  < from: Xray Chest 1 View AP/PA (09.09.18 @ 20:00) >    INTERPRETATION:  History: Upper abdominal pain    Technique:  AP portable    Comparisons:  none    Findings:     Subsegmental atelectasis and volume loss inleft lower   lobe.  . Right lung is clear. No pleural effusions.    The pulmonary   vasculature and aorta are normal for age. Heart size is unremarkable.     The thorax is normal for age.    Impression: Subsegmental atelectasis left lower lobe. Mild relative   elevation of left diaphragm      < end of copied text >    Ct scan chest:  < from: CT Abdomen and Pelvis w/ Oral Cont and w/ IV Cont (09.09.18 @ 22:18) >  FINDINGS:    LUNG BASES:  There are no pleural effusions. Cystic bronchiectasis at the   left lung base    < end of copied text >  < from: MR MRCP w/wo IV Cont (09.12.18 @ 19:33) >  Impression: The common bile duct measures up to 6 mm in caliber which is   within normal limits. No suspicious filling defect in the common bile   duct to suggest choledocholithiasis. The main pancreatic duct is not   dilated.     In the upper left kidney, there is a small area of striated   hypoenhancement, suggestive of pyelonephritis. Small nonenhancing foci   within this area of striated hypoenhancement in the upper left kidney   measuring up to 8 mm, suggestive of associated microabscesses. Follow-up   abdominal MR in 4-6 weeks may be pursued to ensure resolution or   improvement, and to rule out malignant neoplastic process.    < end of copied text >    ekg;    echo:  < from: US Renal (09.11.18 @ 11:59) >  IMPRESSION:  No hydronephrosis.       < end of copied text >

## 2018-09-17 NOTE — PROGRESS NOTE ADULT - NEGATIVE ENMT SYMPTOMS
no abnormal taste sensation/no ear pain/no hearing difficulty
no ear pain/no dry mouth/no dysphagia/no hearing difficulty/no gum bleeding/no throat pain/no nose bleeds
no hearing difficulty/no dry mouth/no dysphagia/no nose bleeds/no throat pain/no gum bleeding
no hearing difficulty/no nose bleeds/no dry mouth/no ear pain/no gum bleeding/no throat pain/no dysphagia

## 2018-09-17 NOTE — PROGRESS NOTE ADULT - NEGATIVE GENERAL GENITOURINARY SYMPTOMS
no incontinence/no dysuria/no hematuria
no hematuria/no renal colic
no renal colic/no hematuria/no dysuria
no renal colic/no nocturia/no hematuria

## 2018-09-17 NOTE — PROGRESS NOTE ADULT - NEGATIVE SKIN SYMPTOMS
no dryness/no itching/no rash
no rash/no dryness/no itching
no rash/no dryness/no itching
no rash/no itching/no dryness

## 2018-09-17 NOTE — PROGRESS NOTE ADULT - NEGATIVE NEUROLOGICAL SYMPTOMS
no headache/no tremors/no syncope/no vertigo
no syncope/no tremors/no headache/no vertigo
no syncope/no vertigo/no tremors/no headache
no syncope/no vertigo/no tremors/no headache

## 2018-09-17 NOTE — PROGRESS NOTE ADULT - RS GEN PE MLT RESP DETAILS PC
airway patent/good air movement/breath sounds equal/respirations non-labored
airway patent/breath sounds equal/respirations non-labored/good air movement
airway patent/breath sounds equal/respirations non-labored/good air movement
breath sounds equal/good air movement/airway patent

## 2018-09-17 NOTE — PROGRESS NOTE ADULT - PROBLEM SELECTOR PROBLEM 5
Kidney stone
Atelectasis
COPD (chronic obstructive pulmonary disease)

## 2018-09-17 NOTE — PROGRESS NOTE ADULT - ASSESSMENT
62 yo female with left pyelonephrosis with microabsesses, 2mm distal ureteral stone     - Cont IV antibx  - f/u Renal US tomorrow  - FU cultures  - cont flomax
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 She had lower abdominal pain on thursday morning which she initially thought that is from holding urine for long time or form constipation but it persisted through out day so she went to PCP next day who gave her cipro and fleet enema for constipation. Her pain was dull over right lower quadrant constant, 5/10 in intensity. After enema she had small bowel movement but it did not help her with pain and she also noted some nausea. Denies urinary problems, chest pain, dizziness, vomiting, SOB, BE.
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 She had lower abdominal pain on thursday morning which she initially thought that is from holding urine for long time or form constipation but it persisted through out day so she went to PCP next day who gave her cipro and fleet enema for constipation. Her pain was dull over right lower quadrant constant, 5/10 in intensity. After enema she had small bowel movement but it did not help her with pain and she also noted some nausea. Denies urinary problems, chest pain, dizziness, vomiting, SOB, BE.       In ED:  vitals: 129/63, 87, 98.7, 18(100)  labs: pertinent for WBC of 15.1 and Na of 133  UA positive   CT abdomen/pelvis: evidence of colitis and left pyelonephritis   stat dose of zosyn and levoflox
63y.o. Female with improving left pyelonephritis
63y.o. Female with improving left pyelonephritis
A/P 64 yo F with pyelonephritis, possible 2mm ureteral stone, no hydronephrosis, normal Cr    - Continue conservative management  - Treat pyelonephritis  - Follow-up as outpt. Call [phone] for appointment
abd pain, slightly improved since admission  no cvat  diarrhea, r/o c diff colitis  ct evidence of colitis, possible 2mm left ureteral stone,   r/o pyelonephritis with microabcesses    pt on iv abx  stool for c-diff colitis  observation,  will discuss with Urology attending, considering for ureteral stent placement if evidence of obstructing ureteral stone.
1. Abdominal pain (improving)  2. Kidney abscess  3. Colitis  4. R/o duodenal ulcer  5. Pyelonephritis    Suggestions:    1. Continue antibiotics  2. Protonix daily  3. Avoid NSAID  4. DVT prophylaxis  5. EGD
1. Abdominal pain  2. Colitis  3. Duodenal ulcer  4. Pyelonephritis  5. Ureteral stone    Suggestions:    1. Follow up strool study  2. Continue antibiotics  3. Monitor electrolytes  4. Urology follow up  5. Avoid NSAID  6. Protonix daily  7. EGD  8. DVT prophylaxis
1. Anemia  2. Gastric ulcer  3. Duodenal ulcer  4. Colitis  5. No evidence of acute GI bleeding at present time    Suggestions:    1. Protonix daily  2. Avoid NSAID  3. Follow up path  4. DVT prophylaxis  5. Colonoscopy out patient
1. Gastric ulcer  2. Duodenal ulcer  3. Colitis    Suggestions:    1. Protonix daily  2. Avoid NSAID  3. Follow up path  4. Colonoscopy out patient  5. DVT prophylaxis

## 2018-09-17 NOTE — PROGRESS NOTE ADULT - CVS HE PE MLT D E PC
regular rate and rhythm/no rub
no rub/regular rate and rhythm
no rub/regular rate and rhythm
regular rate and rhythm/no rub

## 2018-09-17 NOTE — PROGRESS NOTE ADULT - NEGATIVE MUSCULOSKELETAL SYMPTOMS
no stiffness/no neck pain/no arthralgia
no arthralgia/no muscle cramps/no stiffness/no neck pain
no muscle cramps/no stiffness/no arthralgia/no neck pain
no stiffness/no muscle cramps/no neck pain/no arthralgia

## 2018-09-18 LAB — SURGICAL PATHOLOGY STUDY: SIGNIFICANT CHANGE UP

## 2021-02-02 ENCOUNTER — INPATIENT (INPATIENT)
Facility: HOSPITAL | Age: 66
LOS: 1 days | Discharge: HOME | DRG: 305 | End: 2021-02-04
Attending: INTERNAL MEDICINE | Admitting: INTERNAL MEDICINE
Payer: COMMERCIAL

## 2021-02-02 VITALS
OXYGEN SATURATION: 97 % | WEIGHT: 108.03 LBS | RESPIRATION RATE: 20 BRPM | SYSTOLIC BLOOD PRESSURE: 173 MMHG | DIASTOLIC BLOOD PRESSURE: 121 MMHG | TEMPERATURE: 96 F | HEART RATE: 98 BPM

## 2021-02-02 DIAGNOSIS — Z98.890 OTHER SPECIFIED POSTPROCEDURAL STATES: Chronic | ICD-10-CM

## 2021-02-02 DIAGNOSIS — J84.10 PULMONARY FIBROSIS, UNSPECIFIED: ICD-10-CM

## 2021-02-02 DIAGNOSIS — Z90.710 ACQUIRED ABSENCE OF BOTH CERVIX AND UTERUS: Chronic | ICD-10-CM

## 2021-02-02 DIAGNOSIS — J44.9 CHRONIC OBSTRUCTIVE PULMONARY DISEASE, UNSPECIFIED: ICD-10-CM

## 2021-02-02 DIAGNOSIS — Z29.9 ENCOUNTER FOR PROPHYLACTIC MEASURES, UNSPECIFIED: ICD-10-CM

## 2021-02-02 DIAGNOSIS — R00.2 PALPITATIONS: ICD-10-CM

## 2021-02-02 DIAGNOSIS — I10 ESSENTIAL (PRIMARY) HYPERTENSION: ICD-10-CM

## 2021-02-02 DIAGNOSIS — R07.9 CHEST PAIN, UNSPECIFIED: ICD-10-CM

## 2021-02-02 LAB
A1C WITH ESTIMATED AVERAGE GLUCOSE RESULT: 5.8 % — HIGH (ref 4–5.6)
ALBUMIN SERPL ELPH-MCNC: 3.7 G/DL — SIGNIFICANT CHANGE UP (ref 3.5–5)
ALP SERPL-CCNC: 77 U/L — SIGNIFICANT CHANGE UP (ref 40–120)
ALT FLD-CCNC: 24 U/L DA — SIGNIFICANT CHANGE UP (ref 10–60)
ANION GAP SERPL CALC-SCNC: 6 MMOL/L — SIGNIFICANT CHANGE UP (ref 5–17)
APPEARANCE UR: CLEAR — SIGNIFICANT CHANGE UP
AST SERPL-CCNC: 22 U/L — SIGNIFICANT CHANGE UP (ref 10–40)
BACTERIA # UR AUTO: ABNORMAL /HPF
BASOPHILS # BLD AUTO: 0.03 K/UL — SIGNIFICANT CHANGE UP (ref 0–0.2)
BASOPHILS NFR BLD AUTO: 0.3 % — SIGNIFICANT CHANGE UP (ref 0–2)
BILIRUB SERPL-MCNC: 0.3 MG/DL — SIGNIFICANT CHANGE UP (ref 0.2–1.2)
BILIRUB UR-MCNC: NEGATIVE — SIGNIFICANT CHANGE UP
BUN SERPL-MCNC: 16 MG/DL — SIGNIFICANT CHANGE UP (ref 7–18)
CALCIUM SERPL-MCNC: 9.2 MG/DL — SIGNIFICANT CHANGE UP (ref 8.4–10.5)
CHLORIDE SERPL-SCNC: 103 MMOL/L — SIGNIFICANT CHANGE UP (ref 96–108)
CHOLEST SERPL-MCNC: 222 MG/DL — HIGH
CO2 SERPL-SCNC: 29 MMOL/L — SIGNIFICANT CHANGE UP (ref 22–31)
COLOR SPEC: YELLOW — SIGNIFICANT CHANGE UP
COMMENT - URINE: SIGNIFICANT CHANGE UP
CREAT SERPL-MCNC: 1.03 MG/DL — SIGNIFICANT CHANGE UP (ref 0.5–1.3)
DIFF PNL FLD: NEGATIVE — SIGNIFICANT CHANGE UP
EOSINOPHIL # BLD AUTO: 0.04 K/UL — SIGNIFICANT CHANGE UP (ref 0–0.5)
EOSINOPHIL NFR BLD AUTO: 0.4 % — SIGNIFICANT CHANGE UP (ref 0–6)
EPI CELLS # UR: SIGNIFICANT CHANGE UP /HPF
ESTIMATED AVERAGE GLUCOSE: 120 MG/DL — HIGH (ref 68–114)
GLUCOSE SERPL-MCNC: 101 MG/DL — HIGH (ref 70–99)
GLUCOSE UR QL: NEGATIVE — SIGNIFICANT CHANGE UP
HCT VFR BLD CALC: 38.5 % — SIGNIFICANT CHANGE UP (ref 34.5–45)
HDLC SERPL-MCNC: 109 MG/DL — SIGNIFICANT CHANGE UP
HGB BLD-MCNC: 12 G/DL — SIGNIFICANT CHANGE UP (ref 11.5–15.5)
IMM GRANULOCYTES NFR BLD AUTO: 0.3 % — SIGNIFICANT CHANGE UP (ref 0–1.5)
KETONES UR-MCNC: ABNORMAL
LEUKOCYTE ESTERASE UR-ACNC: ABNORMAL
LIDOCAIN IGE QN: 334 U/L — SIGNIFICANT CHANGE UP (ref 73–393)
LIPID PNL WITH DIRECT LDL SERPL: 95 MG/DL — SIGNIFICANT CHANGE UP
LYMPHOCYTES # BLD AUTO: 1.39 K/UL — SIGNIFICANT CHANGE UP (ref 1–3.3)
LYMPHOCYTES # BLD AUTO: 14.8 % — SIGNIFICANT CHANGE UP (ref 13–44)
MAGNESIUM SERPL-MCNC: 2 MG/DL — SIGNIFICANT CHANGE UP (ref 1.6–2.6)
MCHC RBC-ENTMCNC: 27.3 PG — SIGNIFICANT CHANGE UP (ref 27–34)
MCHC RBC-ENTMCNC: 31.2 GM/DL — LOW (ref 32–36)
MCV RBC AUTO: 87.7 FL — SIGNIFICANT CHANGE UP (ref 80–100)
MONOCYTES # BLD AUTO: 0.5 K/UL — SIGNIFICANT CHANGE UP (ref 0–0.9)
MONOCYTES NFR BLD AUTO: 5.3 % — SIGNIFICANT CHANGE UP (ref 2–14)
NEUTROPHILS # BLD AUTO: 7.39 K/UL — SIGNIFICANT CHANGE UP (ref 1.8–7.4)
NEUTROPHILS NFR BLD AUTO: 78.9 % — HIGH (ref 43–77)
NITRITE UR-MCNC: NEGATIVE — SIGNIFICANT CHANGE UP
NON HDL CHOLESTEROL: 113 MG/DL — SIGNIFICANT CHANGE UP
NRBC # BLD: 0 /100 WBCS — SIGNIFICANT CHANGE UP (ref 0–0)
NT-PROBNP SERPL-SCNC: 69 PG/ML — SIGNIFICANT CHANGE UP (ref 0–125)
PH UR: 7 — SIGNIFICANT CHANGE UP (ref 5–8)
PLATELET # BLD AUTO: 373 K/UL — SIGNIFICANT CHANGE UP (ref 150–400)
POTASSIUM SERPL-MCNC: 3.8 MMOL/L — SIGNIFICANT CHANGE UP (ref 3.5–5.3)
POTASSIUM SERPL-SCNC: 3.8 MMOL/L — SIGNIFICANT CHANGE UP (ref 3.5–5.3)
PROT SERPL-MCNC: 8.2 G/DL — SIGNIFICANT CHANGE UP (ref 6–8.3)
PROT UR-MCNC: NEGATIVE — SIGNIFICANT CHANGE UP
RBC # BLD: 4.39 M/UL — SIGNIFICANT CHANGE UP (ref 3.8–5.2)
RBC # FLD: 17.1 % — HIGH (ref 10.3–14.5)
RBC CASTS # UR COMP ASSIST: SIGNIFICANT CHANGE UP /HPF (ref 0–2)
SARS-COV-2 RNA SPEC QL NAA+PROBE: SIGNIFICANT CHANGE UP
SODIUM SERPL-SCNC: 138 MMOL/L — SIGNIFICANT CHANGE UP (ref 135–145)
SP GR SPEC: 1.01 — SIGNIFICANT CHANGE UP (ref 1.01–1.02)
TRIGL SERPL-MCNC: 90 MG/DL — SIGNIFICANT CHANGE UP
TROPONIN I SERPL-MCNC: <0.015 NG/ML — SIGNIFICANT CHANGE UP (ref 0–0.04)
TROPONIN I SERPL-MCNC: <0.015 NG/ML — SIGNIFICANT CHANGE UP (ref 0–0.04)
UROBILINOGEN FLD QL: NEGATIVE — SIGNIFICANT CHANGE UP
WBC # BLD: 9.38 K/UL — SIGNIFICANT CHANGE UP (ref 3.8–10.5)
WBC # FLD AUTO: 9.38 K/UL — SIGNIFICANT CHANGE UP (ref 3.8–10.5)
WBC UR QL: ABNORMAL /HPF (ref 0–5)

## 2021-02-02 PROCEDURE — 70498 CT ANGIOGRAPHY NECK: CPT | Mod: 26

## 2021-02-02 PROCEDURE — 70496 CT ANGIOGRAPHY HEAD: CPT | Mod: 26

## 2021-02-02 PROCEDURE — 99285 EMERGENCY DEPT VISIT HI MDM: CPT

## 2021-02-02 PROCEDURE — 71045 X-RAY EXAM CHEST 1 VIEW: CPT | Mod: 26

## 2021-02-02 RX ORDER — ENOXAPARIN SODIUM 100 MG/ML
30 INJECTION SUBCUTANEOUS DAILY
Refills: 0 | Status: DISCONTINUED | OUTPATIENT
Start: 2021-02-02 | End: 2021-02-04

## 2021-02-02 RX ORDER — BUDESONIDE AND FORMOTEROL FUMARATE DIHYDRATE 160; 4.5 UG/1; UG/1
2 AEROSOL RESPIRATORY (INHALATION)
Refills: 0 | Status: DISCONTINUED | OUTPATIENT
Start: 2021-02-02 | End: 2021-02-04

## 2021-02-02 RX ORDER — LABETALOL HCL 100 MG
10 TABLET ORAL ONCE
Refills: 0 | Status: DISCONTINUED | OUTPATIENT
Start: 2021-02-02 | End: 2021-02-02

## 2021-02-02 RX ORDER — LOSARTAN POTASSIUM 100 MG/1
25 TABLET, FILM COATED ORAL DAILY
Refills: 0 | Status: DISCONTINUED | OUTPATIENT
Start: 2021-02-02 | End: 2021-02-04

## 2021-02-02 RX ORDER — ALBUTEROL 90 UG/1
2 AEROSOL, METERED ORAL EVERY 6 HOURS
Refills: 0 | Status: DISCONTINUED | OUTPATIENT
Start: 2021-02-02 | End: 2021-02-04

## 2021-02-02 RX ORDER — PANTOPRAZOLE SODIUM 20 MG/1
40 TABLET, DELAYED RELEASE ORAL
Refills: 0 | Status: DISCONTINUED | OUTPATIENT
Start: 2021-02-02 | End: 2021-02-04

## 2021-02-02 RX ADMIN — Medication 40 MILLIGRAM(S): at 21:45

## 2021-02-02 RX ADMIN — LOSARTAN POTASSIUM 25 MILLIGRAM(S): 100 TABLET, FILM COATED ORAL at 21:45

## 2021-02-02 NOTE — H&P ADULT - ASSESSMENT
Patient, a 66 year old female with PMH of COPD and 6th nerve palsy presents to the ED with complaints of high blood pressure and palpitations this morning. CTA head and neck was negative. CXR showed calcified granulomas.

## 2021-02-02 NOTE — ED ADULT NURSE REASSESSMENT NOTE - NS ED NURSE REASSESS COMMENT FT1
Pt. is in stable condition. No complaints voiced. Pt. is on remote tele monitor with normal sinus rhythm. Endorsed to ED hold for close observation and continued care.

## 2021-02-02 NOTE — H&P ADULT - PROBLEM SELECTOR PLAN 2
patient also c/o palpitations and remote h/o CP   EKG was sinus rhythm   trop x 1 negative  admit to tele  trend troponins  f/u echo  Cardio Dr Hutchins

## 2021-02-02 NOTE — ED PROVIDER NOTE - NS ED ATTENDING STATEMENT MOD
PT DAILY TREATMENT NOTE/KNEE EVAL 3-16    Patient Name: Ian Pelletier  Date:2017  : 1971  [x]  Patient  Verified  Payor: BLUE OLIV[employer] / Plan: Wilian Borden [id number] PPO / Product Type: PPO /    In time:1103  Out time:1143  Total Treatment Time (min): 40  Visit #: 1 of 12    Treatment Area: There are no admission diagnoses documented for this encounter. SUBJECTIVE  Pain Level (0-10 scale): 4  []constant []intermittent []improving []worsening []no change since onset    Any medication changes, allergies to medications, adverse drug reactions, diagnosis change, or new procedure performed?: [x] No    [] Yes (see summary sheet for update)  Subjective functional status/changes:       Pain:  _9__/10 max       __4_/10 min     __4_/10 now    Location: anterior knee     [X] Sharp    [ ] Justin Colace [ ] Cheng Conde [ Lorel Prayer [ ] Leilani.Poplar [ ] Darryle Bleacher [ ] Other:        [X]  Aminta.Birch [ ] Intermittent        Weakness/Popping/clicking/giving way- denies  Numbness/tingling? Tingling on occasion  Pain at night- waking nightly     Social/Recreation       Hobbies-    denies                Aggravating factors- standing (10 minutes, but 3-4 hours gets in tolerable), step to pattern, walking for errands, bending/lifting at work      PLOF: pain with work, ADLs, walking/standing for last 5 years; works at [employer] 8-9 hours daily  Limitations to PLOF: pain  Mechanism of Injury: Pt reports ese knee >5 years with gradual worsening. Pt does not recall mechanism of injury. Pt saw MD and left>right, bad OA confirmed by XRAY. Trial PT but possible surgery in future. PT in past, 2 years ago with little relief. Pt uses [facility] on occasion. Pt reports 1 year ago fell down steps and increased pain and swelling-- unclear of how fell.     Current symptoms/Complaints: pain  Previous Treatment/Compliance: PT 2 years ago, denies surgeries or injections  PMHx/Surgical Hx: denies  Work Hx: alexy-- standing, walking  Living Situation: lives with son, 24 steps to enter home  Pt Goals: \"My biggest goal is to be able to go through a day and be able to manage with pain is minimal.  My goal is to not have to take my meds. \"  Barriers: []pain []financial []time []transportation []other  Motivation: fair  Substance use: []Alcohol []Tobacco []other:   FABQ Score: []low []elevate  Cognition: A & O x 4    Other:    OBJECTIVE/EXAMINATION    24 min [x]Eval                  []Re-Eval       8 min Therapeutic Exercise:  [x] See flow sheet : HEP   Rationale: increase ROM, increase strength, improve coordination, improve balance and increase proprioception to improve the patients ability to perform ADL's with reduced pain levels. 8 min Therapeutic Activity:  []  See flow sheet : Discussed and reviewed diagnosis, prognosis, POC   Rationale: increase ROM, increase strength, improve coordination, improve balance and increase proprioception  to improve the patients ability to perform ADL's with reduced pain levels.         With   [] TE   [] TA   [] neuro   [] other: Patient Education: [x] Review HEP    [] Progressed/Changed HEP based on:   [] positioning   [] body mechanics   [] transfers   [] heat/ice application    [] other:      Physical Therapy Evaluation - Knee    Posture: [] Varus    [] Valgus    [] Recurvatum        [] Tibial Torsion    [] Foot Supination    [] Foot Pronation    Describe:    Gait:  [] Normal    [] Abnormal    [] Antalgic    [] NWB    Device:    Describe:    ROM / Strength  [] Unable to assess                  AROM                      PROM                   Strength (1-5)    Left Right Left Right Left Right   Hip Flexion     4 pain  4 pain    Extension     3+ 3+    Abduction     4- 4    Adduction     4 4   Knee Flexion 115 113   4  4- in prone 4  4- in prone    Extension Hyper 1 2   4 4   Ankle Plantarflexion     5 5    Dorsiflexion     5 5       Flexibility: [] Unable to assess at this time  Hamstrings:    (L) Tightness= [] WNL   [x] Min   [] Mod   [] Severe    (R) Tightness= [] WNL   [x] Min   [] Mod   [] Severe  Quadriceps:    (L) Tightness= [] WNL   [] Min   [] Mod   [] Severe    (R) Tightness= [] WNL   [] Min   [] Mod   [] Severe  Gastroc:      (L) Tightness= [] WNL   [x] Min   [] Mod   [] Severe    (R) Tightness= [] WNL   [x] Min   [] Mod   [] Severe  Other:    Palpation:   Neg/Pos  Neg/Pos  Neg/Pos   Joint Line Pos ese Quad tendon neg Patellar ligament neg   Patella  Fibular head neg Pes Anserinus    Tibial tubercle neg Hamstring tendons  Infrapatellar fat pad      Lachmans  [] Neg    [] Pos Posterior Drawer [x] Neg    [] Pos  Pivot Shift  [] Neg    [] Pos Posterior Sag  [x] Neg    [] Pos  NATY   [] Neg    [] Pos Edinson's Test [] Neg    [] Pos  ALRI   [] Neg    [] Pos Squat   [] Neg    [] Pos  Valgus@ 0 Degrees [x] Neg    [] Pos Tate-Abiodun [x] Neg    [] Pos  Valgus@ 30 Degrees [] Neg    [] Pos Patellar Apprehension [] Neg    [] Pos  Varus@ 0 Degrees [x] Neg    [] Pos Alas's Compression [] Neg    [] Pos  Varus@ 30 Degrees [] Neg    [] Pos Ely's Test  [] Neg    [] Pos  Apley's Compression [] Neg    [] Pos Светлана's Test  [] Neg    [] Pos  Apley's Distraction [] Neg    [] Pos Stroke Test  [] Neg    [] Pos   Anterior Drawer [x] Neg    [] Pos Fluctuation Test [] Neg    [] Pos  Other:                  [] Neg    [] Pos                 Other tests/comments: good quad set  Poor patellar mobility in all directions  SLS: 5 errors in 25 sec on right LE, 6+ errors in 16 sec on left       Pain Level (0-10 scale) post treatment: 3    ASSESSMENT/Changes in Function: Pt is a 39 y.o. female presenting to therapy with bile knee and diagnosis of OA per MD script. Pt pain began >5 years ago, worsening following fall down the stairs 1 year ago. Pt impairments include limited patellar mobility, decreased knee AROM decreased hip strength, altered gait and balance.   Pt functional limitations include pain with stairs, walking, standing, bending and lifting for work. Pt treatment diagnosis consistent with ese knee OA. Pt would benefit from skilled PT to improve impairments listed above and improve function. Patient will continue to benefit from skilled PT services to modify and progress therapeutic interventions, address functional mobility deficits, address ROM deficits, address strength deficits, analyze and address soft tissue restrictions, analyze and cue movement patterns, analyze and modify body mechanics/ergonomics, assess and modify postural abnormalities, address imbalance/dizziness and instruct in home and community integration to attain remaining goals. [x]  See Plan of Care  []  See progress note/recertification  []  See Discharge Summary         Progress towards goals / Updated goals:  Short Term Goals: STG- To be accomplished in 2 week(s):  1. Pt will be independent with HEP to encourage prophylaxis. Eval:dispensed  Current: NA    Long Term Goals: LTG- To be accomplished in 6 week(s):  1. Pt will demonstrate good patellar mobility in all directions to allow to to bend and lift with less pain at work. Eval:poor mobility in all directions ese  Current: NA    2. Pt will be able to perform SLS for 30 sec ese on floor with no error to improve community ambulation for errands. Eval:SLS: 5 errors in 25 sec on right LE, 6+ errors in 16 sec on left  Current: NA    3. Pt hip strength will improve to 4+/5 or greater to allow pt to go up and down stairs with no more than 3/10 pain to improve home access. Eval:[] Unable to assess Strength (1-5)      Left Right   Hip Flexion 4 pain  4 pain     Extension 3+ 3+     Abduction 4- 4     Adduction 4 4   Knee Flexion 4  4- in prone 4  4- in prone     Extension 4 4   Ankle Plantarflexion 5 5     Dorsiflexion 5 5       Current: NA    4. Pt FOTO score will increase by >10 points to show improvement in ese knee function.   Eval:35  Current: will address at visit 5    PLAN  [x]  Upgrade activities as tolerated     []  Continue plan of care  []  Update interventions per flow sheet       []  Discharge due to:_  []  Other:_      Rosina Gutierrez, PT 4/24/2017  11:05 AM Attending Only

## 2021-02-02 NOTE — CONSULT NOTE ADULT - SUBJECTIVE AND OBJECTIVE BOX
Time of visit:    CHIEF COMPLAINT: Patient is a 66y old  Female who presents with a chief complaint of     HPI:   Patient seen and examined.     PAST MEDICAL & SURGICAL HISTORY:  COPD (chronic obstructive pulmonary disease)    S/P thyroid surgery    S/P RANDOLPH-BSO        Allergies    Motrin (Swelling)    Intolerances        MEDICATIONS  (STANDING):      MEDICATIONS  (PRN):   Medications up to date at time of exam.    Medications up to date at time of exam.    FAMILY HISTORY:  No pertinent family history in first degree relatives        SOCIAL HISTORY  Smoking History: [   ] smoking/smoke exposure, [   ] former smoker  Living Condition: [   ] apartment, [   ] private house  Work History:   Travel History: denies recent travel  Illicit Substance Use: denies  Alcohol Use: denies    REVIEW OF SYSTEMS:    CONSTITUTIONAL:  denies fevers, chills, sweats, weight loss    HEENT:  denies diplopia or blurred vision, sore throat or runny nose.    CARDIOVASCULAR:  denies pressure, squeezing, tightness, or heaviness about the chest; no palpitations.    RESPIRATORY:  denies SOB, cough, BE, wheezing.    GASTROINTESTINAL:  denies abdominal pain, nausea, vomiting or diarrhea.    GENITOURINARY: denies dysuria, frequency or urgency.    NEUROLOGIC:  denies numbness, tingling, seizures or weakness.    PSYCHIATRIC:  denies disorder of thought or mood.    MSK: denies swelling, redness      PHYSICAL EXAMINATION:    GENERAL: The patient is a well-developed, well-nourished, in no apparent distress.     Vital Signs Last 24 Hrs  T(C): 37.4 (2021 15:24), Max: 37.4 (2021 15:24)  T(F): 99.3 (2021 15:24), Max: 99.3 (2021 15:24)  HR: 82 (2021 15:24) (82 - 98)  BP: 177/81 (2021 15:24) (173/121 - 177/81)  BP(mean): --  RR: 20 (2021 15:24) (20 - 20)  SpO2: 96% (2021 15:24) (96% - 97%)   (if applicable)    Chest Tube (if applicable)    HEENT: Head is normocephalic and atraumatic. Extraocular muscles are intact. Mucous membranes are moist.     NECK: Supple, no palpable adenopathy.    LUNGS: Clear to auscultation, no wheezing, rales, or rhonchi.    HEART: Regular rate and rhythm without murmur.    ABDOMEN: Soft, nontender, and nondistended.  No hepatosplenomegaly is noted.    RENAL: No difficulty voiding, no pelvic pain    EXTREMITIES: Without any cyanosis, clubbing, rash, lesions or edema.    NEUROLOGIC: Awake, alert, oriented, grossly intact    SKIN: Warm, dry, good turgor.      LABS:                        12.0   9.38  )-----------( 373      ( 2021 12:30 )             38.5         138  |  103  |  16  ----------------------------<  101<H>  3.8   |  29  |  1.03    Ca    9.2      2021 12:30  Mg     2.0         TPro  8.2  /  Alb  3.7  /  TBili  0.3  /  DBili  x   /  AST  22  /  ALT  24  /  AlkPhos  77  02-      Urinalysis Basic - ( 2021 14:30 )    Color: Yellow / Appearance: Clear / S.010 / pH: x  Gluc: x / Ketone: Trace  / Bili: Negative / Urobili: Negative   Blood: x / Protein: Negative / Nitrite: Negative   Leuk Esterase: Small / RBC: 0-2 /HPF / WBC 6-10 /HPF   Sq Epi: x / Non Sq Epi: Few /HPF / Bacteria: Few /HPF        CARDIAC MARKERS ( 2021 12:30 )  <0.015 ng/mL / x     / x     / x     / x            Serum Pro-Brain Natriuretic Peptide: 69 pg/mL (21 @ 12:30)          MICROBIOLOGY: (if applicable)    RADIOLOGY & ADDITIONAL STUDIES:  EKG:   CXR:  ECHO:    IMPRESSION: 66y Female PAST MEDICAL & SURGICAL HISTORY:  COPD (chronic obstructive pulmonary disease)    S/P thyroid surgery    S/P RANDOLPH-BSO     p/w                   RECOMMENDATIONS:   Time of visit:    CHIEF COMPLAINT: Patient is a 66y old  Female who presents with a chief complaint of     HPI:   Patient, a 66 year old female with PMH of COPD and 6th nerve palsy presents to the ED with complaints of high blood pressure and palpitations this morning. Patient states she has been feeling drowsy after eating. Yesterday, she checked her bp which was in 200s and later she repeated using her friend's bp machine and it was still elevated. She took her friend's amlodipine and losartan. This morning when she woke up, blood pressure was 190/100.  She also reports vague symptoms of dizziness and palpitations. Patient states she has double vision and thought her symptoms were due to her 6th nerve palsy She says she had left sided chest pain more than a week ago that went away on its own and was not related to exertion, attributes to gas.No history of COVID or travel. No h/o CVA or CAD. Denies , weakness, fever, chills, cough or any other complaints.     In ED,  Vital Signs Last 24 Hrs  T(C): 36.4 (2021 19:20), Max: 37.4 (2021 15:24)  T(F): 97.6 (2021 19:20), Max: 99.3 (2021 15:24)  HR: 70 (2021 19:20) (70 - 98)  BP: 164/78 (2021 19:20) (164/78 - 177/81)  BP(mean): --  RR: 20 (2021 19:20) (20 - 20)  SpO2: 98% (2021 19:20) (96% - 98%)      PAST MEDICAL & SURGICAL HISTORY:  COPD (chronic obstructive pulmonary disease)    S/P thyroid surgery    S/P RANDOLPH-BSO        Allergies    Motrin (Swelling)    Intolerances        MEDICATIONS  (STANDING):      MEDICATIONS  (PRN):   Medications up to date at time of exam.    Medications up to date at time of exam.    FAMILY HISTORY:  No pertinent family history in first degree relatives        SOCIAL HISTORY  Smoking History: [ x  ]  non smoking/smoke exposure, [   ] former smoker  Living Condition: [   ] apartment, [   ] private house  Work History:  retired NICU nurse  Travel History: denies recent travel  Illicit Substance Use: denies  Alcohol Use: denies    REVIEW OF SYSTEMS:    CONSTITUTIONAL:  denies fevers, chills, sweats, weight loss    HEENT:  denies diplopia or blurred vision, sore throat or runny nose.    CARDIOVASCULAR:  denies pressure, squeezing, tightness, or heaviness about the chest; no palpitations.    RESPIRATORY:  episodes of SOB, cough, BE, wheezing.    GASTROINTESTINAL:  denies abdominal pain, nausea, vomiting or diarrhea.    GENITOURINARY: denies dysuria, frequency or urgency.    NEUROLOGIC:  denies numbness, tingling, seizures or weakness.    PSYCHIATRIC:  denies disorder of thought or mood.    MSK: denies swelling, redness      PHYSICAL EXAMINATION:    GENERAL: The patient is a well-developed, well-nourished, in no apparent distress.     Vital Signs Last 24 Hrs  T(C): 37.4 (2021 15:24), Max: 37.4 (2021 15:24)  T(F): 99.3 (2021 15:24), Max: 99.3 (2021 15:24)  HR: 82 (2021 15:24) (82 - 98)  BP: 177/81 (2021 15:24) (173/121 - 177/81)  BP(mean): --  RR: 20 (2021 15:24) (20 - 20)  SpO2: 96% (2021 15:24) (96% - 97%)   (if applicable)    Chest Tube (if applicable)    HEENT: Head is normocephalic and atraumatic. Extraocular muscles are intact. Mucous membranes are moist.     NECK: Supple, no palpable adenopathy.    LUNGS: fair b/l breath sounds with b/l end exp wheezing     HEART: Regular rate and rhythm without murmur.    ABDOMEN: Soft, nontender, and nondistended.  No hepatosplenomegaly is noted.    RENAL: No difficulty voiding, no pelvic pain    EXTREMITIES: Without any cyanosis, clubbing, rash, lesions or edema.    NEUROLOGIC: Awake, alert, oriented, grossly intact    SKIN: Warm, dry, good turgor.      LABS:                        12.0   9.38  )-----------( 373      ( 2021 12:30 )             38.5     02-    138  |  103  |  16  ----------------------------<  101<H>  3.8   |  29  |  1.03    Ca    9.2      2021 12:30  Mg     2.0         TPro  8.2  /  Alb  3.7  /  TBili  0.3  /  DBili  x   /  AST  22  /  ALT  24  /  AlkPhos  77        Urinalysis Basic - ( 2021 14:30 )    Color: Yellow / Appearance: Clear / S.010 / pH: x  Gluc: x / Ketone: Trace  / Bili: Negative / Urobili: Negative   Blood: x / Protein: Negative / Nitrite: Negative   Leuk Esterase: Small / RBC: 0-2 /HPF / WBC 6-10 /HPF   Sq Epi: x / Non Sq Epi: Few /HPF / Bacteria: Few /HPF        CARDIAC MARKERS ( 2021 12:30 )  <0.015 ng/mL / x     / x     / x     / x            Serum Pro-Brain Natriuretic Peptide: 69 pg/mL (21 @ 12:30)          MICROBIOLOGY: (if applicable)    RADIOLOGY & ADDITIONAL STUDIES:  EKG:   CXR:< from: Xray Chest 1 View- PORTABLE-Urgent (21 @ 12:26) >    EXAM:  XR CHEST PORTABLE URGENT 1V                            PROCEDURE DATE:  2021          INTERPRETATION:  CLINICAL STATEMENT: Chest pain.    TECHNIQUE: AP view of the chest.    COMPARISON: 2018    FINDINGS/  IMPRESSION:  Calcified granulomas. No consolidation. Trace left pleural effusion    Heart size within normal limits.              REESE BELTRÁN MD; Attending Radiologist  This document has been electronically signed. 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 Pat noted to have wheezes b/l. HLD    Sugg;  -start antihypertensive meds  -statin   -solumedrol   -albuterol inhaler  -CT chest  - mg /day x 9 months   -Vit B6 50 mg /day  -monitor LFT weekly  -CAD work up  -out pat pulmonary f/u  -dvt/gi prophy

## 2021-02-02 NOTE — ED PROVIDER NOTE - CLINICAL SUMMARY MEDICAL DECISION MAKING FREE TEXT BOX
Darkened room/Patient informed/Family informed/Warm blanket
67 y/o F pt presents with HTN, numbness to tongue and palpitations. Symptoms may suggest CVA or acs among other causes. Will perform CT head, CXR, labs, COVID swab and reassess.

## 2021-02-02 NOTE — H&P ADULT - HISTORY OF PRESENT ILLNESS
Patient, a 66 year old female with PMH of COPD and 6th nerve palsy presents to the ED with complaints of high blood pressure and palpitations this morning. Patient states she has been feeling drowsy after eating. Yesterday, she checked her bp which was in 200s and later she repeated using her friend's bp machine and it was still elevated. She took her friend's amlodipine and losartan. This morning when she woke up, blood pressure was 190/100.  She also reports vague symptoms of dizziness and palpitations. Patient states she has double vision and thought her symptoms were due to her 6th nerve palsy She says she had left sided chest pain more than a week ago that went away on its own and was not related to exertion, attributes to gas.No history of COVID or travel. No h/o CVA or CAD. Denies , weakness, fever, chills, cough or any other complaints.     In ED,  Vital Signs Last 24 Hrs  T(C): 36.4 (02 Feb 2021 19:20), Max: 37.4 (02 Feb 2021 15:24)  T(F): 97.6 (02 Feb 2021 19:20), Max: 99.3 (02 Feb 2021 15:24)  HR: 70 (02 Feb 2021 19:20) (70 - 98)  BP: 164/78 (02 Feb 2021 19:20) (164/78 - 177/81)  BP(mean): --  RR: 20 (02 Feb 2021 19:20) (20 - 20)  SpO2: 98% (02 Feb 2021 19:20) (96% - 98%)    GOC: Full code

## 2021-02-02 NOTE — H&P ADULT - NSHPPHYSICALEXAM_GEN_ALL_CORE
PHYSICAL EXAM:  GENERAL: NAD, lying in bed comfortably  HEAD:  Atraumatic, Normocephalic  EYES: EOMI, PERRLA, conjunctiva and sclera clear  ENT: Moist mucous membranes  NECK: Supple, No JVD  CHEST/LUNG: +wheezing.  Unlabored respirations  HEART: Regular rate and rhythm; No murmurs, rubs, or gallops  ABDOMEN: Bowel sounds present; Soft, Nontender, Nondistended.   EXTREMITIES:  2+ Peripheral Pulses, brisk capillary refill. No clubbing, cyanosis, or edema  NERVOUS SYSTEM:  Alert & Oriented X3, speech clear. No deficits   MSK: FROM all 4 extremities, full and equal strength  SKIN: No rashes or lesions

## 2021-02-02 NOTE — ED PROVIDER NOTE - PROGRESS NOTE DETAILS
Pt still feels chest pressure and dizziness. Still hypertensive. Denies HA. Will give IV Labetalol, admit to tele. D/w Dr. NARAYAN Mcnally will admit to Dr. NIALL Talbert.

## 2021-02-02 NOTE — ED PROVIDER NOTE - NS ED MD DISPO ISOLATION TYPES
Implemented All Universal Safety Interventions:  [place] to call system. Call bell, personal items and telephone within reach. Instruct patient to call for assistance. Room bathroom lighting operational. Non-slip footwear when patient is off stretcher. Physically safe environment: no spills, clutter or unnecessary equipment. Stretcher in lowest position, wheels locked, appropriate side rails in place. None

## 2021-02-02 NOTE — ED ADULT NURSE NOTE - OBJECTIVE STATEMENT
Pt. c/o dizziness with high blood pressure and some chest tightness. Pt. stated after eating felt tired and dizzy so took BP and it was high.

## 2021-02-02 NOTE — H&P ADULT - PROBLEM SELECTOR PLAN 1
patient states her SBP was in 200s at home  denies prior history of HTN and on no meds  BP on admission 173/121  started on losartan 25mg for now  Cardio consulted Dr Hutchins

## 2021-02-02 NOTE — ED ADULT NURSE NOTE - EXTENSIONS OF SELF_ADULT
None Pt called with Claim # [id number] . Would like to have his CT  On Monday 6/15 in the afternoon if possible . [phone]
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
## 2021-02-03 LAB
A1C WITH ESTIMATED AVERAGE GLUCOSE RESULT: 5.8 % — HIGH (ref 4–5.6)
ALBUMIN SERPL ELPH-MCNC: 3.4 G/DL — LOW (ref 3.5–5)
ALP SERPL-CCNC: 72 U/L — SIGNIFICANT CHANGE UP (ref 40–120)
ALT FLD-CCNC: 23 U/L DA — SIGNIFICANT CHANGE UP (ref 10–60)
ANION GAP SERPL CALC-SCNC: 8 MMOL/L — SIGNIFICANT CHANGE UP (ref 5–17)
AST SERPL-CCNC: 18 U/L — SIGNIFICANT CHANGE UP (ref 10–40)
BILIRUB SERPL-MCNC: 0.5 MG/DL — SIGNIFICANT CHANGE UP (ref 0.2–1.2)
BUN SERPL-MCNC: 25 MG/DL — HIGH (ref 7–18)
CALCIUM SERPL-MCNC: 9.1 MG/DL — SIGNIFICANT CHANGE UP (ref 8.4–10.5)
CHLORIDE SERPL-SCNC: 104 MMOL/L — SIGNIFICANT CHANGE UP (ref 96–108)
CHOLEST SERPL-MCNC: 208 MG/DL — HIGH
CO2 SERPL-SCNC: 26 MMOL/L — SIGNIFICANT CHANGE UP (ref 22–31)
CREAT SERPL-MCNC: 1.3 MG/DL — SIGNIFICANT CHANGE UP (ref 0.5–1.3)
CRP SERPL-MCNC: 0.19 MG/DL — SIGNIFICANT CHANGE UP (ref 0–0.4)
ESTIMATED AVERAGE GLUCOSE: 120 MG/DL — HIGH (ref 68–114)
FOLATE SERPL-MCNC: >20 NG/ML — SIGNIFICANT CHANGE UP
GLUCOSE SERPL-MCNC: 123 MG/DL — HIGH (ref 70–99)
HCT VFR BLD CALC: 38.3 % — SIGNIFICANT CHANGE UP (ref 34.5–45)
HCV AB S/CO SERPL IA: 0.13 S/CO — SIGNIFICANT CHANGE UP (ref 0–0.99)
HCV AB SERPL-IMP: SIGNIFICANT CHANGE UP
HDLC SERPL-MCNC: 110 MG/DL — SIGNIFICANT CHANGE UP
HGB BLD-MCNC: 12 G/DL — SIGNIFICANT CHANGE UP (ref 11.5–15.5)
LIPID PNL WITH DIRECT LDL SERPL: 86 MG/DL — SIGNIFICANT CHANGE UP
MAGNESIUM SERPL-MCNC: 2.5 MG/DL — SIGNIFICANT CHANGE UP (ref 1.6–2.6)
MCHC RBC-ENTMCNC: 27.5 PG — SIGNIFICANT CHANGE UP (ref 27–34)
MCHC RBC-ENTMCNC: 31.3 GM/DL — LOW (ref 32–36)
MCV RBC AUTO: 87.6 FL — SIGNIFICANT CHANGE UP (ref 80–100)
NON HDL CHOLESTEROL: 98 MG/DL — SIGNIFICANT CHANGE UP
NRBC # BLD: 0 /100 WBCS — SIGNIFICANT CHANGE UP (ref 0–0)
PHOSPHATE SERPL-MCNC: 2.6 MG/DL — SIGNIFICANT CHANGE UP (ref 2.5–4.5)
PLATELET # BLD AUTO: 386 K/UL — SIGNIFICANT CHANGE UP (ref 150–400)
POTASSIUM SERPL-MCNC: 4 MMOL/L — SIGNIFICANT CHANGE UP (ref 3.5–5.3)
POTASSIUM SERPL-SCNC: 4 MMOL/L — SIGNIFICANT CHANGE UP (ref 3.5–5.3)
PROT SERPL-MCNC: 7.9 G/DL — SIGNIFICANT CHANGE UP (ref 6–8.3)
RBC # BLD: 4.37 M/UL — SIGNIFICANT CHANGE UP (ref 3.8–5.2)
RBC # FLD: 17.2 % — HIGH (ref 10.3–14.5)
SARS-COV-2 IGG SERPL QL IA: NEGATIVE — SIGNIFICANT CHANGE UP
SARS-COV-2 IGM SERPL IA-ACNC: 0.23 INDEX — SIGNIFICANT CHANGE UP
SODIUM SERPL-SCNC: 138 MMOL/L — SIGNIFICANT CHANGE UP (ref 135–145)
TRIGL SERPL-MCNC: 58 MG/DL — SIGNIFICANT CHANGE UP
TSH SERPL-MCNC: 1.98 UU/ML — SIGNIFICANT CHANGE UP (ref 0.34–4.82)
VIT B12 SERPL-MCNC: 887 PG/ML — SIGNIFICANT CHANGE UP (ref 232–1245)
WBC # BLD: 5.64 K/UL — SIGNIFICANT CHANGE UP (ref 3.8–10.5)
WBC # FLD AUTO: 5.64 K/UL — SIGNIFICANT CHANGE UP (ref 3.8–10.5)

## 2021-02-03 PROCEDURE — 71250 CT THORAX DX C-: CPT | Mod: 26

## 2021-02-03 RX ADMIN — BUDESONIDE AND FORMOTEROL FUMARATE DIHYDRATE 2 PUFF(S): 160; 4.5 AEROSOL RESPIRATORY (INHALATION) at 13:16

## 2021-02-03 RX ADMIN — BUDESONIDE AND FORMOTEROL FUMARATE DIHYDRATE 2 PUFF(S): 160; 4.5 AEROSOL RESPIRATORY (INHALATION) at 22:32

## 2021-02-03 RX ADMIN — ENOXAPARIN SODIUM 30 MILLIGRAM(S): 100 INJECTION SUBCUTANEOUS at 13:16

## 2021-02-03 RX ADMIN — PANTOPRAZOLE SODIUM 40 MILLIGRAM(S): 20 TABLET, DELAYED RELEASE ORAL at 06:34

## 2021-02-03 RX ADMIN — Medication 40 MILLIGRAM(S): at 22:31

## 2021-02-03 RX ADMIN — Medication 40 MILLIGRAM(S): at 13:16

## 2021-02-03 RX ADMIN — Medication 40 MILLIGRAM(S): at 06:34

## 2021-02-03 NOTE — PROGRESS NOTE ADULT - ASSESSMENT
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 if NST/ECHO normal, D/C planning home, F/U cardiology/pulmonary out patient.

## 2021-02-03 NOTE — DISCHARGE NOTE PROVIDER - PROVIDER TOKENS
FREE:[LAST:[Alex],FIRST:[Dada],PHONE:[(   )    -],FAX:[(   )    -],ADDRESS:[Please call to schedule an appointment]] FREE:[LAST:[Alex],FIRST:[Dada],PHONE:[(   )    -],FAX:[(   )    -],ADDRESS:[Please call to schedule an appointment]],PROVIDER:[TOKEN:[[id number]],FOLLOWUP:[1 week]]

## 2021-02-03 NOTE — ED CLERICAL - NS ED CLERK UNITS
502c/5SOU Consent (Near Eyelid Margin)/Introductory Paragraph: The rationale for Mohs was explained to the patient and consent was obtained. The risks, benefits and alternatives to therapy were discussed in detail. Specifically, the risks of ectropion or eyelid deformity, infection, scarring, bleeding, prolonged wound healing, incomplete removal, allergy to anesthesia, nerve injury and recurrence were addressed. Prior to the procedure, the treatment site was clearly identified and confirmed by the patient. All components of Universal Protocol/PAUSE Rule completed.

## 2021-02-03 NOTE — DISCHARGE NOTE PROVIDER - NSDCCPCAREPLAN_GEN_ALL_CORE_FT
PRINCIPAL DISCHARGE DIAGNOSIS  Diagnosis: Chest pain  Assessment and Plan of Treatment: You presented to the hospital complaining of chest pain.   EKG showed normal sinus rhythm. You were amitted to telemetry unit, no arrhythmia was seen on Tele monitor. Your serial cardiac enzymes were negative for Heart attack. Your Echocardiogram did not show any significant valvular pathology. Your stress test was negative for ischemia. You were seen by cardiologist, recommends to follow up as outpatient in a week.  66 year old female with PMH of COPD and 6th nerve palsy presented to the ED c/o high blood pressure, palpitations, left sided chest pain x1 week and dizziness. Patient admitted for uncontrolled hypertension and palpitations  EKG NSR, LVH, troponin negative, Echo showed traced MR, hyperdynamic LV fx >70%, GIDD, stress test no ischemia, CTA head and neck no stenosis. Patient was seen by cardiologist    CT chest Scattered tree-in-bud nodules consistent with small airways inflammation/infection; question KOBE infection. Patient was seen by pulmonologist recommends INH and vitamin B6 for 9 months.   Patients TSH 1.9, A1c 5.8 prediabetic, vitamin B12 and folate normal, LFTs normal, UA -ve, COVID19 -ve      SECONDARY DISCHARGE DIAGNOSES  Diagnosis: COPD (chronic obstructive pulmonary disease)  Assessment and Plan of Treatment: COPD (chronic obstructive pulmonary disease)    Diagnosis: Lung granuloma  Assessment and Plan of Treatment: Lung granuloma    Diagnosis: Uncontrolled hypertension  Assessment and Plan of Treatment:      PRINCIPAL DISCHARGE DIAGNOSIS  Diagnosis: Chest pain  Assessment and Plan of Treatment: You presented to the hospital complaining of chest pain.   EKG showed normal sinus rhythm. You were amitted to telemetry unit, no arrhythmia was seen on Tele monitor. Your serial cardiac enzymes were negative for Heart attack. Your Echocardiogram did not show any significant valvular pathology. Your stress test was negative for ischemia. You were seen by cardiologist, recommends to follow up as outpatient in a week from discharge.      SECONDARY DISCHARGE DIAGNOSES  Diagnosis: COPD (chronic obstructive pulmonary disease)  Assessment and Plan of Treatment: You have history of COPD (chronic obstructive pulmonary disease). Please continue taking SYMBYCORT, ALBUTEROL AND PREDNISONE as prescribed. Taper PREDNISONE as follows:  4 tab(s)  once a day x 1 days  3 tab(s) once a day x 2 days  2 tab(s) once a day x 2 days  1 tab(s)  once a day x 1 days  Please follow up with PCP in a week from discharge.    Diagnosis: Lung granuloma  Assessment and Plan of Treatment: On CT chest you were found to have scattered tree-in-bud nodules consistent with small airways inflammation/infection; question KOBE infection. You were seen by pulmonologist, who recommends ISONIAZID and VITAMIN B6 for 9 months. Please report to your PCP if you experience fatigue, jaundice, abdominal distress or nausea which could be side effects of ISONIAZID. Please monitor your liver enzymes routinely.    Diagnosis: Uncontrolled hypertension  Assessment and Plan of Treatment: You have a history of Hypertension.   On this admission, your Blood Pressure was adequately controlled with LOSARTAN.   Your blood pressure target is 120-140/80-90, maintain healthy lifestyle, low salt diet, avoid fatty food, weight loss, exercise regularly as tolerated 30 mins X 3 times per week.  Notify your doctor if you have any of the following symptoms:   (Dizziness, Lightheadedness, Blurry vision, Headache, Chest pain, Shortness of breath.)  Please continue to take LOSARTAN as prescribed and follow-up with your PCP in 1 week from discharge.        PRINCIPAL DISCHARGE DIAGNOSIS  Diagnosis: Chest pain  Assessment and Plan of Treatment: You presented to the hospital complaining of chest pain.   EKG showed normal sinus rhythm. You were amitted to telemetry unit, no arrhythmia was seen on Tele monitor. Your serial cardiac enzymes were negative for Heart attack. Your Echocardiogram did not show any significant valvular pathology. Your stress test was negative for ischemia. You were seen by cardiologist, recommends to follow up as outpatient in a week from discharge.      SECONDARY DISCHARGE DIAGNOSES  Diagnosis: COPD (chronic obstructive pulmonary disease)  Assessment and Plan of Treatment: You have history of COPD (chronic obstructive pulmonary disease). Please continue taking SYMBYCORT, ALBUTEROL AND PREDNISONE as prescribed. Taper PREDNISONE as follows starting 2/5/2021:  4 tab(s)  once a day x 1 days  3 tab(s) once a day x 2 days  2 tab(s) once a day x 2 days  1 tab(s)  once a day x 1 days  Please follow up with PCP in a week from discharge.    Diagnosis: Lung granuloma  Assessment and Plan of Treatment: On CT chest you were found to have scattered tree-in-bud nodules consistent with small airways inflammation/infection; question KOBE infection. You were seen by pulmonologist, who recommends ISONIAZID and VITAMIN B6 for 9 months. Please report to your PCP if you experience fatigue, jaundice, abdominal distress or nausea which could be side effects of ISONIAZID. Please monitor your liver enzymes weekly and follow up with your PCP.    Diagnosis: Uncontrolled hypertension  Assessment and Plan of Treatment: You have a history of Hypertension.   On this admission, your Blood Pressure was adequately controlled with LOSARTAN.   Your blood pressure target is 120-140/80-90, maintain healthy lifestyle, low salt diet, avoid fatty food, weight loss, exercise regularly as tolerated 30 mins X 3 times per week.  Notify your doctor if you have any of the following symptoms:   (Dizziness, Lightheadedness, Blurry vision, Headache, Chest pain, Shortness of breath.)  Please continue to take LOSARTAN as prescribed and follow-up with your PCP in 1 week from discharge.

## 2021-02-03 NOTE — PATIENT PROFILE ADULT - VISION (WITH CORRECTIVE LENSES IF THE PATIENT USUALLY WEARS THEM):
blurry/Partially impaired: cannot see medication labels or newsprint, but can see obstacles in path, and the surrounding layout; can count fingers at arm's length

## 2021-02-03 NOTE — CONSULT NOTE ADULT - SUBJECTIVE AND OBJECTIVE BOX
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  Onset was earlier yesterday.  She felt drowsy and nauseated after eating.  She checked her BP and found the systolic was in 200mmHg range.  Later she repeated using her friend's bp machine and it was still elevated. She took her friend's amlodipine and losartan. This morning when she woke up, blood pressure was 190/100.  She also reports vague symptoms of dizziness and palpitations. She says she had left sided chest pain more than a week ago that went away on its own and was not related to exertion, attributes to gas.No history of COVID or travel. No h/o CVA or CAD. Denies , weakness, fever, chills, cough or any other complaints.     She has no prior history of hypertension.  She has a primary care doctor but does not go for routine maintenance and cant recall last time she was in office for something besides an acute issue.  No history of fainting, no history of exertional angina.  Does not have headaches, episodes of pallor or tremors.  States her heartrate was normal but she was very aware of it due to 'pounding feeling' in her chest.  No transmission of this sensation into her neck, or any accompanying desire to urinate. A 10 pt ROS is otherwise negative, and she feels well now with normal BP.  No exercise or weight loss supplements, no herbal supplements or OTC medications.    PAST MEDICAL & SURGICAL HISTORY:  Sixth nerve palsy    COPD (chronic obstructive pulmonary disease)    S/P thyroid surgery    S/P RANDOLPH-BSO      MEDICATIONS  (STANDING):  budesonide  80 MICROgram(s)/formoterol 4.5 MICROgram(s) Inhaler 2 Puff(s) Inhalation two times a day  enoxaparin Injectable 30 milliGRAM(s) SubCutaneous daily  losartan 25 milliGRAM(s) Oral daily  methylPREDNISolone sodium succinate Injectable 40 milliGRAM(s) IV Push every 8 hours  pantoprazole    Tablet 40 milliGRAM(s) Oral before breakfast      Allergies    Motrin (Swelling)    Intolerances    FAMILY HISTORY:  No pertinent family history in first degree relatives      Non-contributary for premature coronary disease or sudden cardiac death    SOCIAL HISTORY:    [x ] Non-smoker  [ ] Smoker  [ ] Alcohol    PHYSICAL EXAM:  T(C): 36.7 (02-03-21 @ 08:30), Max: 37.4 (02-02-21 @ 15:24)  HR: 87 (02-03-21 @ 08:30) (70 - 98)  BP: 148/79 (02-03-21 @ 08:30) (104/65 - 177/81)  RR: 18 (02-03-21 @ 08:30) (18 - 20)  SpO2: 97% (02-03-21 @ 08:30) (96% - 98%)  Wt(kg): --    General: Well nourished, no acute distress, alert and oriented x 3  Head: normocephalic, no trauma  Neck: no JVD, no bruit, supple, not enlarged  CV: S1S2, no S3, regular rate, rhythm is SINUS, no murmurs.    Lungs: clear BL, no rales or wheezes  Abdomen: bowel sounds +, soft, nontender, nondistended  Extremities: no clubbing, cyanosis or edema  Neuro: Moves all 4 extremities, sensation intact x 4 extremities  Skin: warm and moist, normal turgor  Psych: Mood and affect are appropriate for circumstances  MSK: normal range of motion and strength x4 extremities.    TELEMETRY: NSR 	    ECG: NSR, borderline LVH 	  	  LABS:	 	                          12.0   5.64  )-----------( 386      ( 03 Feb 2021 06:49 )             38.3     02-03    138  |  104  |  25<H>  ----------------------------<  123<H>  4.0   |  26  |  1.30    Ca    9.1      03 Feb 2021 06:49  Phos  2.6     02-03  Mg     2.5     02-03    TPro  7.9  /  Alb  3.4<L>  /  TBili  0.5  /  DBili  x   /  AST  18  /  ALT  23  /  AlkPhos  72  02-03    proBNP: Serum Pro-Brain Natriuretic Peptide: 69 pg/mL (02-02 @ 12:30)  TSH: Thyroid Stimulating Hormone, Serum: 1.98 uU/mL (02-03 @ 06:49)    ASSESSMENT/PLAN: 	66y Female with episodic severe HTN, chest pain and palpitations.  CKD 3 on labs.    Monitor on telemetry during admission, check echocardiogram re: LVH on EKG.  If no arrhythmia on telemetry, recommend outpatient event monitoring.  TSH is normal, but can consider sending remainder of thyroid labs.    Consider 24-hr metanephrines (potentially as outpatient) for potential cause of episodic severe HTN.  Will follow.      Alfonzo Muñiz M.D.  Cardiac Electrophysiology    office [phone]  pager [phone]

## 2021-02-03 NOTE — DISCHARGE NOTE PROVIDER - NSDCMRMEDTOKEN_GEN_ALL_CORE_FT
ipratropium-albuterol 0.5 mg-2.5 mg/3 mLinhalation solution: 3 milliliter(s) inhaled 4 times a day, As Needed  Symbicort 80 mcg-4.5 mcg/inh inhalation aerosol: 2 puff(s) inhaled 2 times a day   albuterol 90 mcg/inh inhalation aerosol: 2 puff(s) inhaled every 6 hours, As needed, Shortness of Breath and/or Wheezing  losartan 25 mg oral tablet: 1 tab(s) orally once a day  pantoprazole 40 mg oral delayed release tablet: 1 tab(s) orally once a day (before a meal)  predniSONE 10 mg oral tablet: 4 tab(s)  once a day x 1 days  3 tab(s) once a day x 2 days  2 tab(s) once a day x 2 days  1 tab(s)  once a day x 1 days  Symbicort 80 mcg-4.5 mcg/inh inhalation aerosol: 2 puff(s) inhaled 2 times a day   albuterol 90 mcg/inh inhalation aerosol: 2 puff(s) inhaled every 6 hours, As needed, Shortness of Breath and/or Wheezing  losartan 25 mg oral tablet: 1 tab(s) orally once a day  pantoprazole 40 mg oral delayed release tablet: 1 tab(s) orally once a day (before a meal)  predniSONE 10 mg oral tablet: 4 tab(s)  once a day x 1 days  3 tab(s) once a day x 2 days  2 tab(s) once a day x 2 days  1 tab(s)  once a day x 1 days  pyridoxine 50 mg oral tablet: 1 tab(s) orally once a day   Symbicort 80 mcg-4.5 mcg/inh inhalation aerosol: 2 puff(s) inhaled 2 times a day

## 2021-02-03 NOTE — CONSULT NOTE ADULT - SUBJECTIVE AND OBJECTIVE BOX
HISTORY OF PRESENT ILLNESS: HPI:  Patient, a 66 year old female with PMH of COPD and 6th nerve palsy presents to the ED with complaints of high blood pressure and palpitations this morning. Patient states she has been feeling drowsy after eating. Yesterday, she checked her bp which was in 200s and later she repeated using her friend's bp machine and it was still elevated. She took her friend's amlodipine and losartan. This morning when she woke up, blood pressure was 190/100.  She also reports vague symptoms of dizziness and palpitations. Patient states she has double vision and thought her symptoms were due to her 6th nerve palsy She says she had left sided chest pain more than a week ago that went away on its own and was not related to exertion, attributes to gas.No history of COVID or travel. No h/o CVA or CAD. Denies , weakness, fever, chills, cough or any other complaints.   Patient also reports episodes of chest pressure.  she also reports some exertional dyspnea and palpitations.  No LOC.  No history of CAD or cardiac w/u    In ED,  Vital Signs Last 24 Hrs  T(C): 36.4 (02 Feb 2021 19:20), Max: 37.4 (02 Feb 2021 15:24)  T(F): 97.6 (02 Feb 2021 19:20), Max: 99.3 (02 Feb 2021 15:24)  HR: 70 (02 Feb 2021 19:20) (70 - 98)  BP: 164/78 (02 Feb 2021 19:20) (164/78 - 177/81)  BP(mean): --  RR: 20 (02 Feb 2021 19:20) (20 - 20)  SpO2: 98% (02 Feb 2021 19:20) (96% - 98%)    GOC: Full code  (02 Feb 2021 18:10)      PAST MEDICAL & SURGICAL HISTORY:  Sixth nerve palsy    COPD (chronic obstructive pulmonary disease)    S/P thyroid surgery    S/P RANDOLPH-BSO            MEDICATIONS:  MEDICATIONS  (STANDING):  budesonide  80 MICROgram(s)/formoterol 4.5 MICROgram(s) Inhaler 2 Puff(s) Inhalation two times a day  enoxaparin Injectable 30 milliGRAM(s) SubCutaneous daily  losartan 25 milliGRAM(s) Oral daily  methylPREDNISolone sodium succinate Injectable 40 milliGRAM(s) IV Push every 8 hours  pantoprazole    Tablet 40 milliGRAM(s) Oral before breakfast      Allergies    Motrin (Swelling)    Intolerances        FAMILY HISTORY:  No pertinent family history in first degree relatives      Non-contributary for premature coronary disease or sudden cardiac death    SOCIAL HISTORY:    [ X Non-smoker  [ ] Smoker  [ ] Alcohol      REVIEW OF SYSTEMS:  [ Xchest pain  [ X]shortness of breath  [ X]palpitations  [  ]syncope  [ ]near syncope [ ]upper extremity weakness   [ ] lower extremity weakness  [  ]diplopia  [  ]altered mental status   [  ]fevers  [ ]chills [ ]nausea  [ ]vomitting  [  ]dysphagia    [ ]abdominal pain  [ ]melena  [ ]BRBPR    [  ]epistaxis  [  ]rash    [ ]lower extremity edema        [ X All others negative	  [ ] Unable to obtain      LABS:	 	    CARDIAC MARKERS:  CARDIAC MARKERS ( 02 Feb 2021 23:05 )  <0.015 ng/mL / x     / x     / x     / x      CARDIAC MARKERS ( 02 Feb 2021 12:30 )  <0.015 ng/mL / x     / x     / x     / x                                  12.0   5.64  )-----------( 386      ( 03 Feb 2021 06:49 )             38.3     Hb Trend: 12.0<--, 12.0<--    02-03    138  |  104  |  25<H>  ----------------------------<  123<H>  4.0   |  26  |  1.30    Ca    9.1      03 Feb 2021 06:49  Phos  2.6     02-03  Mg     2.5     02-03    TPro  7.9  /  Alb  3.4<L>  /  TBili  0.5  /  DBili  x   /  AST  18  /  ALT  23  /  AlkPhos  72  02-03    Creatinine Trend: 1.30<--, 1.03<--      proBNP: Serum Pro-Brain Natriuretic Peptide: 69 pg/mL (02-02 @ 12:30)      TSH: Thyroid Stimulating Hormone, Serum: 1.98 uU/mL (02-03 @ 06:49)          PHYSICAL EXAM:  T(C): 36.7 (02-03-21 @ 08:30), Max: 37.4 (02-02-21 @ 15:24)  HR: 87 (02-03-21 @ 08:30) (70 - 98)  BP: 148/79 (02-03-21 @ 08:30) (104/65 - 177/81)  RR: 18 (02-03-21 @ 08:30) (18 - 20)  SpO2: 97% (02-03-21 @ 08:30) (96% - 98%)  Wt(kg): --     I&O's Summary    HEENT:  (-)icterus (-)pallor  CV: N S1 S2 1/6 ROMÁN (+)2 Pulses B/l  Resp:  Clear to ausculatation B/L, normal effort  GI: (+) BS Soft, NT, ND  Lymph:  (-)Edema, (-)obvious lymphadenopathy  Skin: Warm to touch, Normal turgor  Psych: Appropriate mood and affect      TELEMETRY: 	  Sinus    ECG:  	sinus LVH 91 BPM    RADIOLOGY:         CXR:   < from: Xray Chest 1 View- PORTABLE-Urgent (02.02.21 @ 12:26) >  Calcified granulomas. No consolidation. Trace left pleural effusion    Heart size within normal limits.    < end of copied text >      ASSESSMENT/PLAN: 	66y Female  PMH of COPD and 6th nerve palsy presents to the ED with complaints of high blood pressure and palpitations, CP amd SOB r/o ofr mI.    - Echo  - If no perinent findings on echo plan for nuclear treadmill stress test  - TSH wnl  - Palpitations EP eval Dr. Muñiz called  - further rec pending above.    I once again thank you for allowing me to participate in the care of your patient.  If you have any questions or concerns please do not hesitate to contact me.      Raymundo Hutchins MD, [facility]  BEEPER [phone]

## 2021-02-03 NOTE — DISCHARGE NOTE PROVIDER - HOSPITAL COURSE
Patient, a 66 year old female with PMH of COPD and 6th nerve palsy presents to the ED with complaints of high blood pressure and palpitations this morning. Patient states she has been feeling drowsy after eating. Yesterday, she checked her bp which was in 200s and later she repeated using her friend's bp machine and it was still elevated. She took her friend's amlodipine and losartan.  Repeated blood pressure was 190/100, hence presented to hospital for evaluation and was admitted for blood pressure management.       CTA head and neck was negative. CXR showed calcified granulomas.  She was evaluated by Cardiology and EP, had an ECHO with result as below.            Incomplete:::::: Patient, a 66 year old female with PMH of COPD and 6th nerve palsy presents to the ED with complaints of high blood pressure and palpitations this morning. Patient states she has been feeling drowsy after eating. Yesterday, she checked her bp which was in 200s and later she repeated using her friend's bp machine and it was still elevated. She took her friend's amlodipine and losartan.  Repeated blood pressure was 190/100, hence presented to hospital for evaluation and was admitted for blood pressure management.       CTA head and neck was negative. CXR showed calcified granulomas.  She was evaluated by Cardiology and EP, had an ECHO:              Incomplete:::::: 66 year old female with PMH of COPD and 6th nerve palsy presented to the ED c/o high blood pressure, palpitations, left sided chest pain x1 week and dizziness. Patient admitted for uncontrolled hypertension and palpitations  EKG NSR, LVH, troponin negative, Echo showed traced MR, hyperdynamic LV fx >70%, GIDD, stress test no ischemia, CTA head and neck no stenosis. Patient was seen by cardiologist    CT chest Scattered tree-in-bud nodules consistent with small airways inflammation/infection; question KOBE infection. Patient was seen by pulmonologist recommends INH and vitamin B6 for 9 months.   Patients TSH 1.9, A1c 5.8 prediabetic, vitamin B12 and folate normal, LFTs normal, UA -ve, COVID19 -ve     Patient is stable for discharge and is advised to follow up with PCP as outpatient.  Please refer to patient's complete medical chart with documents for a full hospital course, for this is only a brief summary.

## 2021-02-03 NOTE — DISCHARGE NOTE PROVIDER - CARE PROVIDER_API CALL
Dada Johnson  Please call to schedule an appointment  Phone: (   )    -  Fax: (   )    -  Follow Up Time:    Dada Johnson  Please call to schedule an appointment  Phone: (   )    -  Fax: (   )    -  Follow Up Time:     Marcel Mcnally)  Medicine  Dept Director  [address] [place] [address]  Phone: [phone]  Fax: [phone]  Follow Up Time: 1 week

## 2021-02-04 VITALS — TEMPERATURE: 98 F | HEART RATE: 77 BPM | DIASTOLIC BLOOD PRESSURE: 49 MMHG | SYSTOLIC BLOOD PRESSURE: 105 MMHG

## 2021-02-04 LAB
ANION GAP SERPL CALC-SCNC: 7 MMOL/L — SIGNIFICANT CHANGE UP (ref 5–17)
BUN SERPL-MCNC: 33 MG/DL — HIGH (ref 7–18)
CALCIUM SERPL-MCNC: 9.1 MG/DL — SIGNIFICANT CHANGE UP (ref 8.4–10.5)
CHLORIDE SERPL-SCNC: 104 MMOL/L — SIGNIFICANT CHANGE UP (ref 96–108)
CO2 SERPL-SCNC: 26 MMOL/L — SIGNIFICANT CHANGE UP (ref 22–31)
CREAT SERPL-MCNC: 1.1 MG/DL — SIGNIFICANT CHANGE UP (ref 0.5–1.3)
GLUCOSE SERPL-MCNC: 143 MG/DL — HIGH (ref 70–99)
HCT VFR BLD CALC: 36.3 % — SIGNIFICANT CHANGE UP (ref 34.5–45)
HGB BLD-MCNC: 11.5 G/DL — SIGNIFICANT CHANGE UP (ref 11.5–15.5)
MAGNESIUM SERPL-MCNC: 2.3 MG/DL — SIGNIFICANT CHANGE UP (ref 1.6–2.6)
MCHC RBC-ENTMCNC: 27.8 PG — SIGNIFICANT CHANGE UP (ref 27–34)
MCHC RBC-ENTMCNC: 31.7 GM/DL — LOW (ref 32–36)
MCV RBC AUTO: 87.7 FL — SIGNIFICANT CHANGE UP (ref 80–100)
NRBC # BLD: 0 /100 WBCS — SIGNIFICANT CHANGE UP (ref 0–0)
PHOSPHATE SERPL-MCNC: 4.2 MG/DL — SIGNIFICANT CHANGE UP (ref 2.5–4.5)
PLATELET # BLD AUTO: 386 K/UL — SIGNIFICANT CHANGE UP (ref 150–400)
POTASSIUM SERPL-MCNC: 4.4 MMOL/L — SIGNIFICANT CHANGE UP (ref 3.5–5.3)
POTASSIUM SERPL-SCNC: 4.4 MMOL/L — SIGNIFICANT CHANGE UP (ref 3.5–5.3)
RBC # BLD: 4.14 M/UL — SIGNIFICANT CHANGE UP (ref 3.8–5.2)
RBC # FLD: 17.3 % — HIGH (ref 10.3–14.5)
SODIUM SERPL-SCNC: 137 MMOL/L — SIGNIFICANT CHANGE UP (ref 135–145)
WBC # BLD: 15.55 K/UL — HIGH (ref 3.8–10.5)
WBC # FLD AUTO: 15.55 K/UL — HIGH (ref 3.8–10.5)

## 2021-02-04 PROCEDURE — 93306 TTE W/DOPPLER COMPLETE: CPT

## 2021-02-04 PROCEDURE — 86803 HEPATITIS C AB TEST: CPT

## 2021-02-04 PROCEDURE — 86140 C-REACTIVE PROTEIN: CPT

## 2021-02-04 PROCEDURE — 93017 CV STRESS TEST TRACING ONLY: CPT

## 2021-02-04 PROCEDURE — 83690 ASSAY OF LIPASE: CPT

## 2021-02-04 PROCEDURE — 99285 EMERGENCY DEPT VISIT HI MDM: CPT | Mod: 25

## 2021-02-04 PROCEDURE — 85025 COMPLETE CBC W/AUTO DIFF WBC: CPT

## 2021-02-04 PROCEDURE — 84100 ASSAY OF PHOSPHORUS: CPT

## 2021-02-04 PROCEDURE — 70498 CT ANGIOGRAPHY NECK: CPT

## 2021-02-04 PROCEDURE — U0005: CPT

## 2021-02-04 PROCEDURE — 83880 ASSAY OF NATRIURETIC PEPTIDE: CPT

## 2021-02-04 PROCEDURE — G0378: CPT

## 2021-02-04 PROCEDURE — 82962 GLUCOSE BLOOD TEST: CPT

## 2021-02-04 PROCEDURE — 82746 ASSAY OF FOLIC ACID SERUM: CPT

## 2021-02-04 PROCEDURE — 83036 HEMOGLOBIN GLYCOSYLATED A1C: CPT

## 2021-02-04 PROCEDURE — 83735 ASSAY OF MAGNESIUM: CPT

## 2021-02-04 PROCEDURE — 71045 X-RAY EXAM CHEST 1 VIEW: CPT

## 2021-02-04 PROCEDURE — 80053 COMPREHEN METABOLIC PANEL: CPT

## 2021-02-04 PROCEDURE — 81001 URINALYSIS AUTO W/SCOPE: CPT

## 2021-02-04 PROCEDURE — 84443 ASSAY THYROID STIM HORMONE: CPT

## 2021-02-04 PROCEDURE — 82607 VITAMIN B-12: CPT

## 2021-02-04 PROCEDURE — 80061 LIPID PANEL: CPT

## 2021-02-04 PROCEDURE — 86769 SARS-COV-2 COVID-19 ANTIBODY: CPT

## 2021-02-04 PROCEDURE — 93005 ELECTROCARDIOGRAM TRACING: CPT

## 2021-02-04 PROCEDURE — 70496 CT ANGIOGRAPHY HEAD: CPT

## 2021-02-04 PROCEDURE — 85027 COMPLETE CBC AUTOMATED: CPT

## 2021-02-04 PROCEDURE — 71250 CT THORAX DX C-: CPT

## 2021-02-04 PROCEDURE — 87635 SARS-COV-2 COVID-19 AMP PRB: CPT

## 2021-02-04 PROCEDURE — 78452 HT MUSCLE IMAGE SPECT MULT: CPT

## 2021-02-04 PROCEDURE — 84484 ASSAY OF TROPONIN QUANT: CPT

## 2021-02-04 PROCEDURE — A9502: CPT

## 2021-02-04 PROCEDURE — 94640 AIRWAY INHALATION TREATMENT: CPT

## 2021-02-04 PROCEDURE — 80048 BASIC METABOLIC PNL TOTAL CA: CPT

## 2021-02-04 PROCEDURE — 36415 COLL VENOUS BLD VENIPUNCTURE: CPT

## 2021-02-04 RX ORDER — PANTOPRAZOLE SODIUM 20 MG/1
1 TABLET, DELAYED RELEASE ORAL
Qty: 7 | Refills: 0
Start: 2021-02-04 | End: 2021-02-10

## 2021-02-04 RX ORDER — PYRIDOXINE HCL (VITAMIN B6) 100 MG
1 TABLET ORAL
Qty: 30 | Refills: 0
Start: 2021-02-04 | End: 2021-03-05

## 2021-02-04 RX ORDER — BUDESONIDE AND FORMOTEROL FUMARATE DIHYDRATE 160; 4.5 UG/1; UG/1
2 AEROSOL RESPIRATORY (INHALATION)
Qty: 2 | Refills: 0
Start: 2021-02-04 | End: 2021-03-05

## 2021-02-04 RX ORDER — LOSARTAN POTASSIUM 100 MG/1
1 TABLET, FILM COATED ORAL
Qty: 30 | Refills: 0
Start: 2021-02-04 | End: 2021-03-05

## 2021-02-04 RX ORDER — ALBUTEROL 90 UG/1
2 AEROSOL, METERED ORAL
Qty: 2 | Refills: 0
Start: 2021-02-04 | End: 2021-03-05

## 2021-02-04 RX ADMIN — PANTOPRAZOLE SODIUM 40 MILLIGRAM(S): 20 TABLET, DELAYED RELEASE ORAL at 05:47

## 2021-02-04 RX ADMIN — BUDESONIDE AND FORMOTEROL FUMARATE DIHYDRATE 2 PUFF(S): 160; 4.5 AEROSOL RESPIRATORY (INHALATION) at 09:38

## 2021-02-04 RX ADMIN — ENOXAPARIN SODIUM 30 MILLIGRAM(S): 100 INJECTION SUBCUTANEOUS at 11:27

## 2021-02-04 RX ADMIN — Medication 40 MILLIGRAM(S): at 14:57

## 2021-02-04 RX ADMIN — Medication 40 MILLIGRAM(S): at 05:50

## 2021-02-04 RX ADMIN — LOSARTAN POTASSIUM 25 MILLIGRAM(S): 100 TABLET, FILM COATED ORAL at 05:47

## 2021-02-04 NOTE — PROGRESS NOTE ADULT - REASON FOR ADMISSION
palpitations and high blood pressure

## 2021-02-04 NOTE — DISCHARGE NOTE NURSING/CASE MANAGEMENT/SOCIAL WORK - PATIENT PORTAL LINK FT
You can access the FollowMyHealth Patient Portal offered by [facility] by registering at the following website: [email or web address]. By joining [email or web address] FollowMyHealth portal, you will also be able to view your health information using other applications (apps) compatible with our system.

## 2021-02-04 NOTE — PROGRESS NOTE ADULT - SUBJECTIVE AND OBJECTIVE BOX
Patient is a 66y old  Female who presents with a chief complaint of palpitations and hypertensive urgency/chest pain, BP stable, NST negative, ECHO normal LVEF, CT chest granuloma changes, start INH/B6 for 9 month, D/C home, F/U pulmonary/cardiology out patient      INTERVAL HPI/OVERNIGHT EVENTS:  T(C): 36.8 (21 @ 08:21), Max: 36.8 (21 @ 00:44)  HR: 70 (21 @ 08:21) (70 - 85)  BP: 113/69 (21 @ 08:21) (103/60 - 132/84)  RR: 16 (21 @ 08:21) (16 - 18)  SpO2: 96% (21 @ 08:21) (95% - 98%)  Wt(kg): --    LABS:                        11.5   15.55 )-----------( 386      ( 2021 08:35 )             36.3     02-04    137  |  104  |  33<H>  ----------------------------<  143<H>  4.4   |  26  |  1.10    Ca    9.1      2021 08:35  Phos  4.2     02-04  Mg     2.3     02-04    TPro  7.9  /  Alb  3.4<L>  /  TBili  0.5  /  DBili  x   /  AST  18  /  ALT  23  /  AlkPhos  72  02-03      Urinalysis Basic - ( 2021 14:30 )    Color: Yellow / Appearance: Clear / S.010 / pH: x  Gluc: x / Ketone: Trace  / Bili: Negative / Urobili: Negative   Blood: x / Protein: Negative / Nitrite: Negative   Leuk Esterase: Small / RBC: 0-2 /HPF / WBC 6-10 /HPF   Sq Epi: x / Non Sq Epi: Few /HPF / Bacteria: Few /HPF      CAPILLARY BLOOD GLUCOSE            RADIOLOGY & ADDITIONAL TESTS:    Consultant(s) Notes Reviewed:  [x ] YES  [ ] NO    PHYSICAL EXAM:  GENERAL: well built, well nourished  HEAD:  Atraumatic, Normocephalic  EYES: EOMI, PERRLA, conjunctiva and sclera clear  ENT: No tonsillar erythema, exudates, or enlargement; Moist mucous membranes, Good dentition, No lesions  NECK: Supple, No JVD, Normal thyroid, no enlarged nodes  NERVOUS SYSTEM:  Alert & Oriented X3, Good concentration; Motor Strength 5/5 B/L upper and lower extremities; DTRs 2+ intact and symmetric, sensory intact  CHEST/LUNG: B/L good air entry; No rales, rhonchi, or wheezing  HEART: S1S2 normal, no S3, Regular rate and rhythm; No murmurs, rubs, or gallops  ABDOMEN: Soft, Nontender, Nondistended; Bowel sounds present  EXTREMITIES:  2+ Peripheral Pulses, No clubbing, cyanosis, or edema  LYMPH: No lymphadenopathy noted  SKIN: No rashes or lesions    Care Discussed with Consultants/Other Providers [ x] YES  [ ] NO
Patient denies chest pain or shortness of breath.   Review of systems otherwise (-)  	  MEDICATIONS:  MEDICATIONS  (STANDING):  budesonide  80 MICROgram(s)/formoterol 4.5 MICROgram(s) Inhaler 2 Puff(s) Inhalation two times a day  enoxaparin Injectable 30 milliGRAM(s) SubCutaneous daily  losartan 25 milliGRAM(s) Oral daily  methylPREDNISolone sodium succinate Injectable 40 milliGRAM(s) IV Push every 8 hours  pantoprazole    Tablet 40 milliGRAM(s) Oral before breakfast      LABS:	 	    CARDIAC MARKERS:  CARDIAC MARKERS ( 02 Feb 2021 23:05 )  <0.015 ng/mL / x     / x     / x     / x      CARDIAC MARKERS ( 02 Feb 2021 12:30 )  <0.015 ng/mL / x     / x     / x     / x                                    11.5   15.55 )-----------( 386      ( 04 Feb 2021 08:35 )             36.3     Hemoglobin: 11.5 g/dL (02-04 @ 08:35)  Hemoglobin: 12.0 g/dL (02-03 @ 06:49)  Hemoglobin: 12.0 g/dL (02-02 @ 12:30)      02-04    137  |  104  |  33<H>  ----------------------------<  143<H>  4.4   |  26  |  1.10    Ca    9.1      04 Feb 2021 08:35  Phos  4.2     02-04  Mg     2.3     02-04    TPro  7.9  /  Alb  3.4<L>  /  TBili  0.5  /  DBili  x   /  AST  18  /  ALT  23  /  AlkPhos  72  02-03    Creatinine Trend: 1.10<--, 1.30<--, 1.03<--    PHYSICAL EXAM:  T(C): 36.9 (02-04-21 @ 11:34), Max: 36.9 (02-04-21 @ 11:34)  HR: 82 (02-04-21 @ 11:34) (70 - 85)  BP: 116/58 (02-04-21 @ 11:34) (103/60 - 132/84)  RR: 16 (02-04-21 @ 11:34) (16 - 18)  SpO2: 98% (02-04-21 @ 11:34) (95% - 98%)  Wt(kg): --  I&O's Summary      HEENT:  (-)icterus (-)pallor  CV: N S1 S2 1/6 ROMÁN (+)2 Pulses B/l  Resp:  Clear to ausculatation B/L, normal effort  GI: (+) BS Soft, NT, ND  Lymph:  (-)Edema, (-)obvious lymphadenopathy  Skin: Warm to touch, Normal turgor  Psych: Appropriate mood and affect      TELEMETRY: 	  sinus        ASSESSMENT/PLAN: 	66y  Female PMH of COPD and 6th nerve palsy presents to the ED with complaints of high blood pressure and palpitations, CP and SOB r/o ofr mI.    - echo with normal lV fx  - Stress with no ischemia  - Tele with no pertient findinhgs  - EP eval appreciated  - outpt event monitor [phone]    Raymundo Hutchins MD, [facility]  BEEPER [phone]    
Patient is a 66y old  Female who presents with a chief complaint of palpitations and high blood pressure (2021 09:46)/chest pain, F/U ECHO/NST today, chest pain subsided, BP better controlled      INTERVAL HPI/OVERNIGHT EVENTS:  T(C): 36.7 (21 @ 08:30), Max: 37.4 (21 @ 15:24)  HR: 87 (21 @ 08:30) (70 - 91)  BP: 148/79 (21 @ 08:30) (104/65 - 177/81)  RR: 18 (21 @ 08:30) (18 - 20)  SpO2: 97% (21 @ 08:30) (96% - 98%)  Wt(kg): --    LABS:                        12.0   5.64  )-----------( 386      ( 2021 06:49 )             38.3     02-    138  |  104  |  25<H>  ----------------------------<  123<H>  4.0   |  26  |  1.30    Ca    9.1      2021 06:49  Phos  2.6     02-  Mg     2.5     -    TPro  7.9  /  Alb  3.4<L>  /  TBili  0.5  /  DBili  x   /  AST  18  /  ALT  23  /  AlkPhos  72  02-03      Urinalysis Basic - ( 2021 14:30 )    Color: Yellow / Appearance: Clear / S.010 / pH: x  Gluc: x / Ketone: Trace  / Bili: Negative / Urobili: Negative   Blood: x / Protein: Negative / Nitrite: Negative   Leuk Esterase: Small / RBC: 0-2 /HPF / WBC 6-10 /HPF   Sq Epi: x / Non Sq Epi: Few /HPF / Bacteria: Few /HPF      CAPILLARY BLOOD GLUCOSE      POCT Blood Glucose.: 103 mg/dL (2021 11:40)        RADIOLOGY & ADDITIONAL TESTS:    Consultant(s) Notes Reviewed:  [x ] YES  [ ] NO    PHYSICAL EXAM:  GENERAL: well built, well nourished  HEAD:  Atraumatic, Normocephalic  EYES: EOMI, PERRLA, conjunctiva and sclera clear  ENT: No tonsillar erythema, exudates, or enlargement; Moist mucous membranes, Good dentition, No lesions  NECK: Supple, No JVD, Normal thyroid, no enlarged nodes  NERVOUS SYSTEM:  Alert & Oriented X3, Good concentration; Motor Strength 5/5 B/L upper and lower extremities; DTRs 2+ intact and symmetric, sensory intact  CHEST/LUNG: B/L good air entry; No rales, rhonchi, or wheezing  HEART: S1S2 normal, no S3, Regular rate and rhythm; No murmurs, rubs, or gallops  ABDOMEN: Soft, Nontender, Nondistended; Bowel sounds present  EXTREMITIES:  2+ Peripheral Pulses, No clubbing, cyanosis, or edema  LYMPH: No lymphadenopathy noted  SKIN: No rashes or lesions    Care Discussed with Consultants/Other Providers [ x] YES  [ ] NO
Time of Visit:  Patient seen and examined.     MEDICATIONS  (STANDING):  budesonide  80 MICROgram(s)/formoterol 4.5 MICROgram(s) Inhaler 2 Puff(s) Inhalation two times a day  enoxaparin Injectable 30 milliGRAM(s) SubCutaneous daily  losartan 25 milliGRAM(s) Oral daily  methylPREDNISolone sodium succinate Injectable 40 milliGRAM(s) IV Push every 8 hours  pantoprazole    Tablet 40 milliGRAM(s) Oral before breakfast      MEDICATIONS  (PRN):  ALBUTerol    90 MICROgram(s) HFA Inhaler 2 Puff(s) Inhalation every 6 hours PRN Shortness of Breath and/or Wheezing       Medications up to date at time of exam.      PHYSICAL EXAMINATION:  Patient has no new complaints.  GENERAL: The patient is a well-developed, well-nourished, in no apparent distress.     Vital Signs Last 24 Hrs  T(C): 36.7 (2021 21:35), Max: 36.7 (2021 23:56)  T(F): 98 (2021 21:35), Max: 98.1 (2021 15:30)  HR: 71 (2021 21:35) (70 - 91)  BP: 115/69 (2021 21:35) (104/65 - 148/79)  BP(mean): 86 (2021 19:00) (86 - 96)  RR: 18 (2021 21:35) (18 - 18)  SpO2: 96% (2021 21:35) (96% - 98%)   (if applicable)    Chest Tube (if applicable)    HEENT: Head is normocephalic and atraumatic. Extraocular muscles are intact. Mucous membranes are moist.     NECK: Supple, no palpable adenopathy.    LUNGS: Clear to auscultation, no wheezing, rales, or rhonchi.    HEART: Regular rate and rhythm without murmur.    ABDOMEN: Soft, nontender, and nondistended.  No hepatosplenomegaly is noted.    : No painful voiding, no pelvic pain    EXTREMITIES: Without any cyanosis, clubbing, rash, lesions or edema.    NEUROLOGIC: Awake, alert, oriented, grossly intact    SKIN: Warm, dry, good turgor.      LABS:                        12.0   5.64  )-----------( 386      ( 2021 06:49 )             38.3         138  |  104  |  25<H>  ----------------------------<  123<H>  4.0   |  26  |  1.30    Ca    9.1      2021 06:49  Phos  2.6       Mg     2.5         TPro  7.9  /  Alb  3.4<L>  /  TBili  0.5  /  DBili  x   /  AST  18  /  ALT  23  /  AlkPhos  72        Urinalysis Basic - ( 2021 14:30 )    Color: Yellow / Appearance: Clear / S.010 / pH: x  Gluc: x / Ketone: Trace  / Bili: Negative / Urobili: Negative   Blood: x / Protein: Negative / Nitrite: Negative   Leuk Esterase: Small / RBC: 0-2 /HPF / WBC 6-10 /HPF   Sq Epi: x / Non Sq Epi: Few /HPF / Bacteria: Few /HPF        CARDIAC MARKERS ( 2021 23:05 )  <0.015 ng/mL / x     / x     / x     / x      CARDIAC MARKERS ( 2021 12:30 )  <0.015 ng/mL / x     / x     / x     / x            Serum Pro-Brain Natriuretic Peptide: 69 pg/mL (21 @ 12:30)          MICROBIOLOGY: (if applicable)    RADIOLOGY & ADDITIONAL STUDIES:  EKG:   CT chest:< from: CT Chest No Cont (21 @ 18:03) >    EXAM:  CT CHEST                            PROCEDURE DATE:  2021          INTERPRETATION:  CLINICAL INFORMATION: h/o granulomas ADMDIAG1: R07.9 CHEST PAIN, UNSPECIFIED/ pt p/w dizziness, h/o granulomas    COMPARISON: CT abdomen 18.    PROCEDURE:  CT of the Chest was performed without intravenous contrast.  Sagittal and coronal reformats were performed.      FINDINGS:    LUNGS AND LARGE AIRWAYS: Patent central airways. There are scattered tree-in-bud nodules. There is cystic bronchiectasis, predominantly in the left lower lobe.  PLEURA: No pleural effusion.  VESSELS: Within normal limits.  HEART: Heart size is normal.  No pericardial effusion.  MEDIASTINUM AND SERGO: No lymphadenopathy.  CHEST WALL AND LOWER NECK: Within normal limits.  UPPER ABDOMEN: Subcentimeter liver lesions too small to characterize.  BONES: Within normal limits.    IMPRESSION: Scattered tree-in-bud nodules consistent with small airways inflammation/infection; question KOBE infection.    Cystic bronchiectasis left lower lobe.                MIR BYRNE MD; Attending Radiologist  This document has been electronically signed. Feb  3 2021  6:30PM    < end of copied text >    ECHO:< from: Transthoracic Echocardiogram (21 @ 07:04) >    Patient name: AMY LIGHT  YOB: 1955   Age: 66 (F)   MR#: [id number]  Study Date: 2/3/2021  [facility]a P[facility]  Study quality: Technically good  Referring Physician:  JIAN SCHROEDER MD  Blood Pressure:148/79 mmHg  Height: 152 cm  Weight: 49 kg  BSA: 1.4 m2  ------------------------------------------------------------------------    PROCEDURE: Transthoracic echocardiogram with 2-D, M-Mode  and complete spectral and color flow Doppler.  INDICATION: Chest pain, unspecified (R07.9)  HISTORY:  ------------------------------------------------------------------------  DIMENSIONS:  Dimensions:     Normal Values:  LA:     2.3 cm    2.0 - 4.0 cm  Ao:     2.7 cm    2.0 - 3.8 cm  SEPTUM: 1.0 cm    0.6 - 1.2 cm  PWT:    1.0 cm    0.6 - 1.1 cm  LVIDd:  3.6 cm    3.0 - 5.6 cm  LVIDs:  1.9 cm    1.8 - 4.0 cm      Derived Variables:  LVMI: 75 g/m2  RWT: 0.55  Ejection Fraction Visual Estimate: >70 %    ------------------------------------------------------------------------  OBSERVATIONS:  Mitral Valve: Normal mitral valve. Trace mitral  regurgitation.  Aortic Root: Normal aortic root.  Aortic Valve: Normal trileaflet aortic valve.  Left Atrium: LA volume index = 10 cc/m2.  Left Ventricle: Hyperdynamicleft ventricular systolic  function (EF >70%). Mild diastolic dysfunction (stage I).  Increased relative wall thickness with normal left  ventricular (LV) mass index, consistent with concentric LV  remodeling.  Right Heart: Normal right atrium. Normal right ventricular  size and function. There is trace tricuspid regurgitation.  There is trace pulmonic regurgitation.  Pericardium/PleuraNormal pericardium with no pericardial  effusion.  Hemodynamic: Incomplete tricuspid regurgitation jet  precludes accurate assessment of pulmonary artery systolic  pressure.  ------------------------------------------------------------------------  CONCLUSIONS:  1. Trace mitral regurgitation.  2. Increased relative wall thickness with normal left  ventricular (LV) mass index, consistent with concentric LV  remodeling.  3. Hyperdynamic left ventricular systolic function (EF  >70%). Mild diastolic dysfunction (stage I).  4. Normal right ventricular size and function.    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 Pat noted to have wheezes b/l. HLD    Sugg;  -start antihypertensive meds  -statin   -solumedrol   -albuterol inhaler  -CT chest  - mg /day x 9 months   -Vit B6 50 mg /day  -monitor LFT weekly  -CAD work up  -out pat pulmonary f/u  -dvt/gi prophy      
Time of Visit:  Patient seen and examined. pat is laying in bed comfortable     MEDICATIONS  (STANDING):  budesonide  80 MICROgram(s)/formoterol 4.5 MICROgram(s) Inhaler 2 Puff(s) Inhalation two times a day  enoxaparin Injectable 30 milliGRAM(s) SubCutaneous daily  losartan 25 milliGRAM(s) Oral daily  methylPREDNISolone sodium succinate Injectable 40 milliGRAM(s) IV Push every 8 hours  pantoprazole    Tablet 40 milliGRAM(s) Oral before breakfast      MEDICATIONS  (PRN):  ALBUTerol    90 MICROgram(s) HFA Inhaler 2 Puff(s) Inhalation every 6 hours PRN Shortness of Breath and/or Wheezing       Medications up to date at time of exam.      PHYSICAL EXAMINATION:  Patient has no new complaints.  GENERAL: The patient is a well-developed, well-nourished, in no apparent distress.     Vital Signs Last 24 Hrs  T(C): 36.5 (04 Feb 2021 15:19), Max: 36.9 (04 Feb 2021 11:34)  T(F): 97.7 (04 Feb 2021 15:19), Max: 98.5 (04 Feb 2021 11:34)  HR: 77 (04 Feb 2021 15:19) (70 - 82)  BP: 105/49 (04 Feb 2021 15:19) (103/60 - 116/58)  BP(mean): 86 (03 Feb 2021 19:00) (86 - 86)  RR: 16 (04 Feb 2021 11:34) (16 - 18)  SpO2: 98% (04 Feb 2021 11:34) (95% - 98%)   (if applicable)    Chest Tube (if applicable)    HEENT: Head is normocephalic and atraumatic. Extraocular muscles are intact. Mucous membranes are moist.     NECK: Supple, no palpable adenopathy.    LUNGS: Clear to auscultation, no wheezing, rales, or rhonchi.    HEART: Regular rate and rhythm without murmur.    ABDOMEN: Soft, nontender, and nondistended.  No hepatosplenomegaly is noted.    : No painful voiding, no pelvic pain    EXTREMITIES: Without any cyanosis, clubbing, rash, lesions or edema.    NEUROLOGIC: Awake, alert, oriented, grossly intact    SKIN: Warm, dry, good turgor.      LABS:                        11.5   15.55 )-----------( 386      ( 04 Feb 2021 08:35 )             36.3     02-04    137  |  104  |  33<H>  ----------------------------<  143<H>  4.4   |  26  |  1.10    Ca    9.1      04 Feb 2021 08:35  Phos  4.2     02-04  Mg     2.3     02-04    TPro  7.9  /  Alb  3.4<L>  /  TBili  0.5  /  DBili  x   /  AST  18  /  ALT  23  /  AlkPhos  72  02-03          CARDIAC MARKERS ( 02 Feb 2021 23:05 )  <0.015 ng/mL / x     / x     / x     / x            Serum Pro-Brain Natriuretic Peptide: 69 pg/mL (02-02-21 @ 12:30)          MICROBIOLOGY: (if applicable)    RADIOLOGY & ADDITIONAL STUDIES:  EKG:   CT chest ;< from: CT Chest No Cont (02.03.21 @ 18:03) >    EXAM:  CT CHEST                            PROCEDURE DATE:  02/03/2021          INTERPRETATION:  CLINICAL INFORMATION: h/o granulomas ADMDIAG1: R07.9 CHEST PAIN, UNSPECIFIED/ pt p/w dizziness, h/o granulomas    COMPARISON: CT abdomen 9.9.18.    PROCEDURE:  CT of the Chest was performed without intravenous contrast.  Sagittal and coronal reformats were performed.      FINDINGS:    LUNGS AND LARGE AIRWAYS: Patent central airways. There are scattered tree-in-bud nodules. There is cystic bronchiectasis, predominantly in the left lower lobe.  PLEURA: No pleural effusion.  VESSELS: Within normal limits.  HEART: Heart size is normal.  No pericardial effusion.  MEDIASTINUM AND SERGO: No lymphadenopathy.  CHEST WALL AND LOWER NECK: Within normal limits.  UPPER ABDOMEN: Subcentimeter liver lesions too small to characterize.  BONES: Within normal limits.    IMPRESSION: Scattered tree-in-bud nodules consistent with small airways inflammation/infection; question KOBE infection.    Cystic bronchiectasis left lower lobe.                MIR BYRNE MD; Attending Radiologist  This document has been electronically signed. 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 Pat noted to have wheezes b/l. HLD    Sugg;  -continue losartan , BP better control   -continue statin   -WBC elevated due to steroids   -albuterol inhaler  -CT chest noted   -pat has off with my office   -CAD work up  -out pat pulmonary f/u  -dvt/gi prophy        
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  Onset was earlier yesterday.  She felt drowsy and nauseated after eating.  She checked her BP and found the systolic was in 200mmHg range.  Later she repeated using her friend's bp machine and it was still elevated. She took her friend's amlodipine and losartan. This morning when she woke up, blood pressure was 190/100.  She also reports vague symptoms of dizziness and palpitations. She says she had left sided chest pain more than a week ago that went away on its own and was not related to exertion, attributes to gas.No history of COVID or travel. No h/o CVA or CAD. Denies , weakness, fever, chills, cough or any other complaints.     She has no prior history of hypertension.  She has a primary care doctor but does not go for routine maintenance and cant recall last time she was in office for something besides an acute issue.  No history of fainting, no history of exertional angina.  Does not have headaches, episodes of pallor or tremors.  States her heartrate was normal but she was very aware of it due to 'pounding feeling' in her chest.  No transmission of this sensation into her neck, or any accompanying desire to urinate. A 10 pt ROS is otherwise negative, and she feels well now with normal BP.  No exercise or weight loss supplements, no herbal supplements or OTC medications.    2/4- uneventful overnight.  no palpitations , orthopnea, angina or shortness of breath.  exercise testing yesterday limited by SOB / poor exercise tolerance.      ALBUTerol    90 MICROgram(s) HFA Inhaler 2 Puff(s) Inhalation every 6 hours PRN  budesonide  80 MICROgram(s)/formoterol 4.5 MICROgram(s) Inhaler 2 Puff(s) Inhalation two times a day  enoxaparin Injectable 30 milliGRAM(s) SubCutaneous daily  losartan 25 milliGRAM(s) Oral daily  methylPREDNISolone sodium succinate Injectable 40 milliGRAM(s) IV Push every 8 hours  pantoprazole    Tablet 40 milliGRAM(s) Oral before breakfast                            11.5   15.55 )-----------( 386      ( 04 Feb 2021 08:35 )             36.3       02-04    137  |  104  |  33<H>  ----------------------------<  143<H>  4.4   |  26  |  1.10    Ca    9.1      04 Feb 2021 08:35  Phos  4.2     02-04  Mg     2.3     02-04    TPro  7.9  /  Alb  3.4<L>  /  TBili  0.5  /  DBili  x   /  AST  18  /  ALT  23  /  AlkPhos  72  02-03      CARDIAC MARKERS ( 02 Feb 2021 23:05 )  <0.015 ng/mL / x     / x     / x     / x      CARDIAC MARKERS ( 02 Feb 2021 12:30 )  <0.015 ng/mL / x     / x     / x     / x          T(C): 36.9 (02-04-21 @ 11:34), Max: 36.9 (02-04-21 @ 11:34)  HR: 82 (02-04-21 @ 11:34) (70 - 85)  BP: 116/58 (02-04-21 @ 11:34) (103/60 - 132/84)  RR: 16 (02-04-21 @ 11:34) (16 - 18)  SpO2: 98% (02-04-21 @ 11:34) (95% - 98%)  Wt(kg): --    I&O's Summary    General: Well nourished, no acute distress, alert and oriented x 3  Head: normocephalic, no trauma  Neck: no JVD, no bruit, supple, not enlarged  CV: S1S2, no S3, regular rate, rhythm is SINUS, no murmurs.    Lungs: clear BL, no rales or wheezes  Abdomen: bowel sounds +, soft, nontender, nondistended  Extremities: no clubbing, cyanosis or edema  Neuro: Moves all 4 extremities, sensation intact x 4 extremities  Skin: warm and moist, normal turgor  Psych: Mood and affect are appropriate for circumstances  MSK: normal range of motion and strength x4 extremities.    TELEMETRY: NSR 	    ECG: NSR, borderline LVH 	  Echo: EF 70%, normal LV wall thickness.  Exercise stress MPI: normal perfusion, limited exercise tolerance.    ASSESSMENT/PLAN: 	66y Female with episodic severe HTN, chest pain and palpitations.  CKD 3 on labs.    Rx for HTN per general cardiology.  Outpatient event monitoring re: palpitations.  Consider 24-hr metanephrines (potentially as outpatient) for potential cause of episodic severe HTN.  No objection to discharge planning.     Alfonzo Muñiz M.D.  Cardiac Electrophysiology    office [phone]  pager [phone]

## 2021-02-04 NOTE — PROGRESS NOTE ADULT - ASSESSMENT
66 yr old female from home, H/O COPD/6th never palsy, admit hospital for dizziness/hypertensive urgency/associated with palpitation/chest pain, start losartan 25 mg po qd, BP better controlled, troponin time two negative,  ECHO showed normal LVEF, NST negative for ischemia, CT chest showed granuloma changes, start INH/B6 for 9 month,  BP controlled, D/C home today, F/U cardiology/pulmonary out patient, continue losartan/statin/tapering steroid/INH/B6.

## 2021-10-10 ENCOUNTER — EMERGENCY (EMERGENCY)
Facility: HOSPITAL | Age: 66
LOS: 1 days | Discharge: HOME | End: 2021-10-10
Attending: STUDENT IN AN ORGANIZED HEALTH CARE EDUCATION/TRAINING PROGRAM
Payer: COMMERCIAL

## 2021-10-10 VITALS
TEMPERATURE: 98 F | RESPIRATION RATE: 16 BRPM | DIASTOLIC BLOOD PRESSURE: 84 MMHG | OXYGEN SATURATION: 99 % | SYSTOLIC BLOOD PRESSURE: 129 MMHG | HEART RATE: 67 BPM

## 2021-10-10 VITALS
OXYGEN SATURATION: 100 % | RESPIRATION RATE: 17 BRPM | SYSTOLIC BLOOD PRESSURE: 210 MMHG | DIASTOLIC BLOOD PRESSURE: 104 MMHG | TEMPERATURE: 98 F | HEART RATE: 102 BPM | WEIGHT: 104.94 LBS

## 2021-10-10 DIAGNOSIS — Z98.890 OTHER SPECIFIED POSTPROCEDURAL STATES: Chronic | ICD-10-CM

## 2021-10-10 DIAGNOSIS — Z90.710 ACQUIRED ABSENCE OF BOTH CERVIX AND UTERUS: Chronic | ICD-10-CM

## 2021-10-10 LAB
ALBUMIN SERPL ELPH-MCNC: 3.2 G/DL — LOW (ref 3.5–5)
ALP SERPL-CCNC: 76 U/L — SIGNIFICANT CHANGE UP (ref 40–120)
ALT FLD-CCNC: 24 U/L DA — SIGNIFICANT CHANGE UP (ref 10–60)
ANION GAP SERPL CALC-SCNC: 8 MMOL/L — SIGNIFICANT CHANGE UP (ref 5–17)
AST SERPL-CCNC: 25 U/L — SIGNIFICANT CHANGE UP (ref 10–40)
BASOPHILS # BLD AUTO: 0.03 K/UL — SIGNIFICANT CHANGE UP (ref 0–0.2)
BASOPHILS NFR BLD AUTO: 0.3 % — SIGNIFICANT CHANGE UP (ref 0–2)
BILIRUB SERPL-MCNC: 0.2 MG/DL — SIGNIFICANT CHANGE UP (ref 0.2–1.2)
BUN SERPL-MCNC: 13 MG/DL — SIGNIFICANT CHANGE UP (ref 7–18)
CALCIUM SERPL-MCNC: 8.8 MG/DL — SIGNIFICANT CHANGE UP (ref 8.4–10.5)
CHLORIDE SERPL-SCNC: 106 MMOL/L — SIGNIFICANT CHANGE UP (ref 96–108)
CO2 SERPL-SCNC: 26 MMOL/L — SIGNIFICANT CHANGE UP (ref 22–31)
CREAT SERPL-MCNC: 1.33 MG/DL — HIGH (ref 0.5–1.3)
EOSINOPHIL # BLD AUTO: 0.15 K/UL — SIGNIFICANT CHANGE UP (ref 0–0.5)
EOSINOPHIL NFR BLD AUTO: 1.6 % — SIGNIFICANT CHANGE UP (ref 0–6)
GLUCOSE SERPL-MCNC: 133 MG/DL — HIGH (ref 70–99)
HCT VFR BLD CALC: 33.9 % — LOW (ref 34.5–45)
HGB BLD-MCNC: 11 G/DL — LOW (ref 11.5–15.5)
IMM GRANULOCYTES NFR BLD AUTO: 0.3 % — SIGNIFICANT CHANGE UP (ref 0–1.5)
LIDOCAIN IGE QN: 531 U/L — HIGH (ref 73–393)
LYMPHOCYTES # BLD AUTO: 2.83 K/UL — SIGNIFICANT CHANGE UP (ref 1–3.3)
LYMPHOCYTES # BLD AUTO: 30.3 % — SIGNIFICANT CHANGE UP (ref 13–44)
MAGNESIUM SERPL-MCNC: 2 MG/DL — SIGNIFICANT CHANGE UP (ref 1.6–2.6)
MCHC RBC-ENTMCNC: 28.5 PG — SIGNIFICANT CHANGE UP (ref 27–34)
MCHC RBC-ENTMCNC: 32.4 GM/DL — SIGNIFICANT CHANGE UP (ref 32–36)
MCV RBC AUTO: 87.8 FL — SIGNIFICANT CHANGE UP (ref 80–100)
MONOCYTES # BLD AUTO: 0.53 K/UL — SIGNIFICANT CHANGE UP (ref 0–0.9)
MONOCYTES NFR BLD AUTO: 5.7 % — SIGNIFICANT CHANGE UP (ref 2–14)
NEUTROPHILS # BLD AUTO: 5.76 K/UL — SIGNIFICANT CHANGE UP (ref 1.8–7.4)
NEUTROPHILS NFR BLD AUTO: 61.8 % — SIGNIFICANT CHANGE UP (ref 43–77)
NRBC # BLD: 0 /100 WBCS — SIGNIFICANT CHANGE UP (ref 0–0)
NT-PROBNP SERPL-SCNC: 178 PG/ML — HIGH (ref 0–125)
PLATELET # BLD AUTO: 352 K/UL — SIGNIFICANT CHANGE UP (ref 150–400)
POTASSIUM SERPL-MCNC: 3.6 MMOL/L — SIGNIFICANT CHANGE UP (ref 3.5–5.3)
POTASSIUM SERPL-SCNC: 3.6 MMOL/L — SIGNIFICANT CHANGE UP (ref 3.5–5.3)
PROT SERPL-MCNC: 7.8 G/DL — SIGNIFICANT CHANGE UP (ref 6–8.3)
RBC # BLD: 3.86 M/UL — SIGNIFICANT CHANGE UP (ref 3.8–5.2)
RBC # FLD: 14 % — SIGNIFICANT CHANGE UP (ref 10.3–14.5)
SODIUM SERPL-SCNC: 140 MMOL/L — SIGNIFICANT CHANGE UP (ref 135–145)
TROPONIN I SERPL-MCNC: <0.015 NG/ML — SIGNIFICANT CHANGE UP (ref 0–0.04)
WBC # BLD: 9.33 K/UL — SIGNIFICANT CHANGE UP (ref 3.8–10.5)
WBC # FLD AUTO: 9.33 K/UL — SIGNIFICANT CHANGE UP (ref 3.8–10.5)

## 2021-10-10 PROCEDURE — 80053 COMPREHEN METABOLIC PANEL: CPT

## 2021-10-10 PROCEDURE — 83880 ASSAY OF NATRIURETIC PEPTIDE: CPT

## 2021-10-10 PROCEDURE — 71045 X-RAY EXAM CHEST 1 VIEW: CPT

## 2021-10-10 PROCEDURE — 99284 EMERGENCY DEPT VISIT MOD MDM: CPT | Mod: 25

## 2021-10-10 PROCEDURE — 83690 ASSAY OF LIPASE: CPT

## 2021-10-10 PROCEDURE — 70450 CT HEAD/BRAIN W/O DYE: CPT | Mod: 26,MA

## 2021-10-10 PROCEDURE — 36415 COLL VENOUS BLD VENIPUNCTURE: CPT

## 2021-10-10 PROCEDURE — 93010 ELECTROCARDIOGRAM REPORT: CPT

## 2021-10-10 PROCEDURE — 71045 X-RAY EXAM CHEST 1 VIEW: CPT | Mod: 26

## 2021-10-10 PROCEDURE — 85025 COMPLETE CBC W/AUTO DIFF WBC: CPT

## 2021-10-10 PROCEDURE — 93005 ELECTROCARDIOGRAM TRACING: CPT

## 2021-10-10 PROCEDURE — 83735 ASSAY OF MAGNESIUM: CPT

## 2021-10-10 PROCEDURE — 84484 ASSAY OF TROPONIN QUANT: CPT

## 2021-10-10 PROCEDURE — 70450 CT HEAD/BRAIN W/O DYE: CPT | Mod: MA

## 2021-10-10 PROCEDURE — 99285 EMERGENCY DEPT VISIT HI MDM: CPT

## 2021-10-10 RX ORDER — MECLIZINE HCL 12.5 MG
1 TABLET ORAL
Qty: 12 | Refills: 0
Start: 2021-10-10 | End: 2021-10-12

## 2021-10-10 RX ORDER — LABETALOL HCL 100 MG
10 TABLET ORAL ONCE
Refills: 0 | Status: DISCONTINUED | OUTPATIENT
Start: 2021-10-10 | End: 2021-10-10

## 2021-10-10 RX ORDER — ACETAMINOPHEN 500 MG
975 TABLET ORAL ONCE
Refills: 0 | Status: COMPLETED | OUTPATIENT
Start: 2021-10-10 | End: 2021-10-10

## 2021-10-10 RX ORDER — MECLIZINE HCL 12.5 MG
25 TABLET ORAL ONCE
Refills: 0 | Status: COMPLETED | OUTPATIENT
Start: 2021-10-10 | End: 2021-10-10

## 2021-10-10 RX ORDER — SODIUM CHLORIDE 9 MG/ML
1000 INJECTION INTRAMUSCULAR; INTRAVENOUS; SUBCUTANEOUS ONCE
Refills: 0 | Status: COMPLETED | OUTPATIENT
Start: 2021-10-10 | End: 2021-10-10

## 2021-10-10 RX ADMIN — Medication 975 MILLIGRAM(S): at 18:57

## 2021-10-10 RX ADMIN — Medication 25 MILLIGRAM(S): at 22:06

## 2021-10-10 RX ADMIN — Medication 975 MILLIGRAM(S): at 19:27

## 2021-10-10 RX ADMIN — SODIUM CHLORIDE 1000 MILLILITER(S): 9 INJECTION INTRAMUSCULAR; INTRAVENOUS; SUBCUTANEOUS at 18:56

## 2021-10-10 NOTE — ED PROVIDER NOTE - PROGRESS NOTE DETAILS
patient  updated on all results. will discharge. Jesse Jung patient reports symptoms improved but is concerned that she felt lightheaded and is requesting a CT scan. neuro exam remains unchanged. Jesse Jung CT negative. patient ready for discharge. Jesse Jung

## 2021-10-10 NOTE — ED PROVIDER NOTE - NSFOLLOWUPINSTRUCTIONS_ED_ALL_ED_FT
You were seen in the emergency department for high blood pressure.     Please follow-up with your primary care doctor in the next 24-48 hours.    Please take your blood pressure medication as prescribed.    If you have any worsening symptoms, severe headache, chest pain or shortness of breath, please return to the emergency department.

## 2021-10-10 NOTE — ED PROVIDER NOTE - PATIENT PORTAL LINK FT
You can access the FollowMyHealth Patient Portal offered by [facility] by registering at the following website: [email or web address]. By joining [employer]’s FollowMyHealth portal, you will also be able to view your health information using other applications (apps) compatible with our system. You can access the FollowMyHealth Patient Portal offered by [facility] by registering at the following website: [email or web address]. By joining [employer]s FollowMyHealth portal, you will also be able to view your health information using other applications (apps) compatible with our system.

## 2021-10-10 NOTE — ED PROVIDER NOTE - PHYSICAL EXAMINATION
General: well appearing female, no acute distress   HEENT: normocephalic, atraumatic   Respiratory: normal work of breathing, lungs clear to auscultation bilaterally   Cardiac: regular rate and rhythm   Abdomen: soft, non-tender, no guarding or rebound   MSK: no swelling or tenderness of lower extremities, moving all extremities spontaneously   Skin: warm, dry   Neuro: A&Ox3, cranial nerves II-xII intact, 5/5 strength in all extremities   Psych: appropriate affect

## 2021-10-10 NOTE — ED PROVIDER NOTE - CLINICAL SUMMARY MEDICAL DECISION MAKING FREE TEXT BOX
66F presenting with htn and lightheadedness. nonfocal neuroexam. non-compliant with BP meds. BP improved ot 177/98 during initial exam so maribell withhold labetalol at this time.

## 2021-10-10 NOTE — ED PROVIDER NOTE - OBJECTIVE STATEMENT
66F, pmh of htn, COPD, presenting with high blood pressure and lightheadedness. patient reports symptoms present for two days. took blood pressure and it was in the 200s. patient took two doses of her losartan today but does not take her blood pressure medications every day as prescribed. no headache, dizziness, nausea, vomiting, chest pain or shortness of breath.

## 2021-10-11 ENCOUNTER — INPATIENT (INPATIENT)
Facility: HOSPITAL | Age: 66
LOS: 2 days | Discharge: HOME | DRG: 305 | End: 2021-10-14
Attending: STUDENT IN AN ORGANIZED HEALTH CARE EDUCATION/TRAINING PROGRAM | Admitting: STUDENT IN AN ORGANIZED HEALTH CARE EDUCATION/TRAINING PROGRAM
Payer: COMMERCIAL

## 2021-10-11 VITALS
RESPIRATION RATE: 18 BRPM | DIASTOLIC BLOOD PRESSURE: 93 MMHG | TEMPERATURE: 98 F | WEIGHT: 104.28 LBS | HEART RATE: 82 BPM | SYSTOLIC BLOOD PRESSURE: 207 MMHG | OXYGEN SATURATION: 97 %

## 2021-10-11 DIAGNOSIS — Z90.710 ACQUIRED ABSENCE OF BOTH CERVIX AND UTERUS: Chronic | ICD-10-CM

## 2021-10-11 DIAGNOSIS — Z98.890 OTHER SPECIFIED POSTPROCEDURAL STATES: Chronic | ICD-10-CM

## 2021-10-11 PROBLEM — H49.20 SIXTH [ABDUCENT] NERVE PALSY, UNSPECIFIED EYE: Chronic | Status: ACTIVE | Noted: 2021-02-02

## 2021-10-11 LAB
ALBUMIN SERPL ELPH-MCNC: 3.2 G/DL — LOW (ref 3.5–5)
ALP SERPL-CCNC: 74 U/L — SIGNIFICANT CHANGE UP (ref 40–120)
ALT FLD-CCNC: 23 U/L DA — SIGNIFICANT CHANGE UP (ref 10–60)
ANION GAP SERPL CALC-SCNC: 8 MMOL/L — SIGNIFICANT CHANGE UP (ref 5–17)
AST SERPL-CCNC: 20 U/L — SIGNIFICANT CHANGE UP (ref 10–40)
BASOPHILS # BLD AUTO: 0.05 K/UL — SIGNIFICANT CHANGE UP (ref 0–0.2)
BASOPHILS NFR BLD AUTO: 0.6 % — SIGNIFICANT CHANGE UP (ref 0–2)
BILIRUB SERPL-MCNC: 0.3 MG/DL — SIGNIFICANT CHANGE UP (ref 0.2–1.2)
BUN SERPL-MCNC: 9 MG/DL — SIGNIFICANT CHANGE UP (ref 7–18)
CALCIUM SERPL-MCNC: 8.9 MG/DL — SIGNIFICANT CHANGE UP (ref 8.4–10.5)
CHLORIDE SERPL-SCNC: 107 MMOL/L — SIGNIFICANT CHANGE UP (ref 96–108)
CO2 SERPL-SCNC: 28 MMOL/L — SIGNIFICANT CHANGE UP (ref 22–31)
CREAT SERPL-MCNC: 1.1 MG/DL — SIGNIFICANT CHANGE UP (ref 0.5–1.3)
EOSINOPHIL # BLD AUTO: 0.12 K/UL — SIGNIFICANT CHANGE UP (ref 0–0.5)
EOSINOPHIL NFR BLD AUTO: 1.5 % — SIGNIFICANT CHANGE UP (ref 0–6)
GLUCOSE SERPL-MCNC: 78 MG/DL — SIGNIFICANT CHANGE UP (ref 70–99)
HCT VFR BLD CALC: 35.6 % — SIGNIFICANT CHANGE UP (ref 34.5–45)
HGB BLD-MCNC: 11.5 G/DL — SIGNIFICANT CHANGE UP (ref 11.5–15.5)
IMM GRANULOCYTES NFR BLD AUTO: 0.9 % — SIGNIFICANT CHANGE UP (ref 0–1.5)
LYMPHOCYTES # BLD AUTO: 2.43 K/UL — SIGNIFICANT CHANGE UP (ref 1–3.3)
LYMPHOCYTES # BLD AUTO: 29.9 % — SIGNIFICANT CHANGE UP (ref 13–44)
MCHC RBC-ENTMCNC: 28 PG — SIGNIFICANT CHANGE UP (ref 27–34)
MCHC RBC-ENTMCNC: 32.3 GM/DL — SIGNIFICANT CHANGE UP (ref 32–36)
MCV RBC AUTO: 86.8 FL — SIGNIFICANT CHANGE UP (ref 80–100)
MONOCYTES # BLD AUTO: 0.69 K/UL — SIGNIFICANT CHANGE UP (ref 0–0.9)
MONOCYTES NFR BLD AUTO: 8.5 % — SIGNIFICANT CHANGE UP (ref 2–14)
NEUTROPHILS # BLD AUTO: 4.77 K/UL — SIGNIFICANT CHANGE UP (ref 1.8–7.4)
NEUTROPHILS NFR BLD AUTO: 58.6 % — SIGNIFICANT CHANGE UP (ref 43–77)
NRBC # BLD: 0 /100 WBCS — SIGNIFICANT CHANGE UP (ref 0–0)
PLATELET # BLD AUTO: 360 K/UL — SIGNIFICANT CHANGE UP (ref 150–400)
POTASSIUM SERPL-MCNC: 3.4 MMOL/L — LOW (ref 3.5–5.3)
POTASSIUM SERPL-SCNC: 3.4 MMOL/L — LOW (ref 3.5–5.3)
PROT SERPL-MCNC: 7.9 G/DL — SIGNIFICANT CHANGE UP (ref 6–8.3)
RBC # BLD: 4.1 M/UL — SIGNIFICANT CHANGE UP (ref 3.8–5.2)
RBC # FLD: 14.1 % — SIGNIFICANT CHANGE UP (ref 10.3–14.5)
SARS-COV-2 RNA SPEC QL NAA+PROBE: SIGNIFICANT CHANGE UP
SODIUM SERPL-SCNC: 143 MMOL/L — SIGNIFICANT CHANGE UP (ref 135–145)
TROPONIN I SERPL-MCNC: <0.015 NG/ML — SIGNIFICANT CHANGE UP (ref 0–0.04)
WBC # BLD: 8.13 K/UL — SIGNIFICANT CHANGE UP (ref 3.8–10.5)
WBC # FLD AUTO: 8.13 K/UL — SIGNIFICANT CHANGE UP (ref 3.8–10.5)

## 2021-10-11 PROCEDURE — 93010 ELECTROCARDIOGRAM REPORT: CPT

## 2021-10-11 PROCEDURE — 99285 EMERGENCY DEPT VISIT HI MDM: CPT

## 2021-10-11 RX ORDER — LABETALOL HCL 100 MG
20 TABLET ORAL ONCE
Refills: 0 | Status: COMPLETED | OUTPATIENT
Start: 2021-10-11 | End: 2021-10-11

## 2021-10-11 RX ADMIN — Medication 20 MILLIGRAM(S): at 22:59

## 2021-10-11 NOTE — ED ADULT NURSE NOTE - OBJECTIVE STATEMENT
SELENA TIRADO COVERING NOTES: AOX4 +ambulatory patient reports dizziness and high blood pressure x this morning. Denies any chest pains or shortness of breath

## 2021-10-11 NOTE — ED PROVIDER NOTE - IV ALTEPLASE EXCL ABS HIDDEN
Patient Instructions by Ant Rolon MD at 03/16/18 04:27 PM     Author:  Ant Rolon MD Service:  (none) Author Type:  Physician     Filed:  03/16/18 04:29 PM Encounter Date:  3/16/2018 Status:  Addendum     :  Ant Rolon MD (Physician)            Assessment and Plan:    Bronchospasm (airway reactivity/inflammation)   Plan: Stable and well controlled from a clinical standpoint on flovent. CXR showed R middle lobe infiltrate/atelectasis and airway inflammation 4/2017.    Increased cough triggered by acute sinusitis.      · Continue flovent 44 mcg 2 puffs twice daily with spacer/mask. Increase to 4 puffs twice daily for 1-2 weeks with colds/illnesses (as is the case presently).  · Continue albuterol 2 puffs every 4 hours as needed or 1 neb every 4 hours as needed     Chronic nonallergic rhinitis; Eustachian tube dysfunction (ETD); adenoidal enlargement; Acute sinusitis  Plan: Skin prick testing was negative to full pediatric aeroallergen panel 11/2017.  RAST was negative (<0.35) to the full peds aeroallergen panel 10/2017. Humoral (antibody) immune labs were normal for her age 10/2017 per Dr. Braswell.  Evaluated by Dr. Valdivia and recommended adenoidectomy and first tympanotomy with tubes 03/2018 - probably to be done 6/2018.    Nasal congestion, drainage now white/thick, and cough for 8d.     · Start Augmentin 400 mg/5ml: 400 mg (5 ml) twice daily for 10 days.   · Continue Nasacort 1 spray in each nostril once daily as needed.     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 Has previously taken amoxicillin since with no adverse reaction.    · Continue to strictly avoid Cefdinir  · Check SPT/IDT to penicillin in the future when appropriate.    History of impetigo  Plan: Mild upper philtrum redness today but no discharge.    · Continue vaseline to protect the area while healing.    Follow up: In August 2018 as scheduled, or after ENT procedure.  Status update next Monday.        Revision History        User Key Date/Time User Provider Type Action    > [N/A] 03/16/18 04:29 PM Ant Rolon MD Physician Addend     [N/A] 03/16/18 04:27 PM Ant Rolon MD Physician Sign             show

## 2021-10-11 NOTE — ED PROVIDER NOTE - CLINICAL SUMMARY MEDICAL DECISION MAKING FREE TEXT BOX
Pt with 2nd ED visit for uncontrolled HTN despite compliancy with Losartan. Will administer IV Labetalol and monitor. Pt with 2nd ED visit for uncontrolled HTN despite compliancy with Losartan. Will administer IV Labetalol and monitor.  238a BP improved. pt is very anxious about uncontrolled HTN at home. MAR and Dr. Cuello endorsed. Pt agrees with admission for cardiac monitoring.  I had a detailed discussion with the patient and/or guardian regarding the historical points, exam findings, and any diagnostic results supporting the admit diagnosis.

## 2021-10-11 NOTE — ED ADULT TRIAGE NOTE - WEIGHT IN KG
47.3 Alar Island Pedicle Flap Text: The defect edges were debeveled with a #15 scalpel blade.  Given the location of the defect, shape of the defect and the proximity to the alar rim an island pedicle advancement flap was deemed most appropriate.  Using a sterile surgical marker, an appropriate advancement flap was drawn incorporating the defect, outlining the appropriate donor tissue and placing the expected incisions within the nasal ala running parallel to the alar rim. The area thus outlined was incised with a #15 scalpel blade.  The skin margins were undermined minimally to an appropriate distance in all directions around the primary defect and laterally outward around the island pedicle utilizing iris scissors.  There was minimal undermining beneath the pedicle flap.

## 2021-10-11 NOTE — ED PROVIDER NOTE - OBJECTIVE STATEMENT
Pt states fells dizzy described as off balance. Pt check her BP and recorded SBP of 200 at 2pm. Pt was seen in ED yesterday for uncontrolled HTN, normal diagnostics recorded. Pt states took her Losartan 25mg at 8am today and at 1:30pm her BP was still elevated so pt took additional 50mg of losartan.   No LOC, no chest pain, no shortness of breath, no fever.

## 2021-10-11 NOTE — ED PROVIDER NOTE - NSICDXPASTMEDICALHX_GEN_ALL_CORE_FT
PAST MEDICAL HISTORY:  COPD (chronic obstructive pulmonary disease)     HTN (hypertension)     Sixth nerve palsy

## 2021-10-12 DIAGNOSIS — R42 DIZZINESS AND GIDDINESS: ICD-10-CM

## 2021-10-12 DIAGNOSIS — I10 ESSENTIAL (PRIMARY) HYPERTENSION: ICD-10-CM

## 2021-10-12 DIAGNOSIS — Z87.09 PERSONAL HISTORY OF OTHER DISEASES OF THE RESPIRATORY SYSTEM: ICD-10-CM

## 2021-10-12 DIAGNOSIS — E87.6 HYPOKALEMIA: ICD-10-CM

## 2021-10-12 DIAGNOSIS — I16.0 HYPERTENSIVE URGENCY: ICD-10-CM

## 2021-10-12 DIAGNOSIS — Z29.9 ENCOUNTER FOR PROPHYLACTIC MEASURES, UNSPECIFIED: ICD-10-CM

## 2021-10-12 LAB
ANION GAP SERPL CALC-SCNC: 7 MMOL/L — SIGNIFICANT CHANGE UP (ref 5–17)
APPEARANCE UR: CLEAR — SIGNIFICANT CHANGE UP
BILIRUB UR-MCNC: NEGATIVE — SIGNIFICANT CHANGE UP
BUN SERPL-MCNC: 11 MG/DL — SIGNIFICANT CHANGE UP (ref 7–18)
CALCIUM SERPL-MCNC: 9 MG/DL — SIGNIFICANT CHANGE UP (ref 8.4–10.5)
CHLORIDE SERPL-SCNC: 104 MMOL/L — SIGNIFICANT CHANGE UP (ref 96–108)
CO2 SERPL-SCNC: 28 MMOL/L — SIGNIFICANT CHANGE UP (ref 22–31)
COLOR SPEC: YELLOW — SIGNIFICANT CHANGE UP
CREAT SERPL-MCNC: 1.03 MG/DL — SIGNIFICANT CHANGE UP (ref 0.5–1.3)
DIFF PNL FLD: NEGATIVE — SIGNIFICANT CHANGE UP
GLUCOSE SERPL-MCNC: 195 MG/DL — HIGH (ref 70–99)
GLUCOSE UR QL: NEGATIVE — SIGNIFICANT CHANGE UP
HCT VFR BLD CALC: 36.4 % — SIGNIFICANT CHANGE UP (ref 34.5–45)
HGB BLD-MCNC: 11.5 G/DL — SIGNIFICANT CHANGE UP (ref 11.5–15.5)
KETONES UR-MCNC: NEGATIVE — SIGNIFICANT CHANGE UP
LEUKOCYTE ESTERASE UR-ACNC: ABNORMAL
MAGNESIUM SERPL-MCNC: 2.2 MG/DL — SIGNIFICANT CHANGE UP (ref 1.6–2.6)
MCHC RBC-ENTMCNC: 27.9 PG — SIGNIFICANT CHANGE UP (ref 27–34)
MCHC RBC-ENTMCNC: 31.6 GM/DL — LOW (ref 32–36)
MCV RBC AUTO: 88.3 FL — SIGNIFICANT CHANGE UP (ref 80–100)
NITRITE UR-MCNC: NEGATIVE — SIGNIFICANT CHANGE UP
NRBC # BLD: 0 /100 WBCS — SIGNIFICANT CHANGE UP (ref 0–0)
PH UR: 7 — SIGNIFICANT CHANGE UP (ref 5–8)
PHOSPHATE SERPL-MCNC: 2.5 MG/DL — SIGNIFICANT CHANGE UP (ref 2.5–4.5)
PLATELET # BLD AUTO: 358 K/UL — SIGNIFICANT CHANGE UP (ref 150–400)
POTASSIUM SERPL-MCNC: 3.8 MMOL/L — SIGNIFICANT CHANGE UP (ref 3.5–5.3)
POTASSIUM SERPL-SCNC: 3.8 MMOL/L — SIGNIFICANT CHANGE UP (ref 3.5–5.3)
PROT UR-MCNC: NEGATIVE — SIGNIFICANT CHANGE UP
RBC # BLD: 4.12 M/UL — SIGNIFICANT CHANGE UP (ref 3.8–5.2)
RBC # FLD: 14.4 % — SIGNIFICANT CHANGE UP (ref 10.3–14.5)
SODIUM SERPL-SCNC: 139 MMOL/L — SIGNIFICANT CHANGE UP (ref 135–145)
SP GR SPEC: 1 — LOW (ref 1.01–1.02)
TROPONIN I SERPL-MCNC: <0.015 NG/ML — SIGNIFICANT CHANGE UP (ref 0–0.04)
UROBILINOGEN FLD QL: NEGATIVE — SIGNIFICANT CHANGE UP
WBC # BLD: 6.49 K/UL — SIGNIFICANT CHANGE UP (ref 3.8–10.5)
WBC # FLD AUTO: 6.49 K/UL — SIGNIFICANT CHANGE UP (ref 3.8–10.5)

## 2021-10-12 PROCEDURE — 99223 1ST HOSP IP/OBS HIGH 75: CPT

## 2021-10-12 RX ORDER — LANOLIN ALCOHOL/MO/W.PET/CERES
3 CREAM (GRAM) TOPICAL AT BEDTIME
Refills: 0 | Status: DISCONTINUED | OUTPATIENT
Start: 2021-10-12 | End: 2021-10-14

## 2021-10-12 RX ORDER — ALBUTEROL 90 UG/1
2 AEROSOL, METERED ORAL EVERY 6 HOURS
Refills: 0 | Status: DISCONTINUED | OUTPATIENT
Start: 2021-10-12 | End: 2021-10-14

## 2021-10-12 RX ORDER — POTASSIUM CHLORIDE 20 MEQ
40 PACKET (EA) ORAL EVERY 4 HOURS
Refills: 0 | Status: DISCONTINUED | OUTPATIENT
Start: 2021-10-12 | End: 2021-10-12

## 2021-10-12 RX ORDER — BUDESONIDE AND FORMOTEROL FUMARATE DIHYDRATE 160; 4.5 UG/1; UG/1
2 AEROSOL RESPIRATORY (INHALATION)
Refills: 0 | Status: DISCONTINUED | OUTPATIENT
Start: 2021-10-12 | End: 2021-10-14

## 2021-10-12 RX ORDER — LOSARTAN POTASSIUM 100 MG/1
25 TABLET, FILM COATED ORAL DAILY
Refills: 0 | Status: DISCONTINUED | OUTPATIENT
Start: 2021-10-12 | End: 2021-10-14

## 2021-10-12 RX ORDER — POTASSIUM CHLORIDE 20 MEQ
40 PACKET (EA) ORAL EVERY 4 HOURS
Refills: 0 | Status: COMPLETED | OUTPATIENT
Start: 2021-10-12 | End: 2021-10-12

## 2021-10-12 RX ORDER — ENOXAPARIN SODIUM 100 MG/ML
40 INJECTION SUBCUTANEOUS DAILY
Refills: 0 | Status: DISCONTINUED | OUTPATIENT
Start: 2021-10-12 | End: 2021-10-14

## 2021-10-12 RX ADMIN — ALBUTEROL 2 PUFF(S): 90 AEROSOL, METERED ORAL at 21:45

## 2021-10-12 RX ADMIN — Medication 3 MILLIGRAM(S): at 21:45

## 2021-10-12 RX ADMIN — Medication 40 MILLIEQUIVALENT(S): at 11:52

## 2021-10-12 RX ADMIN — ENOXAPARIN SODIUM 40 MILLIGRAM(S): 100 INJECTION SUBCUTANEOUS at 11:51

## 2021-10-12 RX ADMIN — Medication 40 MILLIEQUIVALENT(S): at 16:32

## 2021-10-12 RX ADMIN — LOSARTAN POTASSIUM 25 MILLIGRAM(S): 100 TABLET, FILM COATED ORAL at 11:52

## 2021-10-12 RX ADMIN — BUDESONIDE AND FORMOTEROL FUMARATE DIHYDRATE 2 PUFF(S): 160; 4.5 AEROSOL RESPIRATORY (INHALATION) at 16:32

## 2021-10-12 RX ADMIN — BUDESONIDE AND FORMOTEROL FUMARATE DIHYDRATE 2 PUFF(S): 160; 4.5 AEROSOL RESPIRATORY (INHALATION) at 21:46

## 2021-10-12 NOTE — H&P ADULT - NSHPLABSRESULTS_GEN_ALL_CORE
LABS:                        11.5   8.13  )-----------( 360      ( 11 Oct 2021 22:40 )             35.6                         11.0   9.33  )-----------( 352      ( 10 Oct 2021 18:57 )             33.9     10-11    143  |  107  |  9   ----------------------------<  78  3.4<L>   |  28  |  1.10    Ca    8.9      11 Oct 2021 22:40  Mg     2.0     10-10    TPro  7.9  /  Alb  3.2<L>  /  TBili  0.3  /  DBili  x   /  AST  20  /  ALT  23  /  AlkPhos  74  10-11      Urinalysis Basic - ( 12 Oct 2021 02:32 )    Color: Yellow / Appearance: Clear / S.005 / pH: x  Gluc: x / Ketone: Negative  / Bili: Negative / Urobili: Negative   Blood: x / Protein: Negative / Nitrite: Negative   Leuk Esterase: Trace / RBC: 0-2 /HPF / WBC 3-5 /HPF   Sq Epi: x / Non Sq Epi: Few /HPF / Bacteria: Few /HPF      LIVER FUNCTIONS - ( 11 Oct 2021 22:40 )  Alb: 3.2 g/dL / Pro: 7.9 g/dL / ALK PHOS: 74 U/L / ALT: 23 U/L DA / AST: 20 U/L / GGT: x         LIVER FUNCTIONS - ( 10 Oct 2021 18:57 )  Alb: 3.2 g/dL / Pro: 7.8 g/dL / ALK PHOS: 76 U/L / ALT: 24 U/L DA / AST: 25 U/L / GGT: x           Lipase, Serum: 531 U/L (10-10-21 @ 18:57)

## 2021-10-12 NOTE — H&P ADULT - PROBLEM SELECTOR PLAN 5
IMPROVE VTE Individual Risk Assessment  RISK                                                                Points  [  ] Previous VTE                                                  3  [  ] Thrombophilia                                               2  [  ] Lower limb paralysis                                      2        (unable to hold up >15 seconds)    [  ] Current Cancer                                              2         (within 6 months)  [  ] Immobilization > 24 hrs                                1  [  ] ICU/CCU stay > 24 hours                              1  [  ] Age > 60                                                      1  IMPROVE VTE Score ___2______    PPI for GI prophylaxis  Lovenox for DVT PPX

## 2021-10-12 NOTE — PROGRESS NOTE ADULT - PROBLEM SELECTOR PLAN 2
- Patient came c/o dizziness most likely 2/2 uncontrolled hypertension   - EKG NSR  - troponin x 3 negative   - CTH showed remote right basal lacunar infarct most likely 2/2 uncontrolled hypertension, no neuro deficits on exam   - Admitted to telemetry to r/o any arrhthymias   - F/u Orthostatics   - Fall precautions - Patient came c/o dizziness most likely 2/2 uncontrolled hypertension   - EKG NSR  - troponin x 3 negative   - CTH showed remote right basal lacunar infarct most likely 2/2 uncontrolled hypertension, no neuro deficits on exam   - Admitted to telemetry to r/o any arrhthymias -> d/c tele  - F/u Orthostatics   - Fall precautions - Patient came c/o dizziness most likely 2/2 uncontrolled hypertension   - EKG NSR  - troponin x 3 negative   - CTH negative  - Admitted to telemetry to r/o any arrhthymias -> d/c tele  - F/u Orthostatics   - Fall precautions

## 2021-10-12 NOTE — PROGRESS NOTE ADULT - SUBJECTIVE AND OBJECTIVE BOX
PGY-1 Progress Note discussed with attending    PAGER #: [[phone]] TILL 5:00 PM  PLEASE CONTACT ON CALL TEAM:  - On Call Team (Please refer to Claudia) FROM 5:00 PM - 8:30PM  - Nightfloat Team FROM 8:30 -7:30 AM    CHIEF COMPLAINT & BRIEF HOSPITAL COURSE:    INTERVAL HPI/OVERNIGHT EVENTS:   Patient seen and examined at bedside. No acute events overnight.    REVIEW OF SYSTEMS:  CONSTITUTIONAL: No fever, weight loss, or fatigue  RESPIRATORY: No cough, wheezing, chills or hemoptysis; No shortness of breath  CARDIOVASCULAR: No chest pain, palpitations, dizziness, or leg swelling  GASTROINTESTINAL: No abdominal pain. No nausea, vomiting, or hematemesis; No diarrhea or constipation. No melena or hematochezia.  GENITOURINARY: No dysuria or hematuria, urinary frequency  NEUROLOGICAL: No headaches, memory loss, loss of strength, numbness, or tremors  SKIN: No itching, burning, rashes, or lesions     ALBUTerol    90 MICROgram(s) HFA Inhaler 2 Puff(s) Inhalation every 6 hours PRN  budesonide  80 MICROgram(s)/formoterol 4.5 MICROgram(s) Inhaler 2 Puff(s) Inhalation two times a day  enoxaparin Injectable 40 milliGRAM(s) SubCutaneous daily  losartan 25 milliGRAM(s) Oral daily  potassium chloride    Tablet ER 40 milliEquivalent(s) Oral every 4 hours      Vital Signs Last 24 Hrs  T(C): 36.7 (12 Oct 2021 11:11), Max: 37.1 (12 Oct 2021 10:03)  T(F): 98 (12 Oct 2021 11:11), Max: 98.7 (12 Oct 2021 10:03)  HR: 81 (12 Oct 2021 11:11) (62 - 82)  BP: 165/71 (12 Oct 2021 11:11) (132/63 - 207/93)  BP(mean): --  RR: 18 (12 Oct 2021 11:11) (17 - 18)  SpO2: 98% (12 Oct 2021 11:11) (97% - 98%)    PHYSICAL EXAMINATION:  GENERAL: NAD  HEAD:  Atraumatic, Normocephalic  EYES:  conjunctiva and sclera clear  NECK: Supple, No JVD, Normal thyroid  CHEST/LUNG: Clear to auscultation; No rales, rhonchi, wheezing, or rubs  HEART: Regular rate and rhythm; No murmurs, rubs, or gallops  ABDOMEN: Soft, Nontender, Nondistended; Bowel sounds present  NERVOUS SYSTEM:  Alert & Oriented X3,   EXTREMITIES:  2+ Peripheral Pulses, No clubbing, cyanosis, or edema  SKIN: warm dry                          11.5   6.49  )-----------( 358      ( 12 Oct 2021 11:15 )             36.4     10-12    139  |  104  |  11  ----------------------------<  195<H>  3.8   |  28  |  1.03    Ca    9.0      12 Oct 2021 11:15  Phos  2.5     10-12  Mg     2.2     10-12    TPro  7.9  /  Alb  3.2<L>  /  TBili  0.3  /  DBili  x   /  AST  20  /  ALT  23  /  AlkPhos  74  10-11    LIVER FUNCTIONS - ( 11 Oct 2021 22:40 )  Alb: 3.2 g/dL / Pro: 7.9 g/dL / ALK PHOS: 74 U/L / ALT: 23 U/L DA / AST: 20 U/L / GGT: x           CARDIAC MARKERS ( 12 Oct 2021 11:15 )  <0.015 ng/mL / x     / x     / x     / x      CARDIAC MARKERS ( 11 Oct 2021 22:40 )  <0.015 ng/mL / x     / x     / x     / x      CARDIAC MARKERS ( 10 Oct 2021 18:57 )  <0.015 ng/mL / x     / x     / x     / x              CAPILLARY BLOOD GLUCOSE      RADIOLOGY & ADDITIONAL TESTS:           PGY-1 Progress Note discussed with attending    PAGER #: [[phone]] TILL 5:00 PM  PLEASE CONTACT ON CALL TEAM:  - On Call Team (Please refer to Claudia) FROM 5:00 PM - 8:30PM  - Nightfloat Team FROM 8:30 -7:30 AM    CHIEF COMPLAINT & BRIEF HOSPITAL COURSE:    INTERVAL HPI/OVERNIGHT EVENTS:   Patient seen and examined at bedside. No acute events overnight. Patient does not have dizziness as of now.    REVIEW OF SYSTEMS:  CONSTITUTIONAL: No fever, weight loss, or fatigue  RESPIRATORY: No cough, wheezing, chills or hemoptysis; No shortness of breath  CARDIOVASCULAR: No chest pain, palpitations, dizziness, or leg swelling  GASTROINTESTINAL: No abdominal pain. No nausea, vomiting, or hematemesis; No diarrhea or constipation. No melena or hematochezia.  GENITOURINARY: No dysuria or hematuria, urinary frequency  NEUROLOGICAL: No headaches, memory loss, loss of strength, numbness, or tremors  SKIN: No itching, burning, rashes, or lesions     ALBUTerol    90 MICROgram(s) HFA Inhaler 2 Puff(s) Inhalation every 6 hours PRN  budesonide  80 MICROgram(s)/formoterol 4.5 MICROgram(s) Inhaler 2 Puff(s) Inhalation two times a day  enoxaparin Injectable 40 milliGRAM(s) SubCutaneous daily  losartan 25 milliGRAM(s) Oral daily  potassium chloride    Tablet ER 40 milliEquivalent(s) Oral every 4 hours      Vital Signs Last 24 Hrs  T(C): 36.7 (12 Oct 2021 11:11), Max: 37.1 (12 Oct 2021 10:03)  T(F): 98 (12 Oct 2021 11:11), Max: 98.7 (12 Oct 2021 10:03)  HR: 81 (12 Oct 2021 11:11) (62 - 82)  BP: 165/71 (12 Oct 2021 11:11) (132/63 - 207/93)  BP(mean): --  RR: 18 (12 Oct 2021 11:11) (17 - 18)  SpO2: 98% (12 Oct 2021 11:11) (97% - 98%)    PHYSICAL EXAMINATION:  GENERAL: NAD  HEAD:  Atraumatic, Normocephalic  EYES:  conjunctiva and sclera clear  NECK: Supple, No JVD, Normal thyroid  CHEST/LUNG: Clear to auscultation; No rales, rhonchi, wheezing, or rubs  HEART: Regular rate and rhythm; No murmurs, rubs, or gallops  ABDOMEN: Soft, Nontender, Nondistended; Bowel sounds present  NERVOUS SYSTEM:  Alert & Oriented X3,   EXTREMITIES:  2+ Peripheral Pulses, No clubbing, cyanosis, or edema  SKIN: warm dry                          11.5   6.49  )-----------( 358      ( 12 Oct 2021 11:15 )             36.4     10-12    139  |  104  |  11  ----------------------------<  195<H>  3.8   |  28  |  1.03    Ca    9.0      12 Oct 2021 11:15  Phos  2.5     10-12  Mg     2.2     10-12    TPro  7.9  /  Alb  3.2<L>  /  TBili  0.3  /  DBili  x   /  AST  20  /  ALT  23  /  AlkPhos  74  10-11    LIVER FUNCTIONS - ( 11 Oct 2021 22:40 )  Alb: 3.2 g/dL / Pro: 7.9 g/dL / ALK PHOS: 74 U/L / ALT: 23 U/L DA / AST: 20 U/L / GGT: x           CARDIAC MARKERS ( 12 Oct 2021 11:15 )  <0.015 ng/mL / x     / x     / x     / x      CARDIAC MARKERS ( 11 Oct 2021 22:40 )  <0.015 ng/mL / x     / x     / x     / x      CARDIAC MARKERS ( 10 Oct 2021 18:57 )  <0.015 ng/mL / x     / x     / x     / x              CAPILLARY BLOOD GLUCOSE      RADIOLOGY & ADDITIONAL TESTS:           PGY-1 Progress Note discussed with attending    PAGER #: [[phone]] TILL 5:00 PM  PLEASE CONTACT ON CALL TEAM:  - On Call Team (Please refer to Claudia) FROM 5:00 PM - 8:30PM  - Nightfloat Team FROM 8:30 -7:30 AM    CHIEF COMPLAINT & BRIEF HOSPITAL COURSE:    INTERVAL HPI/OVERNIGHT EVENTS:   Patient seen and examined at bedside. No acute events overnight. Patient does not have dizziness as of now.    REVIEW OF SYSTEMS:  CONSTITUTIONAL: No fever, weight loss, or fatigue  RESPIRATORY: No cough, wheezing, chills or hemoptysis; No shortness of breath  CARDIOVASCULAR: No chest pain, palpitations, dizziness, or leg swelling  GASTROINTESTINAL: No abdominal pain. No nausea, vomiting, or hematemesis; No diarrhea or constipation. No melena or hematochezia.  GENITOURINARY: No dysuria or hematuria, urinary frequency  NEUROLOGICAL: No headaches, memory loss, loss of strength, numbness, or tremors  SKIN: No itching, burning, rashes, or lesions     ALBUTerol    90 MICROgram(s) HFA Inhaler 2 Puff(s) Inhalation every 6 hours PRN  budesonide  80 MICROgram(s)/formoterol 4.5 MICROgram(s) Inhaler 2 Puff(s) Inhalation two times a day  enoxaparin Injectable 40 milliGRAM(s) SubCutaneous daily  losartan 25 milliGRAM(s) Oral daily  potassium chloride    Tablet ER 40 milliEquivalent(s) Oral every 4 hours      Vital Signs Last 24 Hrs  T(C): 36.7 (12 Oct 2021 11:11), Max: 37.1 (12 Oct 2021 10:03)  T(F): 98 (12 Oct 2021 11:11), Max: 98.7 (12 Oct 2021 10:03)  HR: 81 (12 Oct 2021 11:11) (62 - 82)  BP: 165/71 (12 Oct 2021 11:11) (132/63 - 207/93)  BP(mean): --  RR: 18 (12 Oct 2021 11:11) (17 - 18)  SpO2: 98% (12 Oct 2021 11:11) (97% - 98%)    PHYSICAL EXAMINATION:  GENERAL: NAD  HEAD:  Atraumatic, Normocephalic  EYES:  conjunctiva and sclera clear  NECK: Supple, No JVD, Normal thyroid  CHEST/LUNG: Bilateral rhonchi+crackles;  No rales, rhonchi, wheezing, or rubs  HEART: Regular rate and rhythm; No murmurs, rubs, or gallops  ABDOMEN: Soft, Nontender, Nondistended; Bowel sounds present  NERVOUS SYSTEM:  Alert & Oriented X3,   EXTREMITIES:  2+ Peripheral Pulses, No clubbing, cyanosis, or edema  SKIN: warm dry                          11.5   6.49  )-----------( 358      ( 12 Oct 2021 11:15 )             36.4     10-12    139  |  104  |  11  ----------------------------<  195<H>  3.8   |  28  |  1.03    Ca    9.0      12 Oct 2021 11:15  Phos  2.5     10-12  Mg     2.2     10-12    TPro  7.9  /  Alb  3.2<L>  /  TBili  0.3  /  DBili  x   /  AST  20  /  ALT  23  /  AlkPhos  74  10-11    LIVER FUNCTIONS - ( 11 Oct 2021 22:40 )  Alb: 3.2 g/dL / Pro: 7.9 g/dL / ALK PHOS: 74 U/L / ALT: 23 U/L DA / AST: 20 U/L / GGT: x           CARDIAC MARKERS ( 12 Oct 2021 11:15 )  <0.015 ng/mL / x     / x     / x     / x      CARDIAC MARKERS ( 11 Oct 2021 22:40 )  <0.015 ng/mL / x     / x     / x     / x      CARDIAC MARKERS ( 10 Oct 2021 18:57 )  <0.015 ng/mL / x     / x     / x     / x              CAPILLARY BLOOD GLUCOSE      RADIOLOGY & ADDITIONAL TESTS:

## 2021-10-12 NOTE — H&P ADULT - PROBLEM SELECTOR PLAN 3
- Patient presented with asymptomatic hypokalemia K 3.4 most likely 2/2 poor oral intake   - K replaced orally  - Monitor electrolytes and replace as needed

## 2021-10-12 NOTE — H&P ADULT - ATTENDING COMMENTS
Pt seen and examined. Case discussed with MAR.  66 year old woman with PMH of HTN previously controlled on single medication presenting to the ED the 2nd time in 2 days on account of episodes of dizziness, unsteadiness on her feet, headaches and very elevated BP. She took 2 doses of meds yesterday instead of one and still did not relieve the symptoms or the BP.   Her compliance is suboptimal at best and she also takes steroids for COPD management.    Vital Signs Last 24 Hrs  T(C): 37 (11 Oct 2021 23:34), Max: 37 (11 Oct 2021 23:34)  T(F): 98.6 (11 Oct 2021 23:34), Max: 98.6 (11 Oct 2021 23:34)  HR: 62 (12 Oct 2021 02:34) (62 - 82)  BP: 132/63 (12 Oct 2021 02:34) (132/63 - 207/93)  RR: 18 (12 Oct 2021 02:34) (18 - 18)  SpO2: 98% (12 Oct 2021 02:34) (97% - 98%)    Labs                         11.5   8.13  )-----------( 360      ( 11 Oct 2021 22:40 )             35.6     10-11    143  |  107  |  9   ----------------------------<  78  3.4<L>   |  28  |  1.10    Ca    8.9      11 Oct 2021 22:40  Mg     2.0     10-10    TPro  7.9  /  Alb  3.2<L>  /  TBili  0.3  /  DBili  x   /  AST  20  /  ALT  23  /  AlkPhos  74  10-11    UA - unremarkable     CT head - unremarkable    EKG - NSR   CARDIAC MARKERS ( 11 Oct 2021 22:40 )  <0.015 ng/mL / x     / x     / x     / x      CARDIAC MARKERS ( 10 Oct 2021 18:57 )  <0.015 ng/mL / x     / x     / x     / x            CXR - independent review  narrow mediastinum with hyperextended lung field c/w COPD  No acute finding of note.    Impression  -  Hypertensive urgency   -  Uncontrolled Hypertension   -  Pre-syncope     Plan   - Admit to telemetry for observation.  - Resume home medications and observe BP trend to note if BP is controlled on 1 BP med ( non compliance) vs uncontrolled ( need to increase dose of current BP meds or add a secondary meds vs monitor the effect of steroids on patient's BP)   -  ECHO   - Cardiology consult

## 2021-10-12 NOTE — CONSULT NOTE ADULT - SUBJECTIVE AND OBJECTIVE BOX
Time of visit:    CHIEF COMPLAINT: Patient is a 66y old  Female who presents with a chief complaint of UNCONTROLLED HYPERTENSION (12 Oct 2021 13:47)      HPI:  66 year old female from home, ambulates independently, has past medical hx of hypertension, COPD and surgical hx of partial thyroidectomy and total hysterectomy came to ED c/o uncontrolled hypertension associated to dizziness since Saturday. Patient refers she only takes Losartan when she feels sick or has a headache otherwise does not take it on a daily basis. 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 Patient denies chest pain, sob, palpitations, abdominal pain,  symptoms.  (12 Oct 2021 04:25)   Patient seen and examined.     PAST MEDICAL & SURGICAL HISTORY:  COPD (chronic obstructive pulmonary disease)    Sixth nerve palsy    HTN (hypertension)    S/P RANDOLPH-BSO    S/P thyroid surgery        Allergies    Motrin (Swelling)    Intolerances        MEDICATIONS  (STANDING):  budesonide  80 MICROgram(s)/formoterol 4.5 MICROgram(s) Inhaler 2 Puff(s) Inhalation two times a day  enoxaparin Injectable 40 milliGRAM(s) SubCutaneous daily  losartan 25 milliGRAM(s) Oral daily  melatonin 3 milliGRAM(s) Oral at bedtime      MEDICATIONS  (PRN):  ALBUTerol    90 MICROgram(s) HFA Inhaler 2 Puff(s) Inhalation every 6 hours PRN Shortness of Breath and/or Wheezing   Medications up to date at time of exam.    Medications up to date at time of exam.    FAMILY HISTORY:      SOCIAL HISTORY  Smoking History: [   ] smoking/smoke exposure, [  x ] former smoker  Living Condition: [   ] apartment, [   ] private house  Work History: retired PICU Nurse   Travel History: denies recent travel  Illicit Substance Use: denies  Alcohol Use: denies    REVIEW OF SYSTEMS:    CONSTITUTIONAL:  denies fevers, chills, sweats, weight loss    HEENT:  denies diplopia or blurred vision, sore throat or runny nose.    CARDIOVASCULAR:  denies pressure, squeezing, tightness, or heaviness about the chest; no palpitations.    RESPIRATORY:  denies SOB, cough, BE, wheezing.    GASTROINTESTINAL:  denies abdominal pain, nausea, vomiting or diarrhea.    GENITOURINARY: denies dysuria, frequency or urgency.    NEUROLOGIC:  denies numbness, tingling, seizures or weakness.    PSYCHIATRIC:  denies disorder of thought or mood.    MSK: denies swelling, redness      PHYSICAL EXAMINATION:    GENERAL: The patient is a well-developed, well-nourished, in no apparent distress.     Vital Signs Last 24 Hrs  T(C): 37 (12 Oct 2021 19:53), Max: 37.2 (12 Oct 2021 16:07)  T(F): 98.6 (12 Oct 2021 19:53), Max: 98.9 (12 Oct 2021 16:07)  HR: 70 (12 Oct 2021 19:53) (62 - 82)  BP: 114/66 (12 Oct 2021 19:53) (104/66 - 207/93)  BP(mean): --  RR: 189 (12 Oct 2021 19:53) (17 - 189)  SpO2: 95% (12 Oct 2021 16:07) (95% - 98%)   (if applicable)    Chest Tube (if applicable)    HEENT: Head is normocephalic and atraumatic. Extraocular muscles are intact. Mucous membranes are moist.     NECK: Supple, no palpable adenopathy.    LUNGS: Clear to auscultation, no wheezing, rales, or rhonchi.    HEART: Regular rate and rhythm without murmur.    ABDOMEN: Soft, nontender, and nondistended.  No hepatosplenomegaly is noted.    RENAL: No difficulty voiding, no pelvic pain    EXTREMITIES: Without any cyanosis, clubbing, rash, lesions or edema.    NEUROLOGIC: Awake, alert, oriented, grossly intact    SKIN: Warm, dry, good turgor.      LABS:                        11.5   6.49  )-----------( 358      ( 12 Oct 2021 11:15 )             36.4     10-12    139  |  104  |  11  ----------------------------<  195<H>  3.8   |  28  |  1.03    Ca    9.0      12 Oct 2021 11:15  Phos  2.5     10-12  Mg     2.2     10-12    TPro  7.9  /  Alb  3.2<L>  /  TBili  0.3  /  DBili  x   /  AST  20  /  ALT  23  /  AlkPhos  74  10-11      Urinalysis Basic - ( 12 Oct 2021 02:32 )    Color: Yellow / Appearance: Clear / S.005 / pH: x  Gluc: x / Ketone: Negative  / Bili: Negative / Urobili: Negative   Blood: x / Protein: Negative / Nitrite: Negative   Leuk Esterase: Trace / RBC: 0-2 /HPF / WBC 3-5 /HPF   Sq Epi: x / Non Sq Epi: Few /HPF / Bacteria: Few /HPF        CARDIAC MARKERS ( 12 Oct 2021 11:15 )  <0.015 ng/mL / x     / x     / x     / x      CARDIAC MARKERS ( 11 Oct 2021 22:40 )  <0.015 ng/mL / x     / x     / x     / x            Serum Pro-Brain Natriuretic Peptide: 178 pg/mL (10-10-21 @ 18:57)          MICROBIOLOGY: (if applicable)    RADIOLOGY & ADDITIONAL STUDIES:  EKG:   CXR:< from: Xray Chest 1 View- PORTABLE-Urgent (10.10.21 @ 18:58) >    EXAM:  XR CHEST PORTABLE URGENT 1V                            PROCEDURE DATE:  10/10/2021          INTERPRETATION:  Portable chest radiograph    CLINICAL INFORMATION: Chest pain    TECHNIQUE:  Portable  AP view of the chest.    COMPARISON: hest available for review.    FINDINGS:    The lungs show chronic LEFT medial basilar bronchiectasis. Remaining lung parenchyma clear. No pneumothorax.    The heart and mediastinum size and configuration are within normal limits.    Visualized osseous structures are intact.    IMPRESSION:   Chronic medial basilar bronchiectasis..    --- End of Report ---            PARUL WHITNEY MD; Attending Radiologist  This document has been electronically signed. 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  Pat stated that she is non compliant with her meds..skip doses .  COPD is stable . BP is better control . CXR with left base bronchiectasis       Sugg  -continue meds   -i strongly encourage compliance with meds  -no need for steroids or bronchodilators at this time   -continue care as per primary team     discussed care with PCP.. pat is known to be non compliant with meds   -will f/u with ,my office

## 2021-10-12 NOTE — H&P ADULT - PROBLEM SELECTOR PLAN 1
- On admission, patients /93 most likely 2/2 medication non compliance   - Treated with Labetalol 20 mg IVP in ED  - On Losartan 25 mg daily at home   - Target reduction in MAP no more than 25-30% in the first 1-4 hours then BP < =160/100    - Resumed home meds  - Monitor BP and adjust meds as needed  - Echo 2/2021 showed concentric LV remodeling. Hyperdynamic left ventricular systolic function (EF >70%). Mild diastolic dysfunction (stage I) and stress test was normal   - Cardio consult, no need to repeat echo - On admission, patients /93 most likely 2/2 medication non compliance   - Treated with Labetalol 20 mg IVP in ED  - On Losartan 25 mg daily at home   - Target reduction in MAP no more than 25-30% in the first 1-4 hours then BP < =160/100    - Resumed home meds  - Monitor BP and adjust meds as needed  - Echo 2/2021 showed concentric LV remodeling. Hyperdynamic left ventricular systolic function (EF >70%). Mild diastolic dysfunction (stage I) and stress test was normal   - No need to repeat echo

## 2021-10-12 NOTE — H&P ADULT - PROBLEM SELECTOR PLAN 2
- Patient came c/o dizziness most likely 2/2 uncontrolled hypertension   - EKG NSR, troponin x 1 negative   - CTH showed remote right basal lacunar infarct  - Admitted to telemetry to r/o any arrhthymias   - F/u Orthostatics   - Fall precautions   - Cardio consult and Neuro consult - Patient came c/o dizziness most likely 2/2 uncontrolled hypertension   - EKG NSR, troponin x 1 negative   - CTH showed remote right basal lacunar infarct most likely 2/2 uncontrolled hypertension, no neuro deficits on exam   - Admitted to telemetry to r/o any arrhthymias   - F/u Orthostatics   - Fall precautions

## 2021-10-12 NOTE — H&P ADULT - ASSESSMENT
66 year old female has past medical hx of hypertension, COPD and surgical hx of partial thyroidectomy and total hysterectomy came to ED c/o uncontrolled hypertension associated to dizziness since Saturday. Patient admitted for uncontrolled hypertension and dizziness

## 2021-10-12 NOTE — H&P ADULT - NSHPPHYSICALEXAM_GEN_ALL_CORE
PHYSICAL EXAMINATION:  GENERAL: NAD, well built  HEAD:  Atraumatic, Normocephalic  EYES:  conjunctiva and sclera clear  NECK: Supple, No JVD, Normal thyroid  CHEST/LUNG: Clear to auscultation. Clear to percussion bilaterally; No rales, rhonchi, wheezing, or rubs  HEART: Regular rate and rhythm; No murmurs, rubs, or gallops  ABDOMEN: Soft, Nontender, Nondistended; Bowel sounds present  NERVOUS SYSTEM:  Alert & Oriented X3,    EXTREMITIES:  2+ Peripheral Pulses, No clubbing, cyanosis, or edema  SKIN: warm dry ICU Vital Signs Last 24 Hrs  T(C): 36.6 (12 Oct 2021 07:49), Max: 37 (11 Oct 2021 23:34)  T(F): 97.8 (12 Oct 2021 07:49), Max: 98.6 (11 Oct 2021 23:34)  HR: 67 (12 Oct 2021 07:49) (62 - 82)  BP: 152/75 (12 Oct 2021 07:49) (132/63 - 207/93)  RR: 17 (12 Oct 2021 07:49) (17 - 18)  SpO2: 97% (12 Oct 2021 07:49) (97% - 98%)    GENERAL: NAD, average weight, sat well on RA  HEAD:  Atraumatic, Normocephalic  EYES:  conjunctiva and sclera clear  NECK: Supple, No JVD, Normal thyroid  CHEST/LUNG: Clear to auscultation. Clear to percussion bilaterally; No rales, rhonchi, wheezing, or rubs  HEART: Regular rate and rhythm; No murmurs, rubs, or gallops  ABDOMEN: Soft, Nontender, Nondistended; Bowel sounds present, no pain or masses on palpation   NERVOUS SYSTEM:  Alert & Oriented X3  EXTREMITIES:  2+ Peripheral Pulses, No clubbing, cyanosis, or edema  : voiding well   SKIN: warm, dry

## 2021-10-12 NOTE — PROGRESS NOTE ADULT - PROBLEM SELECTOR PLAN 1
- On admission, patients /93 most likely 2/2 medication non compliance   - Treated with Labetalol 20 mg IVP in ED  - On Losartan 25 mg daily at home   - Target reduction in MAP no more than 25-30% in the first 1-4 hours then BP < =160/100    - Resumed home meds  - Monitor BP and adjust meds as needed  - Echo 2/2021 showed concentric LV remodeling. Hyperdynamic left ventricular systolic function (EF >70%). Mild diastolic dysfunction (stage I) and stress test was normal   - No need to repeat echo - On admission, patients /93 most likely 2/2 medication non compliance   - Treated with Labetalol 20 mg IVP in ED  - On Losartan 25 mg daily at home   - Target reduction in MAP no more than 25-30% in the first 1-4 hours then BP < =160/100    - Resumed home meds. Will adjust as needed  - Echo 2/2021 showed concentric LV remodeling. Hyperdynamic left ventricular systolic function (EF >70%). Mild diastolic dysfunction (stage I) and stress test was normal   - No need to repeat echo  - *If hypertensive overnight at 180~190SBP, can give hydralazine 2.5mg push

## 2021-10-12 NOTE — H&P ADULT - HISTORY OF PRESENT ILLNESS
66 year old female from home, ambulates independently, has past medical hx of hypertension, COPD and surgical hx of partial thyroidectomy and total hysterectomy came to ED c/o uncontrolled hypertension associated to dizziness since Saturday.  66 year old female from home, ambulates independently, has past medical hx of hypertension, COPD and surgical hx of partial thyroidectomy and total hysterectomy came to ED c/o uncontrolled hypertension associated to dizziness since Saturday. Patient refers she only takes Losartan when she feels sick or has a headache otherwise does not take it on a daily basis. 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 Patient denies chest pain, sob, palpitations, abdominal pain,  symptoms.

## 2021-10-13 LAB
ANION GAP SERPL CALC-SCNC: 10 MMOL/L — SIGNIFICANT CHANGE UP (ref 5–17)
BUN SERPL-MCNC: 17 MG/DL — SIGNIFICANT CHANGE UP (ref 7–18)
CALCIUM SERPL-MCNC: 9 MG/DL — SIGNIFICANT CHANGE UP (ref 8.4–10.5)
CHLORIDE SERPL-SCNC: 108 MMOL/L — SIGNIFICANT CHANGE UP (ref 96–108)
CO2 SERPL-SCNC: 24 MMOL/L — SIGNIFICANT CHANGE UP (ref 22–31)
COVID-19 SPIKE DOMAIN AB INTERP: POSITIVE
COVID-19 SPIKE DOMAIN ANTIBODY RESULT: >250 U/ML — HIGH
CREAT SERPL-MCNC: 1.15 MG/DL — SIGNIFICANT CHANGE UP (ref 0.5–1.3)
GLUCOSE SERPL-MCNC: 102 MG/DL — HIGH (ref 70–99)
HCT VFR BLD CALC: 37.6 % — SIGNIFICANT CHANGE UP (ref 34.5–45)
HGB BLD-MCNC: 11.7 G/DL — SIGNIFICANT CHANGE UP (ref 11.5–15.5)
MAGNESIUM SERPL-MCNC: 2.2 MG/DL — SIGNIFICANT CHANGE UP (ref 1.6–2.6)
MCHC RBC-ENTMCNC: 28 PG — SIGNIFICANT CHANGE UP (ref 27–34)
MCHC RBC-ENTMCNC: 31.1 GM/DL — LOW (ref 32–36)
MCV RBC AUTO: 90 FL — SIGNIFICANT CHANGE UP (ref 80–100)
NRBC # BLD: 0 /100 WBCS — SIGNIFICANT CHANGE UP (ref 0–0)
PHOSPHATE SERPL-MCNC: 3.4 MG/DL — SIGNIFICANT CHANGE UP (ref 2.5–4.5)
PLATELET # BLD AUTO: 364 K/UL — SIGNIFICANT CHANGE UP (ref 150–400)
POTASSIUM SERPL-MCNC: 5.1 MMOL/L — SIGNIFICANT CHANGE UP (ref 3.5–5.3)
POTASSIUM SERPL-SCNC: 5.1 MMOL/L — SIGNIFICANT CHANGE UP (ref 3.5–5.3)
RBC # BLD: 4.18 M/UL — SIGNIFICANT CHANGE UP (ref 3.8–5.2)
RBC # FLD: 14.4 % — SIGNIFICANT CHANGE UP (ref 10.3–14.5)
SARS-COV-2 IGG+IGM SERPL QL IA: >250 U/ML — HIGH
SARS-COV-2 IGG+IGM SERPL QL IA: POSITIVE
SODIUM SERPL-SCNC: 142 MMOL/L — SIGNIFICANT CHANGE UP (ref 135–145)
TROPONIN I SERPL-MCNC: <0.015 NG/ML — SIGNIFICANT CHANGE UP (ref 0–0.04)
WBC # BLD: 6.19 K/UL — SIGNIFICANT CHANGE UP (ref 3.8–10.5)
WBC # FLD AUTO: 6.19 K/UL — SIGNIFICANT CHANGE UP (ref 3.8–10.5)

## 2021-10-13 PROCEDURE — 99233 SBSQ HOSP IP/OBS HIGH 50: CPT | Mod: GC

## 2021-10-13 PROCEDURE — 93010 ELECTROCARDIOGRAM REPORT: CPT

## 2021-10-13 RX ORDER — HYDRALAZINE HCL 50 MG
2.5 TABLET ORAL ONCE
Refills: 0 | Status: COMPLETED | OUTPATIENT
Start: 2021-10-13 | End: 2021-10-13

## 2021-10-13 RX ORDER — LOSARTAN POTASSIUM 100 MG/1
10 TABLET, FILM COATED ORAL ONCE
Refills: 0 | Status: DISCONTINUED | OUTPATIENT
Start: 2021-10-13 | End: 2021-10-13

## 2021-10-13 RX ORDER — LANOLIN ALCOHOL/MO/W.PET/CERES
1 CREAM (GRAM) TOPICAL
Qty: 0 | Refills: 0 | DISCHARGE
Start: 2021-10-13

## 2021-10-13 RX ORDER — LOSARTAN POTASSIUM 100 MG/1
1 TABLET, FILM COATED ORAL
Qty: 30 | Refills: 0
Start: 2021-10-13 | End: 2021-11-11

## 2021-10-13 RX ORDER — LOSARTAN POTASSIUM 100 MG/1
25 TABLET, FILM COATED ORAL ONCE
Refills: 0 | Status: DISCONTINUED | OUTPATIENT
Start: 2021-10-13 | End: 2021-10-14

## 2021-10-13 RX ADMIN — Medication 30 MILLILITER(S): at 20:52

## 2021-10-13 RX ADMIN — ENOXAPARIN SODIUM 40 MILLIGRAM(S): 100 INJECTION SUBCUTANEOUS at 12:16

## 2021-10-13 RX ADMIN — BUDESONIDE AND FORMOTEROL FUMARATE DIHYDRATE 2 PUFF(S): 160; 4.5 AEROSOL RESPIRATORY (INHALATION) at 21:24

## 2021-10-13 RX ADMIN — BUDESONIDE AND FORMOTEROL FUMARATE DIHYDRATE 2 PUFF(S): 160; 4.5 AEROSOL RESPIRATORY (INHALATION) at 11:28

## 2021-10-13 RX ADMIN — Medication 3 MILLIGRAM(S): at 21:23

## 2021-10-13 RX ADMIN — LOSARTAN POTASSIUM 25 MILLIGRAM(S): 100 TABLET, FILM COATED ORAL at 06:00

## 2021-10-13 RX ADMIN — Medication 2.5 MILLIGRAM(S): at 20:52

## 2021-10-13 NOTE — PROGRESS NOTE ADULT - SUBJECTIVE AND OBJECTIVE BOX
PGY-1 Progress Note discussed with attending    PAGER #: [[phone]] TILL 5:00 PM  PLEASE CONTACT ON CALL TEAM:  - On Call Team (Please refer to Claudia) FROM 5:00 PM - 8:30PM  - Nightfloat Team FROM 8:30 -7:30 AM    CHIEF COMPLAINT & BRIEF HOSPITAL COURSE:    INTERVAL HPI/OVERNIGHT EVENTS:   Patient seen and examined at bedside. No acute events overnight. Denies symptoms this morning.    REVIEW OF SYSTEMS:  CONSTITUTIONAL: No fever, weight loss, or fatigue  RESPIRATORY: No cough, wheezing, chills or hemoptysis; No shortness of breath  CARDIOVASCULAR: No chest pain, palpitations, dizziness, or leg swelling  GASTROINTESTINAL: No abdominal pain. No nausea, vomiting, or hematemesis; No diarrhea or constipation. No melena or hematochezia.  GENITOURINARY: No dysuria or hematuria, urinary frequency  NEUROLOGICAL: No headaches, memory loss, loss of strength, numbness, or tremors  SKIN: No itching, burning, rashes, or lesions     ALBUTerol    90 MICROgram(s) HFA Inhaler 2 Puff(s) Inhalation every 6 hours PRN  budesonide  80 MICROgram(s)/formoterol 4.5 MICROgram(s) Inhaler 2 Puff(s) Inhalation two times a day  enoxaparin Injectable 40 milliGRAM(s) SubCutaneous daily  losartan 25 milliGRAM(s) Oral daily  melatonin 3 milliGRAM(s) Oral at bedtime      Vital Signs Last 24 Hrs  T(C): 36.6 (13 Oct 2021 11:08), Max: 37.2 (12 Oct 2021 16:07)  T(F): 97.8 (13 Oct 2021 11:08), Max: 98.9 (12 Oct 2021 16:07)  HR: 71 (13 Oct 2021 11:08) (62 - 71)  BP: 121/76 (13 Oct 2021 11:08) (104/66 - 147/84)  BP(mean): --  RR: 18 (13 Oct 2021 11:08) (18 - 189)  SpO2: 98% (13 Oct 2021 11:08) (95% - 98%)    PHYSICAL EXAMINATION:  GENERAL: NAD  HEAD:  Atraumatic, Normocephalic  EYES:  conjunctiva and sclera clear  NECK: Supple, No JVD, Normal thyroid  CHEST/LUNG: Bilateral rhonchi+crackles less than yesterday;  No rales, rhonchi, wheezing, or rubs  HEART: Regular rate and rhythm; No murmurs, rubs, or gallops  ABDOMEN: Soft, Nontender, Nondistended; Bowel sounds present  NERVOUS SYSTEM:  Alert & Oriented X3,   EXTREMITIES:  2+ Peripheral Pulses, No clubbing, cyanosis, or edema  SKIN: warm dry                          11.7   6.19  )-----------( 364      ( 13 Oct 2021 06:45 )             37.6     10-13    142  |  108  |  17  ----------------------------<  102<H>  5.1   |  24  |  1.15    Ca    9.0      13 Oct 2021 06:45  Phos  3.4     10-13  Mg     2.2     10-13    TPro  7.9  /  Alb  3.2<L>  /  TBili  0.3  /  DBili  x   /  AST  20  /  ALT  23  /  AlkPhos  74  10-11    LIVER FUNCTIONS - ( 11 Oct 2021 22:40 )  Alb: 3.2 g/dL / Pro: 7.9 g/dL / ALK PHOS: 74 U/L / ALT: 23 U/L DA / AST: 20 U/L / GGT: x           CARDIAC MARKERS ( 12 Oct 2021 11:15 )  <0.015 ng/mL / x     / x     / x     / x      CARDIAC MARKERS ( 11 Oct 2021 22:40 )  <0.015 ng/mL / x     / x     / x     / x              CAPILLARY BLOOD GLUCOSE      RADIOLOGY & ADDITIONAL TESTS:

## 2021-10-13 NOTE — DISCHARGE NOTE PROVIDER - NSDCMRMEDTOKEN_GEN_ALL_CORE_FT
albuterol 90 mcg/inh inhalation aerosol: 2 puff(s) inhaled every 6 hours, As needed, Shortness of Breath and/or Wheezing  losartan 25 mg oral tablet: 1 tab(s) orally once a day  Symbicort 80 mcg-4.5 mcg/inh inhalation aerosol: 2 puff(s) inhaled 2 times a day   albuterol 90 mcg/inh inhalation aerosol: 2 puff(s) inhaled every 6 hours, As needed, Shortness of Breath and/or Wheezing  losartan 25 mg oral tablet: 1 tab(s) orally once a day  Melatonin 3 mg oral tablet: 1 tab(s) orally once a day (at bedtime)  Symbicort 80 mcg-4.5 mcg/inh inhalation aerosol: 2 puff(s) inhaled 2 times a day   albuterol 90 mcg/inh inhalation aerosol: 2 puff(s) inhaled every 6 hours, As needed, Shortness of Breath and/or Wheezing  aluminum hydroxide-magnesium hydroxide 200 mg-200 mg/5 mL oral suspension: 30 milliliter(s) orally every 6 hours, As needed, Dyspepsia  atorvastatin 40 mg oral tablet: 1 tab(s) orally once a day (at bedtime)  losartan 25 mg oral tablet: 1 tab(s) orally 2 times a day  Melatonin 3 mg oral tablet: 1 tab(s) orally once a day (at bedtime)  Symbicort 80 mcg-4.5 mcg/inh inhalation aerosol: 2 puff(s) inhaled 2 times a day

## 2021-10-13 NOTE — PROGRESS NOTE ADULT - PROBLEM SELECTOR PLAN 3
- Patient with hx of COPD not on exacerbation at this time  - C/w home inhalers - Patient with hx of COPD not on exacerbation at this time  - Dr. Mcnally = no need for bronchodilators or steroids.

## 2021-10-13 NOTE — DISCHARGE NOTE PROVIDER - CARE PROVIDERS DIRECT ADDRESSES
,DirectAddress_Unknown,DirectAddress_Unknown ,DirectAddress_Unknown,DirectAddress_Unknown,[email or web address]

## 2021-10-13 NOTE — DISCHARGE NOTE PROVIDER - ATTENDING COMMENTS
Patient admitted for hypertensive urgency in setting of noncompliance of home antiHTN med, losartan. Patient restarted on losartan with good control of blood pressure. CTH showing stable R virchow Trey space vs old lacunar infarct but patient with no focal neurologic deficits. Patient stable for discharge with current medication of losartan. Discussed regarding importance of compliance. Patient to follow up with cardiology, pulmonology and neurology outpatient.

## 2021-10-13 NOTE — DISCHARGE NOTE NURSING/CASE MANAGEMENT/SOCIAL WORK - PATIENT PORTAL LINK FT
You can access the FollowMyHealth Patient Portal offered by [facility] by registering at the following website: [email or web address]. By joining [employer]’s FollowMyHealth portal, you will also be able to view your health information using other applications (apps) compatible with our system.

## 2021-10-13 NOTE — DISCHARGE NOTE PROVIDER - NSDCCAREPROVSEEN_GEN_ALL_CORE_FT
Sadi, Robb Sanchez, Rik Antunez, Kala Venegas, Bryan Amaya, No Name Given  Sadie, Wanda Dennison, Kika Taylor

## 2021-10-13 NOTE — DISCHARGE NOTE PROVIDER - CARE PROVIDER_API CALL
Raymundo Hutchins)  Cardiovascular Disease  [address], [address]  [place] [address]  Phone: [phone]  Fax: [phone]  Established Patient  Follow Up Time:     Marcel Mcnally)  Internal Medicine  [address], [address]  [place] [address]  Phone: [phone]  Fax: [phone]  Follow Up Time:    Raymundo Hutchins)  Cardiovascular Disease  [address], [address]  [place] [address]  Phone: [phone]  Fax: [phone]  Established Patient  Follow Up Time:     Marcel Mcnally)  Internal Medicine  [address], [address]  [place] [address]  Phone: [phone]  Fax: [phone]  Follow Up Time:     James Garay)  Clinical Neurophysiology; Neurology; Sleep Medicine  [address] [place] [address]  Phone: [phone]  Fax: [phone]  Follow Up Time:

## 2021-10-13 NOTE — DISCHARGE NOTE PROVIDER - HOSPITAL COURSE
66 year old female has past medical hx of hypertension, COPD and surgical hx of partial thyroidectomy and total hysterectomy came to ED c/o uncontrolled hypertension associated to dizziness since Saturday. Patient admitted for uncontrolled hypertension and dizziness.    In the Ed, patient was given Labetalol 20mg IVP. Admitted for hypertensive urgency and syncope workup at telemetry.    EKG NSR. Troponin negative x3. CT head negative. Telemetry was unremarkable. Patient's dizziness has resolved.   Home Losartan 25mg was continued. Patient's blood pressure was controlled the next day. Symptoms resolved.    Patient is stable for discharge. Patient has been advised to follow up as outpatient. Case has been discussed with the attending. 66 year old female has past medical hx of hypertension, COPD and surgical hx of partial thyroidectomy and total hysterectomy came to ED c/o uncontrolled hypertension associated to dizziness since Saturday. Patient admitted for uncontrolled hypertension and dizziness.    In the Ed, patient was given Labetalol 20mg IVP. Admitted for hypertensive urgency and syncope workup at telemetry.    EKG NSR. Troponin negative x3. CT head negative. Telemetry was unremarkable. Orthostatics negative. Patient's dizziness has resolved.   Home Losartan 25mg was continued. Patient's blood pressure was controlled the next day. Symptoms resolved.    Patient is stable for discharge. Patient has been advised to follow up as outpatient. Case has been discussed with the attending. 66 year old female has past medical hx of hypertension, COPD and surgical hx of partial thyroidectomy and total hysterectomy came to ED c/o uncontrolled hypertension associated to dizziness since Saturday. Patient admitted for uncontrolled hypertension and dizziness.    In the Ed, patient was given Labetalol 20mg IVP. Admitted for hypertensive urgency and syncope workup at telemetry.    EKG NSR. Troponin negative x3. CT head negative. Telemetry was unremarkable. Orthostatics negative. Patient's dizziness has resolved.   Home Losartan 25mg was continued. Patient's blood pressure was controlled the next day. Symptoms resolved.  Patient's blood pressure was elevated at night, so her Losartan dose was changed to 25mg bid.    Patient is stable for discharge. Patient has been advised to follow up as outpatient. Case has been discussed with the attending.

## 2021-10-13 NOTE — DISCHARGE NOTE PROVIDER - NSDCCPCAREPLAN_GEN_ALL_CORE_FT
PRINCIPAL DISCHARGE DIAGNOSIS  Diagnosis: Hypertensive urgency  Assessment and Plan of Treatment: You came in with blood pressure in the 190s and 200s for systolic BP. You were given IV medication at ED to lower your blood pressure and your home med Losartan 25mg was started. Your blood pressure has been under control since then.  /// Upon discharge, please take Losartan 25mg DAILY. Do not forget. Measure your blood pressure at home regularly as well. If it is high persistently, go to your primary care physician and seek for adjustment of meds.      SECONDARY DISCHARGE DIAGNOSES  Diagnosis: Dizziness  Assessment and Plan of Treatment: One of your symptoms was dizziness and it was associated with your hypertension. EKG showed normal sinus rhyth. Troponin was negative 3 times. CT of the head was negative for acute pathologies. Telemetry did not show arrhythmia. Your echocardiogram from February was within normal limits. Please control your hypertension.    Diagnosis: History of COPD  Assessment and Plan of Treatment: You have history of COPD and you take inhaler as needed. Continue to take inhaler as needec. Please follow up with Dr. Mcnally regularly.     PRINCIPAL DISCHARGE DIAGNOSIS  Diagnosis: Hypertensive urgency  Assessment and Plan of Treatment: You came in with blood pressure in the 190s and 200s for systolic BP. You were given IV medication at ED to lower your blood pressure and your home med Losartan 25mg was started. Your blood pressure has been under control since then.  /// Upon discharge, please take Losartan 25mg DAILY. Do not forget. Measure your blood pressure at home regularly as well. If it is high persistently, go to your primary care physician and seek for adjustment of meds.      SECONDARY DISCHARGE DIAGNOSES  Diagnosis: Dizziness  Assessment and Plan of Treatment: One of your symptoms was dizziness and it was associated with your hypertension. EKG showed normal sinus rhyth. Troponin was negative 3 times. CT of the head was negative for acute pathologies. Telemetry did not show arrhythmia. Your echocardiogram from February was within normal limits. Please control your hypertension. /// Also, do not take Ambien, as it may contribute to your dizziness. Buy Melatonin 3mg from any drugstore and use it daily at bedtime as needed    Diagnosis: History of COPD  Assessment and Plan of Treatment: You have history of COPD and you take inhaler as needed. Continue to take inhaler as needec. Please follow up with Dr. Mcnally regularly.     PRINCIPAL DISCHARGE DIAGNOSIS  Diagnosis: Hypertensive urgency  Assessment and Plan of Treatment: You came in with blood pressure in the 190s and 200s for systolic BP. You were given IV medication at ED to lower your blood pressure and your home med Losartan 25mg was started. Your blood pressure has been under control since then.  /// Upon discharge, please take Losartan 25mg DAILY. Do not forget. Measure your blood pressure at home regularly as well. If it is high persistently, go to your primary care physician and seek for adjustment of meds.      SECONDARY DISCHARGE DIAGNOSES  Diagnosis: Dizziness  Assessment and Plan of Treatment: One of your symptoms was dizziness and it was associated with your hypertension. EKG showed normal sinus rhyth. Troponin was negative 3 times. CT of the head was negative for acute pathologies. Telemetry did not show arrhythmia. Your echocardiogram from February was within normal limits. Please control your hypertension. /// Also, do not take Ambien, as it may contribute to your dizziness. Buy Melatonin 3mg from any drugstore and use it daily at bedtime as needed /// Follow up with Tanisha Mobley, the neurologist.    Diagnosis: History of COPD  Assessment and Plan of Treatment: You have history of COPD and you take inhaler as needed. Continue to take inhaler as needec. Please follow up with Dr. Mcnally regularly.     PRINCIPAL DISCHARGE DIAGNOSIS  Diagnosis: Hypertensive urgency  Assessment and Plan of Treatment: You came in with blood pressure in the 190s and 200s for systolic BP. You were given IV medication at ED to lower your blood pressure and your home med Losartan 25mg was started. Your blood pressure has been under control since then.  /// Upon discharge, please take Losartan 25mg TWICE A DAY. Do not forget. Measure your blood pressure at home regularly as well. If it is high persistently, go to your primary care physician and seek for adjustment of meds.      SECONDARY DISCHARGE DIAGNOSES  Diagnosis: Dizziness  Assessment and Plan of Treatment: One of your symptoms was dizziness and it was associated with your hypertension. EKG showed normal sinus rhyth. Troponin was negative 3 times. CT of the head was negative for acute pathologies. Telemetry did not show arrhythmia. Your echocardiogram from February was within normal limits. Please control your hypertension. /// Also, do not take Ambien, as it may contribute to your dizziness. Buy Melatonin 3mg from any drugstore and use it daily at bedtime as needed /// Follow up with Tanisha Mobley, the neurologist.    Diagnosis: History of COPD  Assessment and Plan of Treatment: You have history of COPD and you take inhaler as needed. Continue to take inhaler as needec. Please follow up with Dr. Mcnally regularly.     PRINCIPAL DISCHARGE DIAGNOSIS  Diagnosis: Hypertensive urgency  Assessment and Plan of Treatment: You came in with blood pressure in the 190s and 200s for systolic BP. You were given IV medication at ED to lower your blood pressure and your home med Losartan 25mg was started. Your blood pressure has been under control since then.  /// Upon discharge, please take Losartan 25mg TWICE A DAY. Do not forget. Measure your blood pressure at home regularly as well. If it is high persistently, go to your primary care physician and seek for adjustment of meds.      SECONDARY DISCHARGE DIAGNOSES  Diagnosis: Dizziness  Assessment and Plan of Treatment: One of your symptoms was dizziness and it was associated with your hypertension. EKG showed normal sinus rhyth. Troponin was negative 3 times. CT of the head was negative for acute pathologies. Telemetry did not show arrhythmia. Your echocardiogram from February was within normal limits. Please control your hypertension. /// Also, do not take Ambien, as it may contribute to your dizziness. Buy Melatonin 3mg from any drugstore and use it daily at bedtime as needed /// We are also giving you Atorvastatin 40mg to go home with. Please take it once a day at bedtime. /// Follow up with Dr. Garay, the neurologist.    Diagnosis: History of COPD  Assessment and Plan of Treatment: You have history of COPD and you take inhaler as needed. Continue to take inhaler as needec. Please follow up with Dr. Mcnally regularly.

## 2021-10-13 NOTE — DISCHARGE NOTE PROVIDER - ATTENDING SUPERVISION STATEMENT
Attending Supervision Statement: I have personally seen and examined the patient.  I fully participated in the care of this patient.  I have made amendments to the documentation where necessary, and agree with the history, physical exam, and plan as documented by the Resident

## 2021-10-13 NOTE — PROGRESS NOTE ADULT - ATTENDING COMMENTS
Admitted for hypertensive urgency in setting of noncompliance of home antiHTN med, losartan. Patient restarted on losartan with good control of blood pressure. CTH showing stable R virchow Trey space vs old lacunar infarct but patient with no focal neurologic deficits. Patient stable for discharge with current medication of losartan. Discussed regarding importance of compliance. Patient to follow up with cardiology, pulmonology and neurology outpatient. Patient feels anxious and does not want to be discharged today. 24hr notice given

## 2021-10-13 NOTE — PROGRESS NOTE ADULT - SUBJECTIVE AND OBJECTIVE BOX
Time of Visit:  Patient seen and examined.     MEDICATIONS  (STANDING):  budesonide  80 MICROgram(s)/formoterol 4.5 MICROgram(s) Inhaler 2 Puff(s) Inhalation two times a day  enoxaparin Injectable 40 milliGRAM(s) SubCutaneous daily  losartan 25 milliGRAM(s) Oral daily  losartan 25 milliGRAM(s) Oral once  melatonin 3 milliGRAM(s) Oral at bedtime      MEDICATIONS  (PRN):  ALBUTerol    90 MICROgram(s) HFA Inhaler 2 Puff(s) Inhalation every 6 hours PRN Shortness of Breath and/or Wheezing       Medications up to date at time of exam.      PHYSICAL EXAMINATION:  Patient has no new complaints.  GENERAL: The patient is a well-developed, well-nourished, in no apparent distress.     Vital Signs Last 24 Hrs  T(C): 36.8 (13 Oct 2021 15:52), Max: 36.9 (12 Oct 2021 23:35)  T(F): 98.2 (13 Oct 2021 15:52), Max: 98.4 (12 Oct 2021 23:35)  HR: 83 (13 Oct 2021 15:52) (62 - 83)  BP: 165/85 (13 Oct 2021 16:50) (108/56 - 165/85)  BP(mean): --  RR: 18 (13 Oct 2021 15:52) (18 - 18)  SpO2: 95% (13 Oct 2021 15:52) (95% - 98%)   (if applicable)    Chest Tube (if applicable)    HEENT: Head is normocephalic and atraumatic. Extraocular muscles are intact. Mucous membranes are moist.     NECK: Supple, no palpable adenopathy.    LUNGS: Clear to auscultation, no wheezing, rales, or rhonchi.    HEART: Regular rate and rhythm without murmur.    ABDOMEN: Soft, nontender, and nondistended.  No hepatosplenomegaly is noted.    : No painful voiding, no pelvic pain    EXTREMITIES: Without any cyanosis, clubbing, rash, lesions or edema.    NEUROLOGIC: Awake, alert, oriented, grossly intact    SKIN: Warm, dry, good turgor.      LABS:                        11.7   6.19  )-----------( 364      ( 13 Oct 2021 06:45 )             37.6     10-13    142  |  108  |  17  ----------------------------<  102<H>  5.1   |  24  |  1.15    Ca    9.0      13 Oct 2021 06:45  Phos  3.4     10  Mg     2.2     10-13    TPro  7.9  /  Alb  3.2<L>  /  TBili  0.3  /  DBili  x   /  AST  20  /  ALT  23  /  AlkPhos  74  10-      Urinalysis Basic - ( 12 Oct 2021 02:32 )    Color: Yellow / Appearance: Clear / S.005 / pH: x  Gluc: x / Ketone: Negative  / Bili: Negative / Urobili: Negative   Blood: x / Protein: Negative / Nitrite: Negative   Leuk Esterase: Trace / RBC: 0-2 /HPF / WBC 3-5 /HPF   Sq Epi: x / Non Sq Epi: Few /HPF / Bacteria: Few /HPF        CARDIAC MARKERS ( 12 Oct 2021 11:15 )  <0.015 ng/mL / x     / x     / x     / x      CARDIAC MARKERS ( 11 Oct 2021 22:40 )  <0.015 ng/mL / x     / x     / x     / x                    MICROBIOLOGY: (if applicable)    RADIOLOGY & ADDITIONAL STUDIES:  EKG:   CXR:  ECHO:    IMPRESSION: 66y Female PAST MEDICAL & SURGICAL HISTORY:  COPD (chronic obstructive pulmonary disease)    Sixth nerve palsy    HTN (hypertension)    S/P RANDOLPH-BSO    S/P thyroid surgery     p/w           IMP: 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  Pat stated that she is non compliant with her meds..skip doses .  COPD is stable . BP is better control . CXR with left base bronchiectasis . This is evening c/c of burping gas       Sugg  -BP fluctuated today but better control now   -pat express concern of being discharge and BP, i give assurance adn advice compliance     -continue meds  -no need for steroids or bronchodilators at this time  -maalox 30 ml now   mylicon 80 mg q8h  x 1 day    -continue care as per primary team

## 2021-10-13 NOTE — PROGRESS NOTE ADULT - PROBLEM SELECTOR PLAN 1
- On admission, patients /93 most likely 2/2 medication non compliance   - Treated with Labetalol 20 mg IVP in ED  - On Losartan 25 mg daily at home   - Target reduction in MAP no more than 25-30% in the first 1-4 hours then BP < =160/100    - Resumed home meds. Will adjust as needed  - Echo 2/2021 showed concentric LV remodeling. Hyperdynamic left ventricular systolic function (EF >70%). Mild diastolic dysfunction (stage I) and stress test was normal   - No need to repeat echo  - *If hypertensive overnight at 180~190SBP, can give hydralazine 2.5mg push  - Did not need any hydralazine.  - BP ~110/~60 overnight. 121/76 this morning.  - Can be discharged with Losartan 25mg - On admission, patients /93 most likely 2/2 medication non compliance   - Treated with Labetalol 20 mg IVP in ED  - On Losartan 25 mg daily at home   - Target reduction in MAP no more than 25-30% in the first 1-4 hours then BP < =160/100    - Resumed home meds. Will adjust as needed  - Echo 2/2021 showed concentric LV remodeling. Hyperdynamic left ventricular systolic function (EF >70%). Mild diastolic dysfunction (stage I) and stress test was normal   - No need to repeat echo  - *If hypertensive overnight at 180~190SBP, can give hydralazine 2.5mg push  - Did not need any hydralazine.  - BP ~110/~60 overnight. 121/76 this morning. 160s in afternoon.  - Will adjust discharge dose as needed

## 2021-10-13 NOTE — DISCHARGE NOTE PROVIDER - PROVIDER TOKENS
PROVIDER:[TOKEN:[[id number]],ESTABLISHEDPATIENT:[T]],PROVIDER:[TOKEN:[[id number]]] PROVIDER:[TOKEN:[[id number]],ESTABLISHEDPATIENT:[T]],PROVIDER:[TOKEN:[[id number]]],PROVIDER:[TOKEN:[[id number]]]

## 2021-10-13 NOTE — PROGRESS NOTE ADULT - PROBLEM SELECTOR PLAN 4
IMPROVE VTE Individual Risk Assessment  RISK                                                                Points  [  ] Previous VTE                                                  3  [  ] Thrombophilia                                               2  [  ] Lower limb paralysis                                      2        (unable to hold up >15 seconds)    [  ] Current Cancer                                              2         (within 6 months)  [  ] Immobilization > 24 hrs                                1  [  ] ICU/CCU stay > 24 hours                              1  [  ] Age > 60                                                      1  IMPROVE VTE Score ___2______    PPI for GI prophylaxis  Lovenox for DVT PPX IMPROVE VTE Individual Risk Assessment  RISK                                                                Points  [  ] Previous VTE                                                  3  [  ] Thrombophilia                                               2  [  ] Lower limb paralysis                                      2        (unable to hold up >15 seconds)    [  ] Current Cancer                                              2         (within 6 months)  [  ] Immobilization > 24 hrs                                1  [  ] ICU/CCU stay > 24 hours                              1  [  ] Age > 60                                                      1  IMPROVE VTE Score ___2______    Lovenox for DVT PPX

## 2021-10-13 NOTE — PROGRESS NOTE ADULT - PROBLEM SELECTOR PLAN 2
- Patient came c/o dizziness most likely 2/2 uncontrolled hypertension   - EKG NSR  - troponin x 3 negative   - CTH showed remote right basal lacunar infarct most likely 2/2 uncontrolled hypertension, no neuro deficits on exam   - Admitted to telemetry to r/o any arrhthymias -> d/c tele  - F/u Orthostatics   - Fall precautions - Patient came c/o dizziness most likely 2/2 uncontrolled hypertension   - EKG NSR  - troponin x 3 negative   - CTH negative  - Admitted to telemetry to r/o any arrhthymias -> d/c tele  - Fall precautions - Patient came c/o dizziness most likely 2/2 uncontrolled hypertension   - EKG NSR  - troponin x 3 negative   - CTH negative  - Admitted to telemetry to r/o any arrhthymias -> d/c tele  - Orthostatics negative  - Fall precautions

## 2021-10-14 VITALS
HEART RATE: 77 BPM | OXYGEN SATURATION: 99 % | DIASTOLIC BLOOD PRESSURE: 66 MMHG | RESPIRATION RATE: 20 BRPM | SYSTOLIC BLOOD PRESSURE: 115 MMHG

## 2021-10-14 LAB
ANION GAP SERPL CALC-SCNC: 10 MMOL/L — SIGNIFICANT CHANGE UP (ref 5–17)
BUN SERPL-MCNC: 12 MG/DL — SIGNIFICANT CHANGE UP (ref 7–18)
CALCIUM SERPL-MCNC: 9 MG/DL — SIGNIFICANT CHANGE UP (ref 8.4–10.5)
CHLORIDE SERPL-SCNC: 98 MMOL/L — SIGNIFICANT CHANGE UP (ref 96–108)
CO2 SERPL-SCNC: 25 MMOL/L — SIGNIFICANT CHANGE UP (ref 22–31)
CREAT SERPL-MCNC: 0.92 MG/DL — SIGNIFICANT CHANGE UP (ref 0.5–1.3)
GLUCOSE SERPL-MCNC: 112 MG/DL — HIGH (ref 70–99)
HCT VFR BLD CALC: 37 % — SIGNIFICANT CHANGE UP (ref 34.5–45)
HGB BLD-MCNC: 12.5 G/DL — SIGNIFICANT CHANGE UP (ref 11.5–15.5)
MAGNESIUM SERPL-MCNC: 2.2 MG/DL — SIGNIFICANT CHANGE UP (ref 1.6–2.6)
MCHC RBC-ENTMCNC: 28.6 PG — SIGNIFICANT CHANGE UP (ref 27–34)
MCHC RBC-ENTMCNC: 33.8 GM/DL — SIGNIFICANT CHANGE UP (ref 32–36)
MCV RBC AUTO: 84.7 FL — SIGNIFICANT CHANGE UP (ref 80–100)
NRBC # BLD: 0 /100 WBCS — SIGNIFICANT CHANGE UP (ref 0–0)
PHOSPHATE SERPL-MCNC: 3.5 MG/DL — SIGNIFICANT CHANGE UP (ref 2.5–4.5)
PLATELET # BLD AUTO: 392 K/UL — SIGNIFICANT CHANGE UP (ref 150–400)
POTASSIUM SERPL-MCNC: 4 MMOL/L — SIGNIFICANT CHANGE UP (ref 3.5–5.3)
POTASSIUM SERPL-SCNC: 4 MMOL/L — SIGNIFICANT CHANGE UP (ref 3.5–5.3)
RBC # BLD: 4.37 M/UL — SIGNIFICANT CHANGE UP (ref 3.8–5.2)
RBC # FLD: 14 % — SIGNIFICANT CHANGE UP (ref 10.3–14.5)
SODIUM SERPL-SCNC: 133 MMOL/L — LOW (ref 135–145)
WBC # BLD: 8.53 K/UL — SIGNIFICANT CHANGE UP (ref 3.8–10.5)
WBC # FLD AUTO: 8.53 K/UL — SIGNIFICANT CHANGE UP (ref 3.8–10.5)

## 2021-10-14 PROCEDURE — 80048 BASIC METABOLIC PNL TOTAL CA: CPT

## 2021-10-14 PROCEDURE — 81001 URINALYSIS AUTO W/SCOPE: CPT

## 2021-10-14 PROCEDURE — 87635 SARS-COV-2 COVID-19 AMP PRB: CPT

## 2021-10-14 PROCEDURE — 83735 ASSAY OF MAGNESIUM: CPT

## 2021-10-14 PROCEDURE — 94640 AIRWAY INHALATION TREATMENT: CPT

## 2021-10-14 PROCEDURE — 84484 ASSAY OF TROPONIN QUANT: CPT

## 2021-10-14 PROCEDURE — 84100 ASSAY OF PHOSPHORUS: CPT

## 2021-10-14 PROCEDURE — 99239 HOSP IP/OBS DSCHRG MGMT >30: CPT | Mod: GC

## 2021-10-14 PROCEDURE — 80053 COMPREHEN METABOLIC PANEL: CPT

## 2021-10-14 PROCEDURE — 96374 THER/PROPH/DIAG INJ IV PUSH: CPT

## 2021-10-14 PROCEDURE — 85025 COMPLETE CBC W/AUTO DIFF WBC: CPT

## 2021-10-14 PROCEDURE — 36415 COLL VENOUS BLD VENIPUNCTURE: CPT

## 2021-10-14 PROCEDURE — 93005 ELECTROCARDIOGRAM TRACING: CPT

## 2021-10-14 PROCEDURE — 85027 COMPLETE CBC AUTOMATED: CPT

## 2021-10-14 PROCEDURE — 99285 EMERGENCY DEPT VISIT HI MDM: CPT | Mod: 25

## 2021-10-14 PROCEDURE — 86769 SARS-COV-2 COVID-19 ANTIBODY: CPT

## 2021-10-14 RX ORDER — ATORVASTATIN CALCIUM 80 MG/1
40 TABLET, FILM COATED ORAL AT BEDTIME
Refills: 0 | Status: DISCONTINUED | OUTPATIENT
Start: 2021-10-15 | End: 2021-10-14

## 2021-10-14 RX ORDER — ASPIRIN/CALCIUM CARB/MAGNESIUM 324 MG
81 TABLET ORAL ONCE
Refills: 0 | Status: DISCONTINUED | OUTPATIENT
Start: 2021-10-14 | End: 2021-10-14

## 2021-10-14 RX ORDER — ATORVASTATIN CALCIUM 80 MG/1
40 TABLET, FILM COATED ORAL ONCE
Refills: 0 | Status: COMPLETED | OUTPATIENT
Start: 2021-10-14 | End: 2021-10-14

## 2021-10-14 RX ORDER — ATORVASTATIN CALCIUM 80 MG/1
1 TABLET, FILM COATED ORAL
Qty: 30 | Refills: 0
Start: 2021-10-14 | End: 2021-11-12

## 2021-10-14 RX ORDER — LOSARTAN POTASSIUM 100 MG/1
1 TABLET, FILM COATED ORAL
Qty: 60 | Refills: 0
Start: 2021-10-14 | End: 2021-11-12

## 2021-10-14 RX ORDER — LOSARTAN POTASSIUM 100 MG/1
25 TABLET, FILM COATED ORAL
Refills: 0 | Status: DISCONTINUED | OUTPATIENT
Start: 2021-10-14 | End: 2021-10-14

## 2021-10-14 RX ADMIN — ENOXAPARIN SODIUM 40 MILLIGRAM(S): 100 INJECTION SUBCUTANEOUS at 12:26

## 2021-10-14 RX ADMIN — Medication 30 MILLILITER(S): at 12:32

## 2021-10-14 RX ADMIN — BUDESONIDE AND FORMOTEROL FUMARATE DIHYDRATE 2 PUFF(S): 160; 4.5 AEROSOL RESPIRATORY (INHALATION) at 12:22

## 2021-10-14 RX ADMIN — ATORVASTATIN CALCIUM 40 MILLIGRAM(S): 80 TABLET, FILM COATED ORAL at 12:26

## 2021-10-14 RX ADMIN — ALBUTEROL 2 PUFF(S): 90 AEROSOL, METERED ORAL at 12:21

## 2021-10-14 RX ADMIN — LOSARTAN POTASSIUM 25 MILLIGRAM(S): 100 TABLET, FILM COATED ORAL at 12:27

## 2021-10-14 NOTE — PROGRESS NOTE ADULT - PROBLEM SELECTOR PLAN 2
- Patient came c/o dizziness most likely 2/2 uncontrolled hypertension   - EKG NSR  - troponin x 3 negative   - CTH negative  - Admitted to telemetry to r/o any arrhthymias -> d/c tele  - Orthostatics negative  - Fall precautions

## 2021-10-14 NOTE — PROGRESS NOTE ADULT - PROBLEM SELECTOR PLAN 3
- Patient with hx of COPD not on exacerbation at this time  - Dr. Mcnally = no need for bronchodilators or steroids.

## 2021-10-14 NOTE — PROGRESS NOTE ADULT - SUBJECTIVE AND OBJECTIVE BOX
Time of Visit:  Patient seen and examined.     MEDICATIONS  (STANDING):  budesonide  80 MICROgram(s)/formoterol 4.5 MICROgram(s) Inhaler 2 Puff(s) Inhalation two times a day  enoxaparin Injectable 40 milliGRAM(s) SubCutaneous daily  losartan 25 milliGRAM(s) Oral two times a day  melatonin 3 milliGRAM(s) Oral at bedtime      MEDICATIONS  (PRN):  ALBUTerol    90 MICROgram(s) HFA Inhaler 2 Puff(s) Inhalation every 6 hours PRN Shortness of Breath and/or Wheezing  aluminum hydroxide/magnesium hydroxide/simethicone Suspension 30 milliLiter(s) Oral every 6 hours PRN Dyspepsia       Medications up to date at time of exam.    ROS; No fever, chills, cough, congestion on exam.   PHYSICAL EXAMINATION:  Vital Signs Last 24 Hrs  T(C): 36.8 (14 Oct 2021 10:25), Max: 36.9 (13 Oct 2021 23:51)  T(F): 98.2 (14 Oct 2021 10:25), Max: 98.4 (13 Oct 2021 23:51)  HR: 77 (14 Oct 2021 13:47) (74 - 88)  BP: 115/66 (14 Oct 2021 13:47) (110/68 - 192/97)  BP(mean): --  RR: 20 (14 Oct 2021 13:47) (18 - 20)  SpO2: 99% (14 Oct 2021 13:47) (95% - 100%)   (if applicable)    General: Alert and oriented. Able to answer question with no SOB. No acute distress.      HEENT: Head is normocephalic and atraumatic. Extraocular muscles are intact. Mucous membranes are moist.     NECK: Supple, no palpable adenopathy.    LUNGS: Clear to auscultation bilaterally with no wheezing, rales, or rhonchi. No use of accessory muscle.     HEART: S1 S2 Regular rate and no click/ rub.     ABDOMEN: Soft, nontender, and nondistended.  No hepatosplenomegaly is noted. Active bowel sounds.     EXTREMITIES: Without any cyanosis, clubbing, rash, lesions or edema.    NEUROLOGIC: Awake, alert, oriented.     SKIN: Warm and moist . Non diaphoretic.       LABS:                        12.5   8.53  )-----------( 392      ( 14 Oct 2021 09:01 )             37.0     10-14    133<L>  |  98  |  12  ----------------------------<  112<H>  4.0   |  25  |  0.92    Ca    9.0      14 Oct 2021 09:01  Phos  3.5     10-14  Mg     2.2     10-14            CARDIAC MARKERS ( 13 Oct 2021 20:52 )  <0.015 ng/mL / x     / x     / x     / x                    MICROBIOLOGY: (if applicable)    RADIOLOGY & ADDITIONAL STUDIES:  EKG:   CXR:  ECHO:    IMPRESSION: 66y Female PAST MEDICAL & SURGICAL HISTORY:  COPD (chronic obstructive pulmonary disease)    Sixth nerve palsy    HTN (hypertension)    S/P RANDOLPH-BSO    S/P thyroid surgery    Impression; 65 Y/O Female presented to ED with uncontrolled Hypertension associated with Dizziness since Saturday. Patient was non compliant to medication. Has COPD and no exacerbation on exam. CXR with left base bronchiectasis. 10-11-21 Negative for Covid 19 PCR.       Suggestion:   O2 saturation 98% room air. So far saturating good room air.   Reinforced the importance of Daily compliance to Daily medications.   No need for steroids or bronchodilators at this time  Pulmonary follow up with [facility].

## 2021-10-14 NOTE — PROGRESS NOTE ADULT - SUBJECTIVE AND OBJECTIVE BOX
PGY-1 Progress Note discussed with attending    PAGER #: [[phone]] TILL 5:00 PM  PLEASE CONTACT ON CALL TEAM:  - On Call Team (Please refer to Claudia) FROM 5:00 PM - 8:30PM  - Nightfloat Team FROM 8:30 -7:30 AM    CHIEF COMPLAINT & BRIEF HOSPITAL COURSE:    INTERVAL HPI/OVERNIGHT EVENTS:   Patient seen and examined at bedside. No acute events overnight.    REVIEW OF SYSTEMS:  CONSTITUTIONAL: No fever, weight loss, or fatigue  RESPIRATORY: No cough, wheezing, chills or hemoptysis; No shortness of breath  CARDIOVASCULAR: No chest pain, palpitations, dizziness, or leg swelling  GASTROINTESTINAL: No abdominal pain. No nausea, vomiting, or hematemesis; No diarrhea or constipation. No melena or hematochezia.  GENITOURINARY: No dysuria or hematuria, urinary frequency  NEUROLOGICAL: No headaches, memory loss, loss of strength, numbness, or tremors  SKIN: No itching, burning, rashes, or lesions     ALBUTerol    90 MICROgram(s) HFA Inhaler 2 Puff(s) Inhalation every 6 hours PRN  aluminum hydroxide/magnesium hydroxide/simethicone Suspension 30 milliLiter(s) Oral every 6 hours PRN  budesonide  80 MICROgram(s)/formoterol 4.5 MICROgram(s) Inhaler 2 Puff(s) Inhalation two times a day  enoxaparin Injectable 40 milliGRAM(s) SubCutaneous daily  losartan 25 milliGRAM(s) Oral daily  losartan 25 milliGRAM(s) Oral once  melatonin 3 milliGRAM(s) Oral at bedtime      Vital Signs Last 24 Hrs  T(C): 36.7 (14 Oct 2021 07:14), Max: 36.9 (13 Oct 2021 23:51)  T(F): 98 (14 Oct 2021 07:14), Max: 98.4 (13 Oct 2021 23:51)  HR: 84 (14 Oct 2021 07:14) (68 - 88)  BP: 117/67 (14 Oct 2021 07:14) (110/68 - 192/97)  BP(mean): --  RR: 18 (14 Oct 2021 07:14) (18 - 18)  SpO2: 98% (14 Oct 2021 07:14) (95% - 98%)    PHYSICAL EXAMINATION:  GENERAL: NAD  HEAD:  Atraumatic, Normocephalic  EYES:  conjunctiva and sclera clear  NECK: Supple, No JVD, Normal thyroid  CHEST/LUNG: Bilateral rhonchi+crackles less than yesterday;  No rales, rhonchi, wheezing, or rubs  HEART: Regular rate and rhythm; No murmurs, rubs, or gallops  ABDOMEN: Soft, Nontender, Nondistended; Bowel sounds present  NERVOUS SYSTEM:  Alert & Oriented X3,   EXTREMITIES:  2+ Peripheral Pulses, No clubbing, cyanosis, or edema  SKIN: warm dry                          11.7   6.19  )-----------( 364      ( 13 Oct 2021 06:45 )             37.6     10-13    142  |  108  |  17  ----------------------------<  102<H>  5.1   |  24  |  1.15    Ca    9.0      13 Oct 2021 06:45  Phos  3.4     10-13  Mg     2.2     10-13        CARDIAC MARKERS ( 13 Oct 2021 20:52 )  <0.015 ng/mL / x     / x     / x     / x      CARDIAC MARKERS ( 12 Oct 2021 11:15 )  <0.015 ng/mL / x     / x     / x     / x              CAPILLARY BLOOD GLUCOSE      RADIOLOGY & ADDITIONAL TESTS:           PGY-1 Progress Note discussed with attending    PAGER #: [[phone]] TILL 5:00 PM  PLEASE CONTACT ON CALL TEAM:  - On Call Team (Please refer to Claudia) FROM 5:00 PM - 8:30PM  - Nightfloat Team FROM 8:30 -7:30 AM    CHIEF COMPLAINT & BRIEF HOSPITAL COURSE:    INTERVAL HPI/OVERNIGHT EVENTS:   Patient seen and examined at bedside. BP upto 190s overnight. Given hydralazine 2.5 IVP. Will start Losartan 25 bid. Complains of left sided tingling.    REVIEW OF SYSTEMS:  CONSTITUTIONAL: No fever, weight loss, or fatigue  RESPIRATORY: No cough, wheezing, chills or hemoptysis; No shortness of breath  CARDIOVASCULAR: No chest pain, palpitations, dizziness, or leg swelling  GASTROINTESTINAL: No abdominal pain. No nausea, vomiting, or hematemesis; No diarrhea or constipation. No melena or hematochezia.  GENITOURINARY: No dysuria or hematuria, urinary frequency  NEUROLOGICAL: No headaches, memory loss, loss of strength, or tremors  SKIN: No itching, burning, rashes, or lesions     ALBUTerol    90 MICROgram(s) HFA Inhaler 2 Puff(s) Inhalation every 6 hours PRN  aluminum hydroxide/magnesium hydroxide/simethicone Suspension 30 milliLiter(s) Oral every 6 hours PRN  budesonide  80 MICROgram(s)/formoterol 4.5 MICROgram(s) Inhaler 2 Puff(s) Inhalation two times a day  enoxaparin Injectable 40 milliGRAM(s) SubCutaneous daily  losartan 25 milliGRAM(s) Oral daily  losartan 25 milliGRAM(s) Oral once  melatonin 3 milliGRAM(s) Oral at bedtime      Vital Signs Last 24 Hrs  T(C): 36.7 (14 Oct 2021 07:14), Max: 36.9 (13 Oct 2021 23:51)  T(F): 98 (14 Oct 2021 07:14), Max: 98.4 (13 Oct 2021 23:51)  HR: 84 (14 Oct 2021 07:14) (68 - 88)  BP: 117/67 (14 Oct 2021 07:14) (110/68 - 192/97)  BP(mean): --  RR: 18 (14 Oct 2021 07:14) (18 - 18)  SpO2: 98% (14 Oct 2021 07:14) (95% - 98%)    PHYSICAL EXAMINATION:  GENERAL: NAD  HEAD:  Atraumatic, Normocephalic  EYES:  conjunctiva and sclera clear  NECK: Supple, No JVD, Normal thyroid  CHEST/LUNG: Bilateral rhonchi+crackles less than yesterday;  No rales, rhonchi, wheezing, or rubs  HEART: Regular rate and rhythm; No murmurs, rubs, or gallops  ABDOMEN: Soft, Nontender, Nondistended; Bowel sounds present  NERVOUS SYSTEM:  Alert & Oriented X3, Strength 5/5 ULEs and LLEs, no focal deficits  EXTREMITIES:  2+ Peripheral Pulses, No clubbing, cyanosis, or edema  SKIN: warm dry                          11.7   6.19  )-----------( 364      ( 13 Oct 2021 06:45 )             37.6     10-13    142  |  108  |  17  ----------------------------<  102<H>  5.1   |  24  |  1.15    Ca    9.0      13 Oct 2021 06:45  Phos  3.4     10-13  Mg     2.2     10-13        CARDIAC MARKERS ( 13 Oct 2021 20:52 )  <0.015 ng/mL / x     / x     / x     / x      CARDIAC MARKERS ( 12 Oct 2021 11:15 )  <0.015 ng/mL / x     / x     / x     / x              CAPILLARY BLOOD GLUCOSE      RADIOLOGY & ADDITIONAL TESTS:

## 2021-10-14 NOTE — PROGRESS NOTE ADULT - PROBLEM SELECTOR PROBLEM 2
Postural dizziness with presyncope

## 2021-10-14 NOTE — PROGRESS NOTE ADULT - REASON FOR ADMISSION
UNCONTROLLED HYPERTENSION

## 2021-10-14 NOTE — PROGRESS NOTE ADULT - PROBLEM SELECTOR PLAN 4
IMPROVE VTE Individual Risk Assessment  RISK                                                                Points  [  ] Previous VTE                                                  3  [  ] Thrombophilia                                               2  [  ] Lower limb paralysis                                      2        (unable to hold up >15 seconds)    [  ] Current Cancer                                              2         (within 6 months)  [  ] Immobilization > 24 hrs                                1  [  ] ICU/CCU stay > 24 hours                              1  [  ] Age > 60                                                      1  IMPROVE VTE Score ___2______    Lovenox for DVT PPX

## 2021-10-14 NOTE — PROGRESS NOTE ADULT - PROBLEM SELECTOR PLAN 1
- On admission, patients /93 most likely 2/2 medication non compliance   - Treated with Labetalol 20 mg IVP in ED  - On Losartan 25 mg daily at home   - Target reduction in MAP no more than 25-30% in the first 1-4 hours then BP < =160/100    - Resumed home meds. Will adjust as needed  - Echo 2/2021 showed concentric LV remodeling. Hyperdynamic left ventricular systolic function (EF >70%). Mild diastolic dysfunction (stage I) and stress test was normal   - No need to repeat echo  - *If hypertensive overnight at 180~190SBP, can give hydralazine 2.5mg push  - Did not need any hydralazine.  - BP ~110/~60 overnight. 121/76 this morning. 160s in afternoon.  - Will adjust discharge dose as needed - On admission, patients /93 most likely 2/2 medication non compliance   - Treated with Labetalol 20 mg IVP in ED  - On Losartan 25 mg daily at home   - Target reduction in MAP no more than 25-30% in the first 1-4 hours then BP < =160/100    - Resumed home meds. Will adjust as needed  - Echo 2/2021 showed concentric LV remodeling. Hyperdynamic left ventricular systolic function (EF >70%). Mild diastolic dysfunction (stage I) and stress test was normal   - No need to repeat echo  - *If hypertensive overnight at 180~190SBP, can give hydralazine 2.5mg push  - Dischaging on Losartan 25mg bid

## 2022-02-09 NOTE — ED ADULT NURSE NOTE - CAS EDN DISCHARGE ASSESSMENT
Alert and oriented to person, place and time Opioid Pregnancy And Lactation Text: These medications can lead to premature delivery and should be avoided during pregnancy. These medications are also present in breast milk in small amounts.

## 2022-03-21 NOTE — ED ADULT NURSE NOTE - PAIN: PRESENCE, MLM
Has Your Skin Lesion Been Treated?: not been treated complains of pain/discomfort Is This A New Presentation, Or A Follow-Up?: Skin Lesions

## 2022-09-06 NOTE — DIETITIAN INITIAL EVALUATION ADULT. - ORAL INTAKE PTA
Spoke with patient and she verbally understands, going to monitor BP/HR and call back with anymore concerns.    poor x 4d PTA/poor

## 2022-10-08 NOTE — PROGRESS NOTE ADULT - PROBLEM/PLAN-4
DISPLAY PLAN FREE TEXT increased time in double stance/decreased step length/decreased weight-shifting ability

## 2023-03-20 NOTE — PATIENT PROFILE ADULT. - CONTRAINDICATIONS & PRECAUTIONS (SELECT ALL THAT APPLY)
Case Management Discharge Note      Final Note: [facility].    Selected Continued Care - Discharged on 3/19/2023 Admission date: 3/16/2023 - Discharge disposition: Home-Health Care c       Home Medical Care Coordination complete.    Service Provider Selected Services Address Phone Fax Patient Preferred    [facility] - [facility] [address] [place] [phone] --           Transportation Services  Private: Car    Final Discharge Disposition Code: 06 - home with home health care   Moderate to severe acute illness with or without fever. Delay administration of vaccination until patient has been afebrile or illness resolved for 24hrs

## 2023-05-02 NOTE — PATIENT PROFILE ADULT - NSPROGENOTHERPROVIDER_GEN_A_NUR
Patient and patient's wife was informed of the results. Patient's wife states patient has a drool coming out of mouth; sometimes on one side and sometimes on both sides of mouth. This has been happening x 1 month.     Please advise. Thanks    none

## 2023-08-29 NOTE — ED ADULT TRIAGE NOTE - INTERNATIONAL TRAVEL
Previous Labs: No How Did The Hair Loss Occur?: gradual in onset How Severe Is Your Hair Loss?: mild No

## 2024-03-18 ENCOUNTER — INPATIENT (INPATIENT)
Facility: HOSPITAL | Age: 69
LOS: 1 days | Discharge: HOME | DRG: 313 | End: 2024-03-20
Attending: STUDENT IN AN ORGANIZED HEALTH CARE EDUCATION/TRAINING PROGRAM | Admitting: STUDENT IN AN ORGANIZED HEALTH CARE EDUCATION/TRAINING PROGRAM
Payer: MEDICARE

## 2024-03-18 VITALS
HEIGHT: 59 IN | WEIGHT: 104.94 LBS | HEART RATE: 73 BPM | TEMPERATURE: 98 F | DIASTOLIC BLOOD PRESSURE: 85 MMHG | RESPIRATION RATE: 16 BRPM | SYSTOLIC BLOOD PRESSURE: 164 MMHG | OXYGEN SATURATION: 96 %

## 2024-03-18 DIAGNOSIS — R73.03 PREDIABETES: ICD-10-CM

## 2024-03-18 DIAGNOSIS — I24.9 ACUTE ISCHEMIC HEART DISEASE, UNSPECIFIED: ICD-10-CM

## 2024-03-18 DIAGNOSIS — Z98.890 OTHER SPECIFIED POSTPROCEDURAL STATES: Chronic | ICD-10-CM

## 2024-03-18 DIAGNOSIS — Z90.710 ACQUIRED ABSENCE OF BOTH CERVIX AND UTERUS: Chronic | ICD-10-CM

## 2024-03-18 DIAGNOSIS — E78.5 HYPERLIPIDEMIA, UNSPECIFIED: ICD-10-CM

## 2024-03-18 DIAGNOSIS — Z29.9 ENCOUNTER FOR PROPHYLACTIC MEASURES, UNSPECIFIED: ICD-10-CM

## 2024-03-18 DIAGNOSIS — I16.0 HYPERTENSIVE URGENCY: ICD-10-CM

## 2024-03-18 DIAGNOSIS — H40.9 UNSPECIFIED GLAUCOMA: ICD-10-CM

## 2024-03-18 DIAGNOSIS — J45.909 UNSPECIFIED ASTHMA, UNCOMPLICATED: ICD-10-CM

## 2024-03-18 DIAGNOSIS — J44.9 CHRONIC OBSTRUCTIVE PULMONARY DISEASE, UNSPECIFIED: ICD-10-CM

## 2024-03-18 DIAGNOSIS — R07.9 CHEST PAIN, UNSPECIFIED: ICD-10-CM

## 2024-03-18 LAB
ALBUMIN SERPL ELPH-MCNC: 3.2 G/DL — LOW (ref 3.5–5)
ALP SERPL-CCNC: 64 U/L — SIGNIFICANT CHANGE UP (ref 40–120)
ALT FLD-CCNC: 54 U/L DA — SIGNIFICANT CHANGE UP (ref 10–60)
ANION GAP SERPL CALC-SCNC: 5 MMOL/L — SIGNIFICANT CHANGE UP (ref 5–17)
APPEARANCE UR: CLEAR — SIGNIFICANT CHANGE UP
APTT BLD: 32.7 SEC — SIGNIFICANT CHANGE UP (ref 24.5–35.6)
AST SERPL-CCNC: 48 U/L — HIGH (ref 10–40)
BASOPHILS # BLD AUTO: 0.03 K/UL — SIGNIFICANT CHANGE UP (ref 0–0.2)
BASOPHILS NFR BLD AUTO: 0.3 % — SIGNIFICANT CHANGE UP (ref 0–2)
BILIRUB SERPL-MCNC: 0.3 MG/DL — SIGNIFICANT CHANGE UP (ref 0.2–1.2)
BILIRUB UR-MCNC: NEGATIVE — SIGNIFICANT CHANGE UP
BUN SERPL-MCNC: 24 MG/DL — HIGH (ref 7–18)
CALCIUM SERPL-MCNC: 8.9 MG/DL — SIGNIFICANT CHANGE UP (ref 8.4–10.5)
CHLORIDE SERPL-SCNC: 106 MMOL/L — SIGNIFICANT CHANGE UP (ref 96–108)
CO2 SERPL-SCNC: 23 MMOL/L — SIGNIFICANT CHANGE UP (ref 22–31)
COLOR SPEC: YELLOW — SIGNIFICANT CHANGE UP
CREAT SERPL-MCNC: 1.28 MG/DL — SIGNIFICANT CHANGE UP (ref 0.5–1.3)
DIFF PNL FLD: NEGATIVE — SIGNIFICANT CHANGE UP
EGFR: 46 ML/MIN/1.73M2 — LOW
EOSINOPHIL # BLD AUTO: 0.11 K/UL — SIGNIFICANT CHANGE UP (ref 0–0.5)
EOSINOPHIL NFR BLD AUTO: 1 % — SIGNIFICANT CHANGE UP (ref 0–6)
FLUAV AG NPH QL: SIGNIFICANT CHANGE UP
FLUBV AG NPH QL: SIGNIFICANT CHANGE UP
GLUCOSE SERPL-MCNC: 126 MG/DL — HIGH (ref 70–99)
GLUCOSE UR QL: 500 MG/DL
HCT VFR BLD CALC: 34.2 % — LOW (ref 34.5–45)
HGB BLD-MCNC: 11.1 G/DL — LOW (ref 11.5–15.5)
IMM GRANULOCYTES NFR BLD AUTO: 0.3 % — SIGNIFICANT CHANGE UP (ref 0–0.9)
INR BLD: 0.89 RATIO — SIGNIFICANT CHANGE UP (ref 0.85–1.18)
KETONES UR-MCNC: NEGATIVE MG/DL — SIGNIFICANT CHANGE UP
LEUKOCYTE ESTERASE UR-ACNC: ABNORMAL
LYMPHOCYTES # BLD AUTO: 1.72 K/UL — SIGNIFICANT CHANGE UP (ref 1–3.3)
LYMPHOCYTES # BLD AUTO: 15.2 % — SIGNIFICANT CHANGE UP (ref 13–44)
MCHC RBC-ENTMCNC: 28 PG — SIGNIFICANT CHANGE UP (ref 27–34)
MCHC RBC-ENTMCNC: 32.5 GM/DL — SIGNIFICANT CHANGE UP (ref 32–36)
MCV RBC AUTO: 86.4 FL — SIGNIFICANT CHANGE UP (ref 80–100)
MONOCYTES # BLD AUTO: 0.36 K/UL — SIGNIFICANT CHANGE UP (ref 0–0.9)
MONOCYTES NFR BLD AUTO: 3.2 % — SIGNIFICANT CHANGE UP (ref 2–14)
NEUTROPHILS # BLD AUTO: 9.03 K/UL — HIGH (ref 1.8–7.4)
NEUTROPHILS NFR BLD AUTO: 80 % — HIGH (ref 43–77)
NITRITE UR-MCNC: NEGATIVE — SIGNIFICANT CHANGE UP
NRBC # BLD: 0 /100 WBCS — SIGNIFICANT CHANGE UP (ref 0–0)
PH UR: 5.5 — SIGNIFICANT CHANGE UP (ref 5–8)
PLATELET # BLD AUTO: 274 K/UL — SIGNIFICANT CHANGE UP (ref 150–400)
POTASSIUM SERPL-MCNC: 4.4 MMOL/L — SIGNIFICANT CHANGE UP (ref 3.5–5.3)
POTASSIUM SERPL-SCNC: 4.4 MMOL/L — SIGNIFICANT CHANGE UP (ref 3.5–5.3)
PROT SERPL-MCNC: 7.4 G/DL — SIGNIFICANT CHANGE UP (ref 6–8.3)
PROT UR-MCNC: NEGATIVE MG/DL — SIGNIFICANT CHANGE UP
PROTHROM AB SERPL-ACNC: 10.2 SEC — SIGNIFICANT CHANGE UP (ref 9.5–13)
RBC # BLD: 3.96 M/UL — SIGNIFICANT CHANGE UP (ref 3.8–5.2)
RBC # FLD: 15.9 % — HIGH (ref 10.3–14.5)
SARS-COV-2 RNA SPEC QL NAA+PROBE: SIGNIFICANT CHANGE UP
SODIUM SERPL-SCNC: 134 MMOL/L — LOW (ref 135–145)
SP GR SPEC: 1.01 — SIGNIFICANT CHANGE UP (ref 1–1.03)
TROPONIN I, HIGH SENSITIVITY RESULT: 47.5 NG/L — SIGNIFICANT CHANGE UP
UROBILINOGEN FLD QL: 0.2 MG/DL — SIGNIFICANT CHANGE UP (ref 0.2–1)
WBC # BLD: 11.28 K/UL — HIGH (ref 3.8–10.5)
WBC # FLD AUTO: 11.28 K/UL — HIGH (ref 3.8–10.5)

## 2024-03-18 PROCEDURE — 99222 1ST HOSP IP/OBS MODERATE 55: CPT | Mod: GC

## 2024-03-18 PROCEDURE — 99285 EMERGENCY DEPT VISIT HI MDM: CPT

## 2024-03-18 PROCEDURE — 71046 X-RAY EXAM CHEST 2 VIEWS: CPT | Mod: 26

## 2024-03-18 RX ORDER — HYDRALAZINE HCL 50 MG
5 TABLET ORAL ONCE
Refills: 0 | Status: COMPLETED | OUTPATIENT
Start: 2024-03-18 | End: 2024-03-18

## 2024-03-18 RX ORDER — BUDESONIDE AND FORMOTEROL FUMARATE DIHYDRATE 160; 4.5 UG/1; UG/1
2 AEROSOL RESPIRATORY (INHALATION)
Refills: 0 | Status: DISCONTINUED | OUTPATIENT
Start: 2024-03-18 | End: 2024-03-20

## 2024-03-18 RX ORDER — LANOLIN ALCOHOL/MO/W.PET/CERES
5 CREAM (GRAM) TOPICAL AT BEDTIME
Refills: 0 | Status: DISCONTINUED | OUTPATIENT
Start: 2024-03-18 | End: 2024-03-20

## 2024-03-18 RX ORDER — ALBUTEROL 90 UG/1
2 AEROSOL, METERED ORAL EVERY 6 HOURS
Refills: 0 | Status: DISCONTINUED | OUTPATIENT
Start: 2024-03-18 | End: 2024-03-20

## 2024-03-18 RX ORDER — FAMOTIDINE 10 MG/ML
20 INJECTION INTRAVENOUS ONCE
Refills: 0 | Status: COMPLETED | OUTPATIENT
Start: 2024-03-18 | End: 2024-03-18

## 2024-03-18 RX ORDER — DIPHENHYDRAMINE HCL 50 MG
50 CAPSULE ORAL ONCE
Refills: 0 | Status: COMPLETED | OUTPATIENT
Start: 2024-03-18 | End: 2024-03-18

## 2024-03-18 RX ORDER — LOSARTAN POTASSIUM 100 MG/1
25 TABLET, FILM COATED ORAL DAILY
Refills: 0 | Status: DISCONTINUED | OUTPATIENT
Start: 2024-03-18 | End: 2024-03-18

## 2024-03-18 RX ORDER — LOSARTAN POTASSIUM 100 MG/1
25 TABLET, FILM COATED ORAL
Refills: 0 | Status: DISCONTINUED | OUTPATIENT
Start: 2024-03-18 | End: 2024-03-19

## 2024-03-18 RX ORDER — DEXAMETHASONE 0.5 MG/5ML
10 ELIXIR ORAL ONCE
Refills: 0 | Status: COMPLETED | OUTPATIENT
Start: 2024-03-18 | End: 2024-03-18

## 2024-03-18 RX ORDER — TIMOLOL 0.5 %
1 DROPS OPHTHALMIC (EYE)
Refills: 0 | Status: DISCONTINUED | OUTPATIENT
Start: 2024-03-18 | End: 2024-03-20

## 2024-03-18 RX ORDER — ATORVASTATIN CALCIUM 80 MG/1
40 TABLET, FILM COATED ORAL AT BEDTIME
Refills: 0 | Status: DISCONTINUED | OUTPATIENT
Start: 2024-03-18 | End: 2024-03-19

## 2024-03-18 RX ADMIN — Medication 10 MILLIGRAM(S): at 22:18

## 2024-03-18 RX ADMIN — FAMOTIDINE 20 MILLIGRAM(S): 10 INJECTION INTRAVENOUS at 22:19

## 2024-03-18 RX ADMIN — Medication 50 MILLIGRAM(S): at 22:19

## 2024-03-18 NOTE — H&P ADULT - PROBLEM SELECTOR PLAN 2
195/93 in ER   takes losartan 25 BID at home though has not taken it consistently recently bc BP has been normal   s/p hydralazine 5mg IV in ER   c/w losartan 195/93 in ER   takes losartan 25 BID at home though has not taken it consistently recently bc BP has been normal   c/w losartan

## 2024-03-18 NOTE — ED PROVIDER NOTE - NS_BEDUNITTYPES_ED_ALL_ED
[de-identified] : Ultrasound of the bilateral hips 9/9/24 was independently reviewed today. Mildly immature appearing left hip likely related to the patient's young age. The right alpha angle measures 64 degrees and the right capital femoral epiphysis is approximately 53% covered by the bony acetabulum. The left alpha angle measures 67 degrees and the left capital femoral epiphysis is approximately 48% covered by the bony acetabulum.
[de-identified] : Ultrasound of the bilateral hips 9/9/24 was independently reviewed today. Mildly immature appearing left hip likely related to the patient's young age. The right alpha angle measures 64 degrees and the right capital femoral epiphysis is approximately 53% covered by the bony acetabulum. The left alpha angle measures 67 degrees and the left capital femoral epiphysis is approximately 48% covered by the bony acetabulum.
TELEMETRY

## 2024-03-18 NOTE — ED PROVIDER NOTE - PHYSICAL EXAMINATION
Afebrile, hemodynamically stable, saturating well on room air  NAD, well appearing, sitting comfortably in bed, no WOB/tachypnea, speaking full sentences  Head NCAT  EOMI grossly, anicteric  MMM  No JVD  RRR, nml S1/S2, no m/r/g  Lungs CTAB, no w/r/r  Abd soft, NT, ND, nml BS, no rebound or guarding  AAO, CN's 3-12 intact, motor 5/5 and sens symm in all extrems  BALLESTEROS spontaneously, no leg cyanosis or edema or calf tenderness, 2+ symmetric radial pulses  Skin warm, dry, no rashes or hives, mild upper and lower lip and periorbital edema

## 2024-03-18 NOTE — H&P ADULT - HISTORY OF PRESENT ILLNESS
69 year old woman with hypertension, hyperlipidemia, asthma, COPD, prediabetes, and glaucoma who is coming for one day of chest pain and bilateral jaw numbness and tingling. She's admitted for ACS workup. In the field she had 324 mg of aspirin and nitroglycerin x1. Of note, she developed facial edema shortly after the aspirin was administered. She received benadryl in the ER, with some resolution. She reports feeling a little bit better since arriving but still reports some numbness and tingling in her chest. She denies any overt pain at this time. She reports that last week she had surgery for a glaucoma in her right eye, for which she takes timolol. Since surgery she has been doing light daily activity. After her activity today, she drank some coffee and tea, which were both caffeinated. She was sitting at home when the chest pain started. She reports longstanding exertional dyspnea that has not changed recently. She's had a number of recent asthma attacks for which she takes symbicort and nebulizers prn. Her most recent asthma attack was February 24th. She has a pulmonologist outpatient who started her on 28 days on 28 days off of tobramycin and two weeks of levofloxacin in February for pseudomonas in the sputum and chronic chest congestion. She completed the levofloxacin and took 16 days of tobramycin before deciding to stop Her dizziness has improved since arriving in the emergency room. She endorses chest congestion and a chronic cough with some mucus that she has trouble bringing up. She also endorses occasional palpitations where she feels her heartbeat in her R ear. She denies any recent fevers, nausea, vomiting, diarrhea, joint pain, or rashes. She endorses recent urinary frequency but denies dysuria.     Regular medications include losartan 25, farxiga 10, timolol bid, symbicort PRN, and multivitamins.

## 2024-03-18 NOTE — H&P ADULT - NSHPPHYSICALEXAM_GEN_ALL_CORE
Gen: AOx3, NAD, non-toxic, pleasant  HEAD:  Atraumatic, Normocephalic  EYES: PERRLA, conjunctiva and sclera clear  ENT: Moist mucous membranes  NECK: Supple, No JVD  CV: S1+S2 normal, no murmurs   Resp: Clear bilat, no resp distress, no crackles/wheezes  Abd: Soft, nontender, +BS  Ext: No LE edema, no cyanosis, LE pulses present  IV/Skin: No skin rash  Msk: No low back pain, no arthralgias, no joint swelling  Neuro: AAOx3. No focal deficits  Psych: no anxiety, no delusional ideas, no suicidal ideation Gen: AOx3, NAD, non-toxic, pleasant  HEAD:  Atraumatic, Normocephalic  EYES: edematous periorbital region bilaterally; teary w conjunctival injection   ENT: Dry mucous membranes  NECK: Supple, No JVD  CV: S1+S2 normal, no murmurs   Resp: Clear bilat, no resp distress, no crackles/wheezes  Abd: Soft, nontender, +BS  Ext: No LE edema, no cyanosis, LE pulses present  IV/Skin: No skin rash  Msk: No low back pain, no arthralgias, no joint swelling  Neuro: AAOx3. No focal deficits  Psych: no anxiety, no delusional ideas, no suicidal ideation

## 2024-03-18 NOTE — H&P ADULT - PROBLEM SELECTOR PLAN 1
ACS r/o, though unlikely   s/p  and nitroglycerin with EMS  **do not give aspirin, she gets angioedema**   trops negative   EKG with T wave inversion   per pt, stress test 2 years ago was benign      admit to tele   f/u TTE   f/u lipids  cardio recs appreciated

## 2024-03-18 NOTE — ED PROVIDER NOTE - OBJECTIVE STATEMENT
Declines , uses herself. 69-year-old female with history of HTN, HLD, asthma, COPD, presents with chest pain and dizziness. She states at approximately 5 PM today after returning home she began feeling dizzy as well as at the same time feeling left-sided chest pressure rating to the left arm with tingling and numbness to her bilateral jaw. Significantly improved at this time, after being given 324 aspirin and 1 nitroglycerin sublingual by EMS. Reports chronic chest congestion and exertional dyspnea without change. Denies fever, vomiting, leg pain or swelling, recent distance travel, and all other symptoms. States she had a normal stress test approximately 2 years ago here. +FHx of CAD in her mother. Non-smoker.

## 2024-03-18 NOTE — H&P ADULT - VTE RISK ASSESSMENT
Problem: Safety  Goal: Will remain free from injury  Outcome: PROGRESSING AS EXPECTED  Intervention: Educate patient and significant other/support system about adaptive mobility strategies and safe transfers  Note: Will offer assistance with transferring. Will reinforce need to call for assistance.     Problem: Psychosocial Needs:  Goal: Level of anxiety will decrease  Outcome: PROGRESSING AS EXPECTED      <<--- Click to launch

## 2024-03-18 NOTE — H&P ADULT - ATTENDING COMMENTS
IMAGING  Chest X-Ray  Pending official read; on my read no infiltrate, consolidation, or effusion noted.    EKG  Normal Sinus Rhythm, 80 bpm, QTc 426ms, LA 146ms, on my read no ST segment elevation or depression, TWI in aVL    HPI  69 year old female patient with pmhx HTN, HLD, pre-DM, asthma, COPD, and glaucoma who presented to the ER due to left-sided chest pain described as a heaviness radiating to the left arm and bilateral jaw numbness/tingling.  The patient last had a stress test 2 years ago which she states was negative.    Review Of Systems included: + chest pain, + bilateral jaw numbness/tingling, + palpitations, + lightheadedness, + gas, + urinary urgency/frequency, + dyspnea on exertion unchanged from baseline,   no injuries to chest, no recent lifting of heavy objects, no dysuria, no heartburn, no chest pain on exertion    Physical Exam  General: Awake, Alert, Oriented  HEENT: Mild swelling of eyes and lips improved since admitted to ER  Cardiac: RRR  Pulmonary: CTA b/l  Abdominal: Soft, ND, NT  Extremities: No edema b/l    A/P  # Chest pain, r/o ACS  HEART Score of 4  - Cardiac Telemetry Monitoring  - Day Team to Consult Cardiology  - ECHO  - A1C  - Lipid Panel  - Do not give Aspirin (allergic)  - Statin  - Follow up repeat Troponin  - Tylenol prn for pain    # Mild Eye and Lip swelling - improving  Likely Aspirin Allergy  Patient has been on Losartan for years; unlikely to be contributing  s/p Decadron and Benadryl in the ER  - Benadryl prn    # Hx of Asthma  # Hx of COPD  - Continue home medication Symbicort prn  - Albuterol prn    # Hx of HTN  - Continue home medication Losartan    # Hx of Glaucoma  - Continue home medication Timolol    # DVT PPx  - Heparin    # FEN  - Diabetic DASH Diet  - Monitor and replete electrolytes as needed    Previous Admissions in Past 5 Years Included  10/12/2021 - 10/13/2021: HTN Urgency and Syncope  10/10/2021: ER Visit for HTN  2/2/2021 - 2/4/2021: Uncontrolled HTN and palpitations. EKG NSR, LVH, troponin negative, Echo showed traced MR, hyperdynamic LV fx >70%, GIDD, stress test no ischemia, CTA head and neck no stenosis. Patient was seen by cardiologist    CT chest Scattered tree-in-bud nodules consistent with small airways inflammation/infection; question KOBE infection. Patient was seen by pulmonologist recommends INH and vitamin B6 for 9 months.    Patient case and management was discussed with ER Attending  I did examine all labs, imaging, prior notes

## 2024-03-18 NOTE — ED PROVIDER NOTE - CLINICAL SUMMARY MEDICAL DECISION MAKING FREE TEXT BOX
Character low suspicion for CVA, and no focal or localizing findings. No abdominal pain or tenderness, with low suspicion for acute intra-abdominal process to warrant abdominal imaging. Character low suspicion for dissection and no murmur or pulse asymmetry to warrant CT angio imaging to rule out dissection. Character low suspicion for PE and no tachycardia, hypoxia, or e/o DVT or acute risk factors for this to warrant CT angio imaging to rule this out. No e/o PNA, PTX, or fluid overload on exam or CXR, and does not warrant antibiotics or diuretics at this time. Character of some concern for ACS though ECG nonspecific and trop unremarkable, HEART score 4 and will admit for ACS workup. Already given aspirin by EMS. Of note, patient reports allergy to NSAIDs. She states was given aspirin 324 by EMS, and now having upper and lower lip swelling and swelling around her eyes and thinks this is due to the aspirin. No evidence of uvular/oropharyngeal/tongue swelling or stridor or shortness of breath. Will give Benadryl and Decadron. NAD, well appearing, hemodynamically stable. Admitted to internal medicine for further monitoring, w/u, and care.

## 2024-03-18 NOTE — H&P ADULT - NSHPREVIEWOFSYSTEMS_GEN_ALL_CORE
CONSTITUTIONAL:  No fevers or chills, good appetite, good general state  EYES/ENT:  No visual changes;  No vertigo or throat pain   NECK:  No neck pain or stiffness  RESPIRATORY:  No cough, wheezing, hemoptysis; No shortness of breath  CARDIOVASCULAR:  No chest pain or palpitations  GASTROINTESTINAL:  No abdominal pain. No nausea, vomiting, or hematemesis; No diarrhea or constipation. No melena or hematochezia.  GENITOURINARY:  No dysuria, frequency or hematuria  NEUROLOGICAL:  No HA, no numbness or LE weakness  MSK: no back pain, no joint pain  SKIN:  No itching, no skin rash CONSTITUTIONAL:  No fevers or chills, good appetite, good general state  EYES/ENT:  No visual changes;  No vertigo or throat pain   NECK:  No neck pain or stiffness  RESPIRATORY: +cough and congestion, unable to bring mucus up, SOB  CARDIOVASCULAR:  +chest pain/tingling/numbness, occasional palpitations felt in the R ear   GASTROINTESTINAL:  No abdominal pain. No nausea, vomiting, or hematemesis; No diarrhea or constipation. No melena or hematochezia.  GENITOURINARY:  No dysuria; +urinary frequency  NEUROLOGICAL:  +b/l jaw numbness/tingling   MSK: no back pain, no joint pain  SKIN:  No itching, no skin rash

## 2024-03-18 NOTE — ED PROVIDER NOTE - CARE PLAN
1 Principal Discharge DX:	Chest pain, rule out acute myocardial infarction  Secondary Diagnosis:	Allergic reaction

## 2024-03-19 DIAGNOSIS — Z02.9 ENCOUNTER FOR ADMINISTRATIVE EXAMINATIONS, UNSPECIFIED: ICD-10-CM

## 2024-03-19 DIAGNOSIS — R35.0 FREQUENCY OF MICTURITION: ICD-10-CM

## 2024-03-19 PROBLEM — I10 ESSENTIAL (PRIMARY) HYPERTENSION: Chronic | Status: ACTIVE | Noted: 2021-10-11

## 2024-03-19 LAB
A1C WITH ESTIMATED AVERAGE GLUCOSE RESULT: 6 % — HIGH (ref 4–5.6)
ANION GAP SERPL CALC-SCNC: 7 MMOL/L — SIGNIFICANT CHANGE UP (ref 5–17)
BASOPHILS # BLD AUTO: 0.01 K/UL — SIGNIFICANT CHANGE UP (ref 0–0.2)
BASOPHILS NFR BLD AUTO: 0.2 % — SIGNIFICANT CHANGE UP (ref 0–2)
BUN SERPL-MCNC: 26 MG/DL — HIGH (ref 7–18)
CALCIUM SERPL-MCNC: 9.5 MG/DL — SIGNIFICANT CHANGE UP (ref 8.4–10.5)
CHLORIDE SERPL-SCNC: 107 MMOL/L — SIGNIFICANT CHANGE UP (ref 96–108)
CHOLEST SERPL-MCNC: 254 MG/DL — HIGH
CO2 SERPL-SCNC: 23 MMOL/L — SIGNIFICANT CHANGE UP (ref 22–31)
CREAT SERPL-MCNC: 1.36 MG/DL — HIGH (ref 0.5–1.3)
CULTURE RESULTS: SIGNIFICANT CHANGE UP
EGFR: 43 ML/MIN/1.73M2 — LOW
EOSINOPHIL # BLD AUTO: 0 K/UL — SIGNIFICANT CHANGE UP (ref 0–0.5)
EOSINOPHIL NFR BLD AUTO: 0 % — SIGNIFICANT CHANGE UP (ref 0–6)
ESTIMATED AVERAGE GLUCOSE: 126 MG/DL — HIGH (ref 68–114)
GLUCOSE BLDC GLUCOMTR-MCNC: 108 MG/DL — HIGH (ref 70–99)
GLUCOSE BLDC GLUCOMTR-MCNC: 108 MG/DL — HIGH (ref 70–99)
GLUCOSE BLDC GLUCOMTR-MCNC: 92 MG/DL — SIGNIFICANT CHANGE UP (ref 70–99)
GLUCOSE BLDC GLUCOMTR-MCNC: 95 MG/DL — SIGNIFICANT CHANGE UP (ref 70–99)
GLUCOSE SERPL-MCNC: 138 MG/DL — HIGH (ref 70–99)
HCT VFR BLD CALC: 37.2 % — SIGNIFICANT CHANGE UP (ref 34.5–45)
HDLC SERPL-MCNC: 109 MG/DL — SIGNIFICANT CHANGE UP
HGB BLD-MCNC: 11.8 G/DL — SIGNIFICANT CHANGE UP (ref 11.5–15.5)
IMM GRANULOCYTES NFR BLD AUTO: 0.5 % — SIGNIFICANT CHANGE UP (ref 0–0.9)
LIPID PNL WITH DIRECT LDL SERPL: 137 MG/DL — HIGH
LYMPHOCYTES # BLD AUTO: 1.29 K/UL — SIGNIFICANT CHANGE UP (ref 1–3.3)
LYMPHOCYTES # BLD AUTO: 21.4 % — SIGNIFICANT CHANGE UP (ref 13–44)
MCHC RBC-ENTMCNC: 27.4 PG — SIGNIFICANT CHANGE UP (ref 27–34)
MCHC RBC-ENTMCNC: 31.7 GM/DL — LOW (ref 32–36)
MCV RBC AUTO: 86.3 FL — SIGNIFICANT CHANGE UP (ref 80–100)
MONOCYTES # BLD AUTO: 0.04 K/UL — SIGNIFICANT CHANGE UP (ref 0–0.9)
MONOCYTES NFR BLD AUTO: 0.7 % — LOW (ref 2–14)
NEUTROPHILS # BLD AUTO: 4.67 K/UL — SIGNIFICANT CHANGE UP (ref 1.8–7.4)
NEUTROPHILS NFR BLD AUTO: 77.2 % — HIGH (ref 43–77)
NON HDL CHOLESTEROL: 145 MG/DL — HIGH
NRBC # BLD: 0 /100 WBCS — SIGNIFICANT CHANGE UP (ref 0–0)
PLATELET # BLD AUTO: 281 K/UL — SIGNIFICANT CHANGE UP (ref 150–400)
POTASSIUM SERPL-MCNC: 5 MMOL/L — SIGNIFICANT CHANGE UP (ref 3.5–5.3)
POTASSIUM SERPL-SCNC: 5 MMOL/L — SIGNIFICANT CHANGE UP (ref 3.5–5.3)
RBC # BLD: 4.31 M/UL — SIGNIFICANT CHANGE UP (ref 3.8–5.2)
RBC # FLD: 15.9 % — HIGH (ref 10.3–14.5)
SODIUM SERPL-SCNC: 137 MMOL/L — SIGNIFICANT CHANGE UP (ref 135–145)
SPECIMEN SOURCE: SIGNIFICANT CHANGE UP
TRIGL SERPL-MCNC: 40 MG/DL — SIGNIFICANT CHANGE UP
TROPONIN I, HIGH SENSITIVITY RESULT: 32.1 NG/L — SIGNIFICANT CHANGE UP
WBC # BLD: 6.04 K/UL — SIGNIFICANT CHANGE UP (ref 3.8–10.5)
WBC # FLD AUTO: 6.04 K/UL — SIGNIFICANT CHANGE UP (ref 3.8–10.5)

## 2024-03-19 PROCEDURE — 99232 SBSQ HOSP IP/OBS MODERATE 35: CPT | Mod: GC

## 2024-03-19 PROCEDURE — 99223 1ST HOSP IP/OBS HIGH 75: CPT

## 2024-03-19 RX ORDER — ENOXAPARIN SODIUM 100 MG/ML
30 INJECTION SUBCUTANEOUS EVERY 24 HOURS
Refills: 0 | Status: DISCONTINUED | OUTPATIENT
Start: 2024-03-19 | End: 2024-03-20

## 2024-03-19 RX ORDER — SODIUM CHLORIDE 9 MG/ML
1000 INJECTION, SOLUTION INTRAVENOUS
Refills: 0 | Status: DISCONTINUED | OUTPATIENT
Start: 2024-03-19 | End: 2024-03-20

## 2024-03-19 RX ORDER — ONDANSETRON 8 MG/1
4 TABLET, FILM COATED ORAL EVERY 8 HOURS
Refills: 0 | Status: DISCONTINUED | OUTPATIENT
Start: 2024-03-19 | End: 2024-03-20

## 2024-03-19 RX ORDER — PREDNISOLONE SODIUM PHOSPHATE 1 %
1 DROPS OPHTHALMIC (EYE) EVERY 6 HOURS
Refills: 0 | Status: DISCONTINUED | OUTPATIENT
Start: 2024-03-19 | End: 2024-03-20

## 2024-03-19 RX ORDER — ACETAMINOPHEN 500 MG
650 TABLET ORAL EVERY 6 HOURS
Refills: 0 | Status: DISCONTINUED | OUTPATIENT
Start: 2024-03-19 | End: 2024-03-20

## 2024-03-19 RX ORDER — INSULIN LISPRO 100/ML
VIAL (ML) SUBCUTANEOUS
Refills: 0 | Status: DISCONTINUED | OUTPATIENT
Start: 2024-03-19 | End: 2024-03-20

## 2024-03-19 RX ORDER — SIMVASTATIN 20 MG/1
5 TABLET, FILM COATED ORAL AT BEDTIME
Refills: 0 | Status: DISCONTINUED | OUTPATIENT
Start: 2024-03-19 | End: 2024-03-20

## 2024-03-19 RX ORDER — LOSARTAN POTASSIUM 100 MG/1
25 TABLET, FILM COATED ORAL
Refills: 0 | Status: DISCONTINUED | OUTPATIENT
Start: 2024-03-19 | End: 2024-03-20

## 2024-03-19 RX ADMIN — Medication 1 DROP(S): at 06:29

## 2024-03-19 RX ADMIN — LOSARTAN POTASSIUM 25 MILLIGRAM(S): 100 TABLET, FILM COATED ORAL at 06:28

## 2024-03-19 RX ADMIN — Medication 1 DROP(S): at 23:14

## 2024-03-19 RX ADMIN — Medication 1 DROP(S): at 15:57

## 2024-03-19 RX ADMIN — BUDESONIDE AND FORMOTEROL FUMARATE DIHYDRATE 2 PUFF(S): 160; 4.5 AEROSOL RESPIRATORY (INHALATION) at 12:48

## 2024-03-19 RX ADMIN — Medication 1 DROP(S): at 18:01

## 2024-03-19 RX ADMIN — ENOXAPARIN SODIUM 30 MILLIGRAM(S): 100 INJECTION SUBCUTANEOUS at 09:05

## 2024-03-19 RX ADMIN — ATORVASTATIN CALCIUM 40 MILLIGRAM(S): 80 TABLET, FILM COATED ORAL at 22:05

## 2024-03-19 RX ADMIN — Medication 5 MILLIGRAM(S): at 22:05

## 2024-03-19 RX ADMIN — SODIUM CHLORIDE 100 MILLILITER(S): 9 INJECTION, SOLUTION INTRAVENOUS at 06:34

## 2024-03-19 RX ADMIN — Medication 1 DROP(S): at 18:00

## 2024-03-19 RX ADMIN — Medication 650 MILLIGRAM(S): at 22:05

## 2024-03-19 RX ADMIN — Medication 650 MILLIGRAM(S): at 23:00

## 2024-03-19 RX ADMIN — BUDESONIDE AND FORMOTEROL FUMARATE DIHYDRATE 2 PUFF(S): 160; 4.5 AEROSOL RESPIRATORY (INHALATION) at 23:14

## 2024-03-19 NOTE — PROGRESS NOTE ADULT - PROBLEM SELECTOR PLAN 4
Not in exacerbation  c/w home meds   no smoking history  Endorses chronic "asthma attacks" for which she takes albuterol  mucinex

## 2024-03-19 NOTE — PROGRESS NOTE ADULT - PROBLEM SELECTOR PLAN 5
Reason for exam: screening  (asymptomatic).

Last mammogram was performed 1 year and 5 months ago.



History:

Patient is postmenopausal.

Benign cyst aspiration of the right breast.

Took estrogen for 10 years.  Took progesterone for 10 years.



Physical Findings:

A clinical breast exam by your physician is recommended on an annual basis and 

results should be correlated with mammographic findings.



MG 3D Screening Mammo W/Cad

Bilateral CC and MLO view(s) were taken.

Prior study comparison: March 20, 2018, bilateral MG 3d screening mammo w/cad.  

December 20, 2016, bilateral MG 3d screening mammo w/cad.

There are scattered fibroglandular densities.  No significant changes when 

compared with prior studies.





ASSESSMENT: Negative, BI-RAD 1



RECOMMENDATION:

Routine screening mammogram of both breasts in 1 year. c/w statin

## 2024-03-19 NOTE — PROGRESS NOTE ADULT - PROBLEM SELECTOR PLAN 9
Pt. is from home, currently medically stable  Discharge pending TTE and nuclear exercise stress test

## 2024-03-19 NOTE — PATIENT PROFILE ADULT - FALL HARM RISK - UNIVERSAL INTERVENTIONS
Bed in lowest position, wheels locked, appropriate side rails in place/Call bell, personal items and telephone in reach/Instruct patient to call for assistance before getting out of bed or chair/Non-slip footwear when patient is out of bed/[place] to call system/Physically safe environment - no spills, clutter or unnecessary equipment/Purposeful Proactive Rounding/Room/bathroom lighting operational, light cord in reach

## 2024-03-19 NOTE — CONSULT NOTE ADULT - ASSESSMENT
Assessment and Recommendations:  69 year old F with HTN, HLD, asthma/COPD who presented with dizziness/L sided chest discomfort/jaw numbness in setting of elevated BP. Cardiology consulted in this setting.    1) Chest discomfort, intermittent, L sided, not related to exertion  - EKG showed sinus rhythm without ischemic changes  - She is euvolemic on exam. She still has intermittent chest discomfort. Troponins were negative x 2  - Given her risk factors, check exercise nuclear stress test  - Check TTE   - Of note, pt reports lip/eye swelling with ASA. Will need ASA desensitization if she ever needs aspirin/PCI.    2) HTN  - Continue home losartan    Edison Gaines MD ([facility] [facility] TEAMS Message PREFERRED)  Cardiology Attending  [facility] [place] - Cardiology  [address], 4[address], [address], [place] [address]  Office: [phone]    All Cardiology service information can be found 24/7 on [email or web address], password: [email or web address]

## 2024-03-19 NOTE — ED ADULT NURSE NOTE - NSFALLUNIVINTERV_ED_ALL_ED
Bed/Stretcher in lowest position, wheels locked, appropriate side rails in place/Call bell, personal items and telephone in reach/Instruct patient to call for assistance before getting out of bed/chair/stretcher/Non-slip footwear applied when patient is off stretcher/[place] to call system/Physically safe environment - no spills, clutter or unnecessary equipment/Purposeful proactive rounding/Room/bathroom lighting operational, light cord in reach

## 2024-03-19 NOTE — CONSULT NOTE ADULT - SUBJECTIVE AND OBJECTIVE BOX
Patient seen and evaluated at bedside    Chief Complaint: chest discomfort, dizziness    HPI:  69 year old woman with hypertension, hyperlipidemia, asthma, COPD, prediabetes, and glaucoma who is coming for one day of chest pain and bilateral jaw numbness and tingling. She's admitted for ACS workup. In the field she had 324 mg of aspirin and nitroglycerin x1. Of note, she developed facial edema shortly after the aspirin was administered. She received benadryl in the ER, with some resolution. She reports feeling a little bit better since arriving but still reports some numbness and tingling in her chest. She denies any overt pain at this time. She reports that last week she had surgery for a glaucoma in her right eye, for which she takes timolol. Since surgery she has been doing light daily activity. After her activity today, she drank some coffee and tea, which were both caffeinated. She was sitting at home when the chest pain started. She reports longstanding exertional dyspnea that has not changed recently. She's had a number of recent asthma attacks for which she takes symbicort and nebulizers prn. Her most recent asthma attack was . She has a pulmonologist outpatient who started her on 28 days on 28 days off of tobramycin and two weeks of levofloxacin in February for pseudomonas in the sputum and chronic chest congestion. She completed the levofloxacin and took 16 days of tobramycin before deciding to stop Her dizziness has improved since arriving in the emergency room. She endorses chest congestion and a chronic cough with some mucus that she has trouble bringing up. She also endorses occasional palpitations where she feels her heartbeat in her R ear. She denies any recent fevers, nausea, vomiting, diarrhea, joint pain, or rashes. She endorses recent urinary frequency but denies dysuria.     Regular medications include losartan 25, farxiga 10, timolol bid, symbicort PRN, and multivitamins. (18 Mar 2024 22:14)    On my evaluation, pt reports that his blood pressure went high and she started to have dizziness, L sided chest discomfort with jaw discomfort. She reports intermittent L sided chest discomfort over the past month that occurs randomly. No SOB, orthopnea, PND, LE edema, or LOC. She states her stress test 2-3 years ago was unremarkable.     PMHx:   COPD (chronic obstructive pulmonary disease)    Sixth nerve palsy    HTN (hypertension)    Uncontrolled hypertension    Postural dizziness with presyncope        PSHx:   S/P RANDOLPH-BSO    S/P thyroid surgery        Allergies:  aspirin (Angioedema)  Motrin (Swelling)      Home Meds:    Current Medications:   acetaminophen     Tablet .. 650 milliGRAM(s) Oral every 6 hours PRN  albuterol    90 MICROgram(s) HFA Inhaler 2 Puff(s) Inhalation every 6 hours PRN  aluminum hydroxide/magnesium hydroxide/simethicone Suspension 30 milliLiter(s) Oral every 6 hours PRN  atorvastatin 40 milliGRAM(s) Oral at bedtime  budesonide  80 MICROgram(s)/formoterol 4.5 MICROgram(s) Inhaler 2 Puff(s) Inhalation two times a day  enoxaparin Injectable 30 milliGRAM(s) SubCutaneous every 24 hours  insulin lispro (ADMELOG) corrective regimen sliding scale   SubCutaneous Before meals and at bedtime  lactated ringers. 1000 milliLiter(s) IV Continuous <Continuous>  losartan 25 milliGRAM(s) Oral two times a day  melatonin 5 milliGRAM(s) Oral at bedtime  ondansetron Injectable 4 milliGRAM(s) IV Push every 8 hours PRN  prednisoLONE acetate 1% Suspension 1 Drop(s) Right EYE every 6 hours  timolol 0.25% Solution 1 Drop(s) Right EYE two times a day      FAMILY HISTORY:      Social History:  Smoking History:  Alcohol Use:  Drug Use:    REVIEW OF SYSTEMS:  CONSTITUTIONAL: No weakness, fevers or chills  EYES/ENT: No visual changes;  No dysphagia  NECK: No pain or stiffness  RESPIRATORY: No cough, wheezing, hemoptysis; No shortness of breath  CARDIOVASCULAR: No chest pain or palpitations; No lower extremity edema  GASTROINTESTINAL: No abdominal or epigastric pain. No nausea, vomiting, or hematemesis; No diarrhea or constipation. No melena or hematochezia.  BACK: No back pain  GENITOURINARY: No dysuria, frequency or hematuria  NEUROLOGICAL: No numbness or weakness  SKIN: No itching, burning, rashes, or lesions   All other review of systems is negative unless indicated above.    Physical Exam:  T(F): 98.3 (03-19), Max: 98.4 (03-18)  HR: 69 (-19) (57 - 75)  BP: 113/69 (03-19) (113/69 - 164/85)  RR: 17 (-)  SpO2: 98% (-)  GENERAL: No acute distress, well-developed  HEAD:  Atraumatic, Normocephalic  ENT: EOMI, PERRLA, conjunctiva and sclera clear, Neck supple, No JVD, moist mucosa  CHEST/LUNG: Clear to auscultation bilaterally; No wheeze, equal breath sounds bilaterally   BACK: No spinal tenderness  HEART: Regular rate and rhythm; No murmurs, rubs, or gallops  ABDOMEN: Soft, Nontender, Nondistended; Bowel sounds present  EXTREMITIES:  No clubbing, cyanosis, or edema  PSYCH: Nl behavior, nl affect  NEUROLOGY: AAOx3, non-focal, cranial nerves intact  SKIN: Normal color, No rashes or lesions  LINES:    Labs:  reviewed                        11.8   6.04  )-----------( 281      ( 19 Mar 2024 05:21 )             37.2     -    137  |  107  |  26<H>  ----------------------------<  138<H>  5.0   |  23  |  1.36<H>    Ca    9.5      19 Mar 2024 05:21    TPro  7.4  /  Alb  3.2<L>  /  TBili  0.3  /  DBili  x   /  AST  48<H>  /  ALT  54  /  AlkPhos  64  03-18    PT/INR - ( 18 Mar 2024 21:06 )   PT: 10.2 sec;   INR: 0.89 ratio         PTT - ( 18 Mar 2024 21:06 )  PTT:32.7 sec        Total Cholesterol: 254  LDL: --  HDL: 109  T    Cardiovascular Diagnostic Testing:    ECG: sinus rhythm    Imaging:    CXR:  reviewed

## 2024-03-19 NOTE — PATIENT PROFILE ADULT - NSPROPTRIGHTSUPPORTPHONE_GEN_A_NUR
----- Message from Fern Valenzuela sent at 9/20/2018  4:14 PM CDT -----  Contact: self  Patient has a question about a pedicular medicine please call back at [phone] (home).  Thank you!   [phone]

## 2024-03-19 NOTE — PROGRESS NOTE ADULT - PROBLEM SELECTOR PLAN 1
ACS r/o, though unlikely   s/p  and nitroglycerin with EMS  **do not give aspirin, she gets angioedema**   Will need ASA desensitization if she ever needs aspirin/PCI per cardio  Card Dr. Gaines following  EKG showed sinus rhythm without ischemic changes  Troponins negative x 2  f/u exercise nuclear stress test  f/u TTE

## 2024-03-19 NOTE — PROGRESS NOTE ADULT - PROBLEM SELECTOR PLAN 2
195/93 in ER   takes losartan 25 BID at home though has not taken it consistently recently bc BP has been normal   c/w losartan  Educated re importance of compliance with meds in general and anti hypertensives in particular  DASH diet  b/p improved

## 2024-03-20 VITALS
SYSTOLIC BLOOD PRESSURE: 116 MMHG | OXYGEN SATURATION: 97 % | TEMPERATURE: 98 F | DIASTOLIC BLOOD PRESSURE: 67 MMHG | RESPIRATION RATE: 17 BRPM | HEART RATE: 63 BPM

## 2024-03-20 LAB
ANION GAP SERPL CALC-SCNC: 3 MMOL/L — LOW (ref 5–17)
BUN SERPL-MCNC: 26 MG/DL — HIGH (ref 7–18)
CALCIUM SERPL-MCNC: 8.8 MG/DL — SIGNIFICANT CHANGE UP (ref 8.4–10.5)
CHLORIDE SERPL-SCNC: 108 MMOL/L — SIGNIFICANT CHANGE UP (ref 96–108)
CO2 SERPL-SCNC: 26 MMOL/L — SIGNIFICANT CHANGE UP (ref 22–31)
CREAT SERPL-MCNC: 1.33 MG/DL — HIGH (ref 0.5–1.3)
EGFR: 44 ML/MIN/1.73M2 — LOW
GLUCOSE BLDC GLUCOMTR-MCNC: 100 MG/DL — HIGH (ref 70–99)
GLUCOSE BLDC GLUCOMTR-MCNC: 102 MG/DL — HIGH (ref 70–99)
GLUCOSE BLDC GLUCOMTR-MCNC: 99 MG/DL — SIGNIFICANT CHANGE UP (ref 70–99)
GLUCOSE SERPL-MCNC: 105 MG/DL — HIGH (ref 70–99)
HCT VFR BLD CALC: 33.1 % — LOW (ref 34.5–45)
HGB BLD-MCNC: 10.7 G/DL — LOW (ref 11.5–15.5)
MAGNESIUM SERPL-MCNC: 2 MG/DL — SIGNIFICANT CHANGE UP (ref 1.6–2.6)
MCHC RBC-ENTMCNC: 27.7 PG — SIGNIFICANT CHANGE UP (ref 27–34)
MCHC RBC-ENTMCNC: 32.3 GM/DL — SIGNIFICANT CHANGE UP (ref 32–36)
MCV RBC AUTO: 85.8 FL — SIGNIFICANT CHANGE UP (ref 80–100)
NRBC # BLD: 0 /100 WBCS — SIGNIFICANT CHANGE UP (ref 0–0)
PHOSPHATE SERPL-MCNC: 3.9 MG/DL — SIGNIFICANT CHANGE UP (ref 2.5–4.5)
PLATELET # BLD AUTO: 253 K/UL — SIGNIFICANT CHANGE UP (ref 150–400)
POTASSIUM SERPL-MCNC: 4.4 MMOL/L — SIGNIFICANT CHANGE UP (ref 3.5–5.3)
POTASSIUM SERPL-SCNC: 4.4 MMOL/L — SIGNIFICANT CHANGE UP (ref 3.5–5.3)
RBC # BLD: 3.86 M/UL — SIGNIFICANT CHANGE UP (ref 3.8–5.2)
RBC # FLD: 16.4 % — HIGH (ref 10.3–14.5)
SODIUM SERPL-SCNC: 137 MMOL/L — SIGNIFICANT CHANGE UP (ref 135–145)
WBC # BLD: 6.16 K/UL — SIGNIFICANT CHANGE UP (ref 3.8–10.5)
WBC # FLD AUTO: 6.16 K/UL — SIGNIFICANT CHANGE UP (ref 3.8–10.5)

## 2024-03-20 PROCEDURE — 85027 COMPLETE CBC AUTOMATED: CPT

## 2024-03-20 PROCEDURE — 93017 CV STRESS TEST TRACING ONLY: CPT

## 2024-03-20 PROCEDURE — 87086 URINE CULTURE/COLONY COUNT: CPT

## 2024-03-20 PROCEDURE — 71046 X-RAY EXAM CHEST 2 VIEWS: CPT

## 2024-03-20 PROCEDURE — 99233 SBSQ HOSP IP/OBS HIGH 50: CPT

## 2024-03-20 PROCEDURE — 83735 ASSAY OF MAGNESIUM: CPT

## 2024-03-20 PROCEDURE — 80048 BASIC METABOLIC PNL TOTAL CA: CPT

## 2024-03-20 PROCEDURE — 78452 HT MUSCLE IMAGE SPECT MULT: CPT | Mod: MC

## 2024-03-20 PROCEDURE — 85730 THROMBOPLASTIN TIME PARTIAL: CPT

## 2024-03-20 PROCEDURE — 93005 ELECTROCARDIOGRAM TRACING: CPT

## 2024-03-20 PROCEDURE — 87637 SARSCOV2&INF A&B&RSV AMP PRB: CPT

## 2024-03-20 PROCEDURE — 36415 COLL VENOUS BLD VENIPUNCTURE: CPT

## 2024-03-20 PROCEDURE — 99285 EMERGENCY DEPT VISIT HI MDM: CPT | Mod: 25

## 2024-03-20 PROCEDURE — 94640 AIRWAY INHALATION TREATMENT: CPT

## 2024-03-20 PROCEDURE — A9502: CPT

## 2024-03-20 PROCEDURE — 82962 GLUCOSE BLOOD TEST: CPT

## 2024-03-20 PROCEDURE — 84100 ASSAY OF PHOSPHORUS: CPT

## 2024-03-20 PROCEDURE — 85610 PROTHROMBIN TIME: CPT

## 2024-03-20 PROCEDURE — 85025 COMPLETE CBC W/AUTO DIFF WBC: CPT

## 2024-03-20 PROCEDURE — 81001 URINALYSIS AUTO W/SCOPE: CPT

## 2024-03-20 PROCEDURE — 93306 TTE W/DOPPLER COMPLETE: CPT

## 2024-03-20 PROCEDURE — 80053 COMPREHEN METABOLIC PANEL: CPT

## 2024-03-20 PROCEDURE — 80061 LIPID PANEL: CPT

## 2024-03-20 PROCEDURE — 83036 HEMOGLOBIN GLYCOSYLATED A1C: CPT

## 2024-03-20 PROCEDURE — 84484 ASSAY OF TROPONIN QUANT: CPT

## 2024-03-20 PROCEDURE — 99239 HOSP IP/OBS DSCHRG MGMT >30: CPT

## 2024-03-20 RX ORDER — LOSARTAN POTASSIUM 100 MG/1
1 TABLET, FILM COATED ORAL
Qty: 30 | Refills: 0
Start: 2024-03-20 | End: 2024-04-18

## 2024-03-20 RX ORDER — TIMOLOL 0.5 %
1 DROPS OPHTHALMIC (EYE)
Qty: 0 | Refills: 0 | DISCHARGE
Start: 2024-03-20

## 2024-03-20 RX ORDER — PREDNISOLONE SODIUM PHOSPHATE 1 %
1 DROPS OPHTHALMIC (EYE)
Qty: 0 | Refills: 0 | DISCHARGE
Start: 2024-03-20

## 2024-03-20 RX ADMIN — ENOXAPARIN SODIUM 30 MILLIGRAM(S): 100 INJECTION SUBCUTANEOUS at 09:12

## 2024-03-20 RX ADMIN — BUDESONIDE AND FORMOTEROL FUMARATE DIHYDRATE 2 PUFF(S): 160; 4.5 AEROSOL RESPIRATORY (INHALATION) at 09:12

## 2024-03-20 RX ADMIN — Medication 1 DROP(S): at 13:12

## 2024-03-20 RX ADMIN — Medication 1 DROP(S): at 05:53

## 2024-03-20 RX ADMIN — Medication 1 DROP(S): at 18:05

## 2024-03-20 RX ADMIN — Medication 1 DROP(S): at 05:54

## 2024-03-20 NOTE — DISCHARGE NOTE PROVIDER - NSDCQMAMI_CARD_ALL_CORE
No Calcipotriene Pregnancy And Lactation Text: The use of this medication during pregnancy or lactation is not recommended as there is insufficient data.

## 2024-03-20 NOTE — DISCHARGE NOTE PROVIDER - CARE PROVIDER_API CALL
Raymundo Hutchins  Cardiovascular Disease  [address] [facility], [facility] [address]  [place] [address]  Phone: [phone]  Fax: [phone]  Follow Up Time: 1 month

## 2024-03-20 NOTE — CHART NOTE - NSCHARTNOTEFT_GEN_A_CORE
Attempted to call patient's PCP office- Dr. Margaret Chaudhary but office was closed. Will reach out in morning.

## 2024-03-20 NOTE — PROGRESS NOTE ADULT - SUBJECTIVE AND OBJECTIVE BOX
Overnight Events: KETAN pt feels better this AM    Telemetry: sinus 80s    Review Of Systems: No chest pain, shortness of breath, or palpitations  CONSTITUTIONAL: no fevers or chills  EYES/ENT: No visual changes;  No vertigo or throat pain   NECK: No pain or stiffness  RESPIRATORY: No cough, wheezing. No shortness of breath  CARDIOVASCULAR: No chest pain or palpitations  GASTROINTESTINAL: No abdominal or epigastric pain. No nausea, vomiting. No diarrhea. No melena.  GENITOURINARY: No dysuria, frequency or hematuria  NEUROLOGICAL: No numbness or weakness  SKIN: No itching, burning, rashes, or lesions   All other review of systems is negative unless indicated above.     Current Meds:  acetaminophen     Tablet .. 650 milliGRAM(s) Oral every 6 hours PRN  albuterol    90 MICROgram(s) HFA Inhaler 2 Puff(s) Inhalation every 6 hours PRN  aluminum hydroxide/magnesium hydroxide/simethicone Suspension 30 milliLiter(s) Oral every 6 hours PRN  budesonide  80 MICROgram(s)/formoterol 4.5 MICROgram(s) Inhaler 2 Puff(s) Inhalation two times a day  enoxaparin Injectable 30 milliGRAM(s) SubCutaneous every 24 hours  insulin lispro (ADMELOG) corrective regimen sliding scale   SubCutaneous Before meals and at bedtime  lactated ringers. 1000 milliLiter(s) IV Continuous <Continuous>  losartan 25 milliGRAM(s) Oral two times a day  melatonin 5 milliGRAM(s) Oral at bedtime  ondansetron Injectable 4 milliGRAM(s) IV Push every 8 hours PRN  prednisoLONE acetate 1% Suspension 1 Drop(s) Right EYE every 6 hours  simvastatin 5 milliGRAM(s) Oral at bedtime  timolol 0.25% Solution 1 Drop(s) Right EYE two times a day    Vitals:  T(F): 98.2 (03-20), Max: 98.2 (03-19)  HR: 66 (03-20) (52 - 81)  BP: 124/69 (03-20) (98/61 - 124/69)  RR: 18 (03-20)  SpO2: 99% (03-20)  I&O's Summary    19 Mar 2024 07:01  -  20 Mar 2024 07:00  --------------------------------------------------------  IN: 290 mL / OUT: 0 mL / NET: 290 mL    Physical Exam:  Appearance: No acute distress; well appearing  Eyes: PERRL, EOMI, pink conjunctiva  HEENT: Normal oral mucosa  Cardiovascular: RRR, S1, S2, no murmurs, rubs, or gallops; no JVD  Respiratory: Clear to auscultation bilaterally  Gastrointestinal: soft, non-tender, non-distended with normal bowel sounds  Extremities: No edema  Musculoskeletal: No clubbing; no joint deformity   Neurologic: Non-focal  Lymphatic: No lymphadenopathy  Psychiatry: AAOx3, mood & affect appropriate  Skin: No rashes, ecchymoses, or cyanosis                          10.7   6.16  )-----------( 253      ( 20 Mar 2024 07:06 )             33.1     03-20    137  |  108  |  26<H>  ----------------------------<  105<H>  4.4   |  26  |  1.33<H>    Ca    8.8      20 Mar 2024 07:06  Phos  3.9     03-20  Mg     2.0     03-20    TPro  7.4  /  Alb  3.2<L>  /  TBili  0.3  /  DBili  x   /  AST  48<H>  /  ALT  54  /  AlkPhos  64  03-18    PT/INR - ( 18 Mar 2024 21:06 )   PT: 10.2 sec;   INR: 0.89 ratio         PTT - ( 18 Mar 2024 21:06 )  PTT:32.7 sec    
PGY 1 Note discussed with supervising resident and primary attending        INTERVAL HPI/OVERNIGHT EVENTS: no events noted overnight. Patient reports left sided chest pain and pressure which is non-exertional and has improved. her lip swelling has completely resolved.     MEDICATIONS  (STANDING):  atorvastatin 40 milliGRAM(s) Oral at bedtime  budesonide  80 MICROgram(s)/formoterol 4.5 MICROgram(s) Inhaler 2 Puff(s) Inhalation two times a day  enoxaparin Injectable 30 milliGRAM(s) SubCutaneous every 24 hours  insulin lispro (ADMELOG) corrective regimen sliding scale   SubCutaneous Before meals and at bedtime  lactated ringers. 1000 milliLiter(s) (100 mL/Hr) IV Continuous <Continuous>  losartan 25 milliGRAM(s) Oral two times a day  melatonin 5 milliGRAM(s) Oral at bedtime  prednisoLONE acetate 1% Suspension 1 Drop(s) Right EYE every 6 hours  timolol 0.25% Solution 1 Drop(s) Right EYE two times a day    MEDICATIONS  (PRN):  acetaminophen     Tablet .. 650 milliGRAM(s) Oral every 6 hours PRN Temp greater or equal to 38C (100.4F), Mild Pain (1 - 3)  albuterol    90 MICROgram(s) HFA Inhaler 2 Puff(s) Inhalation every 6 hours PRN Shortness of Breath and/or Wheezing  aluminum hydroxide/magnesium hydroxide/simethicone Suspension 30 milliLiter(s) Oral every 6 hours PRN Dyspepsia  ondansetron Injectable 4 milliGRAM(s) IV Push every 8 hours PRN Nausea and/or Vomiting      __________________________________________________  REVIEW OF SYSTEMS:    CONSTITUTIONAL: No fever,   EYES: no acute visual disturbances  NECK: No pain or stiffness  RESPIRATORY: No cough; No shortness of breath  CARDIOVASCULAR: No chest pain, no palpitations  GASTROINTESTINAL: No pain. No nausea or vomiting; No diarrhea   NEUROLOGICAL: No headache or numbness, no tremors  MUSCULOSKELETAL: No joint pain, no muscle pain  GENITOURINARY: no dysuria, no frequency, no hesitancy  PSYCHIATRY: no depression , no anxiety  ALL OTHER  ROS negative        Vital Signs Last 24 Hrs  T(C): 36.8 (19 Mar 2024 11:20), Max: 36.9 (18 Mar 2024 19:05)  T(F): 98.3 (19 Mar 2024 11:20), Max: 98.4 (18 Mar 2024 19:05)  HR: 69 (19 Mar 2024 11:20) (57 - 75)  BP: 113/69 (19 Mar 2024 11:20) (113/69 - 164/85)  BP(mean): 94 (19 Mar 2024 00:14) (94 - 94)  RR: 17 (19 Mar 2024 11:20) (16 - 19)  SpO2: 98% (19 Mar 2024 11:20) (96% - 100%)    Parameters below as of 19 Mar 2024 11:20  Patient On (Oxygen Delivery Method): room air        ________________________________________________  PHYSICAL EXAM:  GENERAL: NAD  HEENT: Normocephalic;  conjunctivae and sclerae clear; moist mucous membranes;   NECK : supple  CHEST/LUNG: Clear to auscultation bilaterally with good air entry   HEART: S1 S2  regular; no murmurs, gallops or rubs  ABDOMEN: Soft, Nontender, Nondistended; Bowel sounds present  EXTREMITIES: no cyanosis; no edema; no calf tenderness  SKIN: warm and dry; no rash  NERVOUS SYSTEM:  Awake and alert; Oriented  to place, person and time ; no new deficits    _________________________________________________  LABS:                        11.8   6.04  )-----------( 281      ( 19 Mar 2024 05:21 )             37.2     03-19    137  |  107  |  26<H>  ----------------------------<  138<H>  5.0   |  23  |  1.36<H>    Ca    9.5      19 Mar 2024 05:21    TPro  7.4  /  Alb  3.2<L>  /  TBili  0.3  /  DBili  x   /  AST  48<H>  /  ALT  54  /  AlkPhos  64  03-18    PT/INR - ( 18 Mar 2024 21:06 )   PT: 10.2 sec;   INR: 0.89 ratio         PTT - ( 18 Mar 2024 21:06 )  PTT:32.7 sec  Urinalysis Basic - ( 19 Mar 2024 05:21 )    Color: x / Appearance: x / SG: x / pH: x  Gluc: 138 mg/dL / Ketone: x  / Bili: x / Urobili: x   Blood: x / Protein: x / Nitrite: x   Leuk Esterase: x / RBC: x / WBC x   Sq Epi: x / Non Sq Epi: x / Bacteria: x      CAPILLARY BLOOD GLUCOSE      POCT Blood Glucose.: 92 mg/dL (19 Mar 2024 12:35)  POCT Blood Glucose.: 108 mg/dL (19 Mar 2024 08:36)        RADIOLOGY & ADDITIONAL TESTS:    Imaging Personally Reviewed:  YES    Consultant(s) Notes Reviewed:   YES    Care Discussed with Consultants : YES     Plan of care was discussed with patient and /or primary care giver; all questions and concerns were addressed and care was aligned with patient's wishes.    
NP Note discussed with  Primary Attending    Patient is a 69y old  Female who presents with a chief complaint of ACS r/o (19 Mar 2024 15:43).  Seen and examined at bedside.  Comfortable with no c/o CP or SOB.      INTERVAL HPI/OVERNIGHT EVENTS: no new complaints    MEDICATIONS  (STANDING):  atorvastatin 40 milliGRAM(s) Oral at bedtime  budesonide  80 MICROgram(s)/formoterol 4.5 MICROgram(s) Inhaler 2 Puff(s) Inhalation two times a day  enoxaparin Injectable 30 milliGRAM(s) SubCutaneous every 24 hours  insulin lispro (ADMELOG) corrective regimen sliding scale   SubCutaneous Before meals and at bedtime  lactated ringers. 1000 milliLiter(s) (100 mL/Hr) IV Continuous <Continuous>  losartan 25 milliGRAM(s) Oral two times a day  melatonin 5 milliGRAM(s) Oral at bedtime  prednisoLONE acetate 1% Suspension 1 Drop(s) Right EYE every 6 hours  timolol 0.25% Solution 1 Drop(s) Right EYE two times a day    MEDICATIONS  (PRN):  acetaminophen     Tablet .. 650 milliGRAM(s) Oral every 6 hours PRN Temp greater or equal to 38C (100.4F), Mild Pain (1 - 3)  albuterol    90 MICROgram(s) HFA Inhaler 2 Puff(s) Inhalation every 6 hours PRN Shortness of Breath and/or Wheezing  aluminum hydroxide/magnesium hydroxide/simethicone Suspension 30 milliLiter(s) Oral every 6 hours PRN Dyspepsia  ondansetron Injectable 4 milliGRAM(s) IV Push every 8 hours PRN Nausea and/or Vomiting      __________________________________________________  REVIEW OF SYSTEMS:    CONSTITUTIONAL: No fever,   EYES: no acute visual disturbances  NECK: No pain or stiffness  RESPIRATORY: No cough; No shortness of breath  CARDIOVASCULAR: No chest pain, no palpitations  GASTROINTESTINAL: No pain. No nausea or vomiting; No diarrhea   NEUROLOGICAL: No headache or numbness, no tremors  MUSCULOSKELETAL: No joint pain, no muscle pain  GENITOURINARY: no dysuria, no frequency, no hesitancy  PSYCHIATRY: no depression , no anxiety  ALL OTHER  ROS negative        Vital Signs Last 24 Hrs  T(C): 36.5 (19 Mar 2024 15:46), Max: 36.9 (18 Mar 2024 19:05)  T(F): 97.7 (19 Mar 2024 15:46), Max: 98.4 (18 Mar 2024 19:05)  HR: 57 (19 Mar 2024 15:46) (57 - 75)  BP: 106/69 (19 Mar 2024 15:46) (106/69 - 164/85)  BP(mean): 81 (19 Mar 2024 15:46) (81 - 94)  RR: 18 (19 Mar 2024 15:46) (16 - 19)  SpO2: 97% (19 Mar 2024 15:46) (96% - 100%)    Parameters below as of 19 Mar 2024 15:46  Patient On (Oxygen Delivery Method): room air        ________________________________________________  PHYSICAL EXAM:  Well groomed,   GENERAL: NAD  HEENT: Normocephalic;  conjunctivae and sclerae clear; moist mucous membranes;   NECK : supple  CHEST/LUNG: Clear to auscultation bilaterally with good air entry   HEART: S1 S2  regular; no murmurs, gallops or rubs  ABDOMEN: Soft, Nontender, Nondistended; Bowel sounds present  EXTREMITIES: no cyanosis; no edema; no calf tenderness  SKIN: warm and dry; no rash  NERVOUS SYSTEM:  Awake and alert; Oriented  to place, person and time ; no new deficits    _________________________________________________  LABS:                        11.8   6.04  )-----------( 281      ( 19 Mar 2024 05:21 )             37.2     03-19    137  |  107  |  26<H>  ----------------------------<  138<H>  5.0   |  23  |  1.36<H>    Ca    9.5      19 Mar 2024 05:21    TPro  7.4  /  Alb  3.2<L>  /  TBili  0.3  /  DBili  x   /  AST  48<H>  /  ALT  54  /  AlkPhos  64  03-18    PT/INR - ( 18 Mar 2024 21:06 )   PT: 10.2 sec;   INR: 0.89 ratio         PTT - ( 18 Mar 2024 21:06 )  PTT:32.7 sec  Urinalysis Basic - ( 19 Mar 2024 05:21 )    Color: x / Appearance: x / SG: x / pH: x  Gluc: 138 mg/dL / Ketone: x  / Bili: x / Urobili: x   Blood: x / Protein: x / Nitrite: x   Leuk Esterase: x / RBC: x / WBC x   Sq Epi: x / Non Sq Epi: x / Bacteria: x      CAPILLARY BLOOD GLUCOSE      POCT Blood Glucose.: 92 mg/dL (19 Mar 2024 12:35)  POCT Blood Glucose.: 108 mg/dL (19 Mar 2024 08:36)    RADIOLOGY & ADDITIONAL TESTS:    < from: Xray Chest 2 Views PA/Lat (03.18.24 @ 20:37) >  ACC: [id number] EXAM:  XR CHEST PA LAT 2V   ORDERED BY: SOSA MOE     PROCEDURE DATE:  03/18/2024          INTERPRETATION:  PA and lateral chest radiographs    COMPARISON: 10/10/2021 chest x-ray.    CLINICAL INFORMATION: Chest pain.    FINDINGS:  CATHETERS AND TUBES: None    PULMONARY: The airway is midline.  There are no airspace consolidations or radiographic evidence of   pulmonary nodules..  No pleural effusion or pneumothorax.    HEART/VASCULAR: The heart size and mediastinum configuration are within   the limits of normal.    BONES: The visualized osseous thorax is intact.    IMPRESSION:    No radiographic evidence of active chest disease..    --- End of Report ---    < end of copied text >      Imaging Personally Reviewed:  YES/NO    Consultant(s) Notes Reviewed:   YES/ No    Care Discussed with Consultants :     Plan of care was discussed with patient and /or primary care giver; all questions and concerns were addressed and care was aligned with patient's wishes.

## 2024-03-20 NOTE — DISCHARGE NOTE PROVIDER - HOSPITAL COURSE
69 year old woman with hypertension, hyperlipidemia, asthma, COPD, prediabetes, and glaucoma who is coming for one day of chest pain and bilateral jaw numbness and tingling. She's admitted for ACS workup. In the field she had 324 mg of aspirin and nitroglycerin x1. Of note, she developed facial edema shortly after the aspirin was administered. She received benadryl in the ER, with some resolution.    She was evaluated by cardiologist underwent, stress test and ECHO which was grossly unremarkable. Given patient's improved clinical status and current hemodynamic stability, decision was made to discharge. Discussed with attending  Please refer to patient's complete medical chart with documents for a full hospital course, for this is only a brief summary.

## 2024-03-20 NOTE — DISCHARGE NOTE NURSING/CASE MANAGEMENT/SOCIAL WORK - PATIENT PORTAL LINK FT
You can access the FollowMyHealth Patient Portal offered by [facility] by registering at the following website: [email or web address]. By joining [employer]’s FollowMyHealth portal, you will also be able to view your health information using other applications (apps) compatible with our system.

## 2024-03-20 NOTE — DISCHARGE NOTE PROVIDER - NSDCCPCAREPLAN_GEN_ALL_CORE_FT
PRINCIPAL DISCHARGE DIAGNOSIS  Diagnosis: Chest pain  Assessment and Plan of Treatment: You presented with chest pain, you were evaluated by cardiology service. You underwent stress test and ECHO, which were unremarkable.  Please follow up with your PCP.      SECONDARY DISCHARGE DIAGNOSES  Diagnosis: Allergic reaction  Assessment and Plan of Treatment: You developed an allergic reaction- lip swelling after getting     PRINCIPAL DISCHARGE DIAGNOSIS  Diagnosis: Chest pain  Assessment and Plan of Treatment: You presented with chest pain, you were evaluated by cardiology service. You underwent stress test and ECHO, which were unremarkable.  Please follow up with your PCP.      SECONDARY DISCHARGE DIAGNOSES  Diagnosis: Hypertension  Assessment and Plan of Treatment: You have history of HTN, please take losartan 25mg once daily. Hold BP medicine if BP less than 110    Diagnosis: Allergic reaction  Assessment and Plan of Treatment: You developed an allergic reaction- lip swelling after getting

## 2024-03-20 NOTE — DISCHARGE NOTE PROVIDER - NSDCMRMEDTOKEN_GEN_ALL_CORE_FT
albuterol 90 mcg/inh inhalation aerosol: 2 puff(s) inhaled every 6 hours, As needed, Shortness of Breath and/or Wheezing  aluminum hydroxide-magnesium hydroxide 200 mg-200 mg/5 mL oral suspension: 30 milliliter(s) orally every 6 hours, As needed, Dyspepsia  atorvastatin 40 mg oral tablet: 1 tab(s) orally once a day (at bedtime)  losartan 25 mg oral tablet: 1 tab(s) orally 2 times a day  Melatonin 3 mg oral tablet: 1 tab(s) orally once a day (at bedtime)  prednisoLONE acetate 1% ophthalmic suspension: 1 drop(s) to each affected eye every 6 hours  Symbicort 80 mcg-4.5 mcg/inh inhalation aerosol: 2 puff(s) inhaled 2 times a day  timolol maleate 0.25% ophthalmic solution: 1 drop(s) to each affected eye 2 times a day   albuterol 90 mcg/inh inhalation aerosol: 2 puff(s) inhaled every 6 hours, As needed, Shortness of Breath and/or Wheezing  aluminum hydroxide-magnesium hydroxide 200 mg-200 mg/5 mL oral suspension: 30 milliliter(s) orally every 6 hours, As needed, Dyspepsia  atorvastatin 40 mg oral tablet: 1 tab(s) orally once a day (at bedtime)  losartan 25 mg oral tablet: 1 tab(s) orally once a day  Melatonin 3 mg oral tablet: 1 tab(s) orally once a day (at bedtime)  prednisoLONE acetate 1% ophthalmic suspension: 1 drop(s) to each affected eye every 6 hours  Symbicort 80 mcg-4.5 mcg/inh inhalation aerosol: 2 puff(s) inhaled 2 times a day  timolol maleate 0.25% ophthalmic solution: 1 drop(s) to each affected eye 2 times a day   albuterol 90 mcg/inh inhalation aerosol: 2 puff(s) inhaled every 6 hours, As needed, Shortness of Breath and/or Wheezing  Farxiga 10 mg oral tablet: 1 tab(s) orally once a day  losartan 25 mg oral tablet: 1 tab(s) orally once a day  simvastatin 5 mg oral tablet: 1 tab(s) orally once a day  Symbicort 80 mcg-4.5 mcg/inh inhalation aerosol: 2 puff(s) inhaled 2 times a day

## 2024-03-23 ENCOUNTER — INPATIENT (INPATIENT)
Facility: HOSPITAL | Age: 69
LOS: 2 days | Discharge: HOME | DRG: 305 | End: 2024-03-26
Attending: STUDENT IN AN ORGANIZED HEALTH CARE EDUCATION/TRAINING PROGRAM | Admitting: STUDENT IN AN ORGANIZED HEALTH CARE EDUCATION/TRAINING PROGRAM
Payer: MEDICARE

## 2024-03-23 VITALS
OXYGEN SATURATION: 97 % | TEMPERATURE: 98 F | HEART RATE: 81 BPM | SYSTOLIC BLOOD PRESSURE: 195 MMHG | HEIGHT: 59 IN | WEIGHT: 106.04 LBS | DIASTOLIC BLOOD PRESSURE: 102 MMHG | RESPIRATION RATE: 16 BRPM

## 2024-03-23 DIAGNOSIS — Z29.9 ENCOUNTER FOR PROPHYLACTIC MEASURES, UNSPECIFIED: ICD-10-CM

## 2024-03-23 DIAGNOSIS — I10 ESSENTIAL (PRIMARY) HYPERTENSION: ICD-10-CM

## 2024-03-23 DIAGNOSIS — Z98.890 OTHER SPECIFIED POSTPROCEDURAL STATES: Chronic | ICD-10-CM

## 2024-03-23 DIAGNOSIS — R07.89 OTHER CHEST PAIN: ICD-10-CM

## 2024-03-23 DIAGNOSIS — I16.0 HYPERTENSIVE URGENCY: ICD-10-CM

## 2024-03-23 DIAGNOSIS — Z98.890 OTHER SPECIFIED POSTPROCEDURAL STATES: ICD-10-CM

## 2024-03-23 DIAGNOSIS — J44.9 CHRONIC OBSTRUCTIVE PULMONARY DISEASE, UNSPECIFIED: ICD-10-CM

## 2024-03-23 DIAGNOSIS — Z90.710 ACQUIRED ABSENCE OF BOTH CERVIX AND UTERUS: Chronic | ICD-10-CM

## 2024-03-23 DIAGNOSIS — H49.20 SIXTH [ABDUCENT] NERVE PALSY, UNSPECIFIED EYE: ICD-10-CM

## 2024-03-23 LAB
ALBUMIN SERPL ELPH-MCNC: 3.5 G/DL — SIGNIFICANT CHANGE UP (ref 3.5–5)
ALP SERPL-CCNC: 63 U/L — SIGNIFICANT CHANGE UP (ref 40–120)
ALT FLD-CCNC: 55 U/L DA — SIGNIFICANT CHANGE UP (ref 10–60)
ANION GAP SERPL CALC-SCNC: 10 MMOL/L — SIGNIFICANT CHANGE UP (ref 5–17)
APPEARANCE UR: CLEAR — SIGNIFICANT CHANGE UP
AST SERPL-CCNC: 38 U/L — SIGNIFICANT CHANGE UP (ref 10–40)
BASOPHILS # BLD AUTO: 0.03 K/UL — SIGNIFICANT CHANGE UP (ref 0–0.2)
BASOPHILS NFR BLD AUTO: 0.3 % — SIGNIFICANT CHANGE UP (ref 0–2)
BILIRUB SERPL-MCNC: 0.4 MG/DL — SIGNIFICANT CHANGE UP (ref 0.2–1.2)
BILIRUB UR-MCNC: NEGATIVE — SIGNIFICANT CHANGE UP
BUN SERPL-MCNC: 26 MG/DL — HIGH (ref 7–18)
CALCIUM SERPL-MCNC: 9.1 MG/DL — SIGNIFICANT CHANGE UP (ref 8.4–10.5)
CHLORIDE SERPL-SCNC: 103 MMOL/L — SIGNIFICANT CHANGE UP (ref 96–108)
CK SERPL-CCNC: 58 U/L — SIGNIFICANT CHANGE UP (ref 21–215)
CO2 SERPL-SCNC: 18 MMOL/L — LOW (ref 22–31)
COLOR SPEC: YELLOW — SIGNIFICANT CHANGE UP
CREAT SERPL-MCNC: 1.14 MG/DL — SIGNIFICANT CHANGE UP (ref 0.5–1.3)
DIFF PNL FLD: NEGATIVE — SIGNIFICANT CHANGE UP
EGFR: 53 ML/MIN/1.73M2 — LOW
EOSINOPHIL # BLD AUTO: 0.18 K/UL — SIGNIFICANT CHANGE UP (ref 0–0.5)
EOSINOPHIL NFR BLD AUTO: 2 % — SIGNIFICANT CHANGE UP (ref 0–6)
GLUCOSE SERPL-MCNC: 132 MG/DL — HIGH (ref 70–99)
GLUCOSE UR QL: 500 MG/DL
HCT VFR BLD CALC: 37.4 % — SIGNIFICANT CHANGE UP (ref 34.5–45)
HGB BLD-MCNC: 12.1 G/DL — SIGNIFICANT CHANGE UP (ref 11.5–15.5)
IMM GRANULOCYTES NFR BLD AUTO: 0.2 % — SIGNIFICANT CHANGE UP (ref 0–0.9)
KETONES UR-MCNC: NEGATIVE MG/DL — SIGNIFICANT CHANGE UP
LEUKOCYTE ESTERASE UR-ACNC: NEGATIVE — SIGNIFICANT CHANGE UP
LIDOCAIN IGE QN: 125 U/L — HIGH (ref 13–75)
LYMPHOCYTES # BLD AUTO: 1.85 K/UL — SIGNIFICANT CHANGE UP (ref 1–3.3)
LYMPHOCYTES # BLD AUTO: 20.6 % — SIGNIFICANT CHANGE UP (ref 13–44)
MAGNESIUM SERPL-MCNC: 1.9 MG/DL — SIGNIFICANT CHANGE UP (ref 1.6–2.6)
MCHC RBC-ENTMCNC: 27.9 PG — SIGNIFICANT CHANGE UP (ref 27–34)
MCHC RBC-ENTMCNC: 32.4 GM/DL — SIGNIFICANT CHANGE UP (ref 32–36)
MCV RBC AUTO: 86.2 FL — SIGNIFICANT CHANGE UP (ref 80–100)
MONOCYTES # BLD AUTO: 0.46 K/UL — SIGNIFICANT CHANGE UP (ref 0–0.9)
MONOCYTES NFR BLD AUTO: 5.1 % — SIGNIFICANT CHANGE UP (ref 2–14)
NEUTROPHILS # BLD AUTO: 6.45 K/UL — SIGNIFICANT CHANGE UP (ref 1.8–7.4)
NEUTROPHILS NFR BLD AUTO: 71.8 % — SIGNIFICANT CHANGE UP (ref 43–77)
NITRITE UR-MCNC: NEGATIVE — SIGNIFICANT CHANGE UP
NRBC # BLD: 0 /100 WBCS — SIGNIFICANT CHANGE UP (ref 0–0)
PH UR: 5 — SIGNIFICANT CHANGE UP (ref 5–8)
PLATELET # BLD AUTO: 268 K/UL — SIGNIFICANT CHANGE UP (ref 150–400)
POTASSIUM SERPL-MCNC: 4.3 MMOL/L — SIGNIFICANT CHANGE UP (ref 3.5–5.3)
POTASSIUM SERPL-SCNC: 4.3 MMOL/L — SIGNIFICANT CHANGE UP (ref 3.5–5.3)
PROT SERPL-MCNC: 8 G/DL — SIGNIFICANT CHANGE UP (ref 6–8.3)
PROT UR-MCNC: NEGATIVE MG/DL — SIGNIFICANT CHANGE UP
RBC # BLD: 4.34 M/UL — SIGNIFICANT CHANGE UP (ref 3.8–5.2)
RBC # FLD: 15.7 % — HIGH (ref 10.3–14.5)
SODIUM SERPL-SCNC: 131 MMOL/L — LOW (ref 135–145)
SP GR SPEC: 1.01 — SIGNIFICANT CHANGE UP (ref 1–1.03)
TROPONIN I, HIGH SENSITIVITY RESULT: 12.5 NG/L — SIGNIFICANT CHANGE UP
TROPONIN I, HIGH SENSITIVITY RESULT: 9.4 NG/L — SIGNIFICANT CHANGE UP
UROBILINOGEN FLD QL: 0.2 MG/DL — SIGNIFICANT CHANGE UP (ref 0.2–1)
WBC # BLD: 8.99 K/UL — SIGNIFICANT CHANGE UP (ref 3.8–10.5)
WBC # FLD AUTO: 8.99 K/UL — SIGNIFICANT CHANGE UP (ref 3.8–10.5)

## 2024-03-23 PROCEDURE — 99223 1ST HOSP IP/OBS HIGH 75: CPT | Mod: GC

## 2024-03-23 PROCEDURE — 99291 CRITICAL CARE FIRST HOUR: CPT

## 2024-03-23 PROCEDURE — 71045 X-RAY EXAM CHEST 1 VIEW: CPT | Mod: 26

## 2024-03-23 RX ORDER — ENOXAPARIN SODIUM 100 MG/ML
40 INJECTION SUBCUTANEOUS EVERY 24 HOURS
Refills: 0 | Status: DISCONTINUED | OUTPATIENT
Start: 2024-03-23 | End: 2024-03-26

## 2024-03-23 RX ORDER — ALBUTEROL 90 UG/1
2 AEROSOL, METERED ORAL EVERY 6 HOURS
Refills: 0 | Status: DISCONTINUED | OUTPATIENT
Start: 2024-03-23 | End: 2024-03-26

## 2024-03-23 RX ORDER — LOSARTAN POTASSIUM 100 MG/1
25 TABLET, FILM COATED ORAL DAILY
Refills: 0 | Status: DISCONTINUED | OUTPATIENT
Start: 2024-03-23 | End: 2024-03-24

## 2024-03-23 RX ORDER — SIMVASTATIN 20 MG/1
1 TABLET, FILM COATED ORAL
Refills: 0 | DISCHARGE

## 2024-03-23 RX ORDER — TIMOLOL 0.5 %
1 DROPS OPHTHALMIC (EYE)
Refills: 0 | Status: DISCONTINUED | OUTPATIENT
Start: 2024-03-23 | End: 2024-03-26

## 2024-03-23 RX ORDER — BUDESONIDE AND FORMOTEROL FUMARATE DIHYDRATE 160; 4.5 UG/1; UG/1
2 AEROSOL RESPIRATORY (INHALATION)
Refills: 0 | Status: DISCONTINUED | OUTPATIENT
Start: 2024-03-23 | End: 2024-03-26

## 2024-03-23 RX ORDER — LABETALOL HCL 100 MG
25 TABLET ORAL ONCE
Refills: 0 | Status: DISCONTINUED | OUTPATIENT
Start: 2024-03-23 | End: 2024-03-23

## 2024-03-23 RX ORDER — PREDNISOLONE SODIUM PHOSPHATE 1 %
0 DROPS OPHTHALMIC (EYE)
Refills: 0 | DISCHARGE

## 2024-03-23 RX ORDER — TIMOLOL 0.5 %
1 DROPS OPHTHALMIC (EYE)
Refills: 0 | DISCHARGE

## 2024-03-23 RX ORDER — PREDNISOLONE SODIUM PHOSPHATE 1 %
1 DROPS OPHTHALMIC (EYE)
Refills: 0 | Status: DISCONTINUED | OUTPATIENT
Start: 2024-03-23 | End: 2024-03-26

## 2024-03-23 RX ORDER — LABETALOL HCL 100 MG
20 TABLET ORAL ONCE
Refills: 0 | Status: COMPLETED | OUTPATIENT
Start: 2024-03-23 | End: 2024-03-23

## 2024-03-23 RX ORDER — ACETAMINOPHEN 500 MG
650 TABLET ORAL EVERY 6 HOURS
Refills: 0 | Status: DISCONTINUED | OUTPATIENT
Start: 2024-03-23 | End: 2024-03-26

## 2024-03-23 RX ORDER — DAPAGLIFLOZIN 10 MG/1
1 TABLET, FILM COATED ORAL
Refills: 0 | DISCHARGE

## 2024-03-23 RX ORDER — ONDANSETRON 8 MG/1
4 TABLET, FILM COATED ORAL EVERY 8 HOURS
Refills: 0 | Status: DISCONTINUED | OUTPATIENT
Start: 2024-03-23 | End: 2024-03-26

## 2024-03-23 RX ORDER — LANOLIN ALCOHOL/MO/W.PET/CERES
3 CREAM (GRAM) TOPICAL AT BEDTIME
Refills: 0 | Status: DISCONTINUED | OUTPATIENT
Start: 2024-03-23 | End: 2024-03-26

## 2024-03-23 RX ORDER — NIFEDIPINE 30 MG
30 TABLET, EXTENDED RELEASE 24 HR ORAL DAILY
Refills: 0 | Status: DISCONTINUED | OUTPATIENT
Start: 2024-03-23 | End: 2024-03-26

## 2024-03-23 RX ORDER — LABETALOL HCL 100 MG
5 TABLET ORAL
Refills: 0 | Status: DISCONTINUED | OUTPATIENT
Start: 2024-03-23 | End: 2024-03-26

## 2024-03-23 RX ORDER — ATORVASTATIN CALCIUM 80 MG/1
40 TABLET, FILM COATED ORAL AT BEDTIME
Refills: 0 | Status: DISCONTINUED | OUTPATIENT
Start: 2024-03-23 | End: 2024-03-26

## 2024-03-23 RX ADMIN — BUDESONIDE AND FORMOTEROL FUMARATE DIHYDRATE 2 PUFF(S): 160; 4.5 AEROSOL RESPIRATORY (INHALATION) at 22:32

## 2024-03-23 RX ADMIN — Medication 1 DROP(S): at 18:55

## 2024-03-23 RX ADMIN — Medication 3 MILLIGRAM(S): at 22:17

## 2024-03-23 RX ADMIN — Medication 1 DROP(S): at 18:42

## 2024-03-23 RX ADMIN — ATORVASTATIN CALCIUM 40 MILLIGRAM(S): 80 TABLET, FILM COATED ORAL at 22:17

## 2024-03-23 RX ADMIN — ENOXAPARIN SODIUM 40 MILLIGRAM(S): 100 INJECTION SUBCUTANEOUS at 18:42

## 2024-03-23 RX ADMIN — Medication 20 MILLIGRAM(S): at 14:05

## 2024-03-23 RX ADMIN — Medication 1 DROP(S): at 23:49

## 2024-03-23 NOTE — ED ADULT NURSE NOTE - OBJECTIVE STATEMENT
Patient presents to ED with c/o  hypertension and dizziness. Patient reports hx of hypertension and is on Losortan 25 mg. Patient presents to ED with c/o hypertension and dizziness. Patient reports hx of hypertension and is on Losartan 25 mg.  Patient reports taking 2 pills before coming to the hospital. Patient also c/o left side chest and neck heaviness.

## 2024-03-23 NOTE — ED PROVIDER NOTE - PROGRESS NOTE DETAILS
Labs/CXR/EKG explained to patient   patient's first troponin is negative, elevated lipase but with no abdominal pain, abdominal symptoms, will not pursue for CT abdomen   repeat for pressure 144/74, patient still complaining of chest pressure.  Will discuss with hospitalist Case discussed with hospitalist Dr. Bernstein, will admit pt   No aspirin to be given, patient is allergic to aspirin

## 2024-03-23 NOTE — H&P ADULT - ASSESSMENT
69F with PMH of HTN, HLD, COPD, nerve palsy, thyroidectomy, presenting with L sided chest heaviness, neck heaviness, and dizziness. Patient had symptoms Monday last week 3/18 which she presented to the ED, had chest pain workup with TTE and stress test that were negative. Has had EGD for GERD symptoms which showed some stress ulcers, and recently had bilateral surgery of the eyes for glaucoma. CBC, CMP normal, Trop negative, will admit for further workup of hypertensive urgency rule out secondary causes   69F with PMH of HTN, HLD, COPD, nerve palsy, thyroidectomy, presenting with L sided chest heaviness, neck heaviness, and dizziness. Patient had symptoms Monday last week 3/18 which she presented to the ED, had chest pain workup with TTE and stress test that were negative. Has had EGD for GERD symptoms which showed some stress ulcers, and recently had bilateral surgery of the eyes for glaucoma. CBC, CMP normal, Trop negative, will admit for further workup of hypertensive urgency rule out secondary causes Pt presents with left sided neck and head pain with varying feeling of jaw numbness and tingling; with dizziness, vision problems due to glaucoma  will obtain ESR/CRP to rule out GCA

## 2024-03-23 NOTE — ED PROVIDER NOTE - CONSTITUTIONAL MENTATION
[FreeTextEntry1] : Headaches dizziness check labs unlikely secondary to intracranial pathology both agreed to defer imaging for now.  We will check labs but likely related to poor sleep.\par Depression I had a long discussion with the patient that she might benefit from medications.  At this time patient declined.  However agreed to revisit psychiatrist and try to initiate therapy again.\par Goiter check thyroid ultrasound check TFTs\par Follow-up 2 weeks
awake/alert/oriented to person, place, time/situation

## 2024-03-23 NOTE — H&P ADULT - NSHPPHYSICALEXAM_GEN_ALL_CORE
GENERAL: NAD, well-groomed, well-developed  HEAD:  Atraumatic, Normocephalic  EYES: Cataracts present  ENMT: No tonsillar erythema, exudates, or enlargement;   NECK: Supple, normal appearance  NERVOUS SYSTEM:  Alert & Oriented X3,  Moves all extremities spontaneously  CHEST/LUNG: Lungs clear to auscultation bilaterally, No rales, rhonchi, wheezing   HEART: Regular rate and rhythm; No murmurs, rubs, or gallops  ABDOMEN: Soft, Nontender, Nondistended; Bowel sounds present  EXTREMITIES:  2+ Peripheral Pulses,  SKIN: No rashes or lesions;  Good capillary refill GENERAL: NAD, well-groomed, well-developed  HEAD:  Atraumatic, Normocephalic  EYES: Cataracts present  ENMT: No tonsillar erythema, exudates, or enlargement;   NECK: Supple, normal appearance  NERVOUS SYSTEM:  Alert & Oriented X3,  Moves all extremities spontaneously  CHEST/LUNG: Lungs clear to auscultation bilaterally, No rales, rhonchi, wheezing   HEART: Regular rate and rhythm; No murmurs, rubs, or gallops  ABDOMEN: Soft, Nontender, Nondistended; Bowel sounds present  EXTREMITIES:  No edema  SKIN: No rashes or lesions;  Good capillary refill

## 2024-03-23 NOTE — H&P ADULT - PROBLEM SELECTOR PLAN 1
p/w week long hx of high blood pressure at home and with EMS SBP >200s  patient has had to increase her Losartan to BID to help  no evidence of End Organ damage with normal Cr  Trop negative  Plan:  Rule out secondary causes of HTN  [ ] Aldosterone/Renin  [ ] Duplex to rule renal artery stenosis  [ ] TSH  [ ] Metanephrines  [ ] GCA?  [ ] Losartan 25mg daily home dose, add Nifedipine 30mg daily, and labetalol 5mg IV push for SBP >180 p/w week long hx of high blood pressure at home and with EMS SBP >200s  patient has had to increase her Losartan to BID to help  no evidence of End Organ damage with normal Cr  Trop negative  Plan:  Rule out secondary causes of HTN  [ ] Aldosterone/Renin  [ ] Duplex to rule renal artery stenosis  [ ] TSH/T3/T4  [ ] Metanephrines  [ ] Losartan 25mg daily home dose, add Nifedipine 30mg daily, and labetalol 5mg IV push for SBP >180

## 2024-03-23 NOTE — ED PROVIDER NOTE - OBJECTIVE STATEMENT
69-year-old female with history of HTN, HLD, COPD not on home O2, left 6VI nerve palsy, thyroidectomy.  Patient claims she was admitted for hypertensive urgency from 3/18 to 3/20..  She was DC home with losartan 25 mg.  Patient complaining of feeling dizzy since the night of her discharge with blood pressure 200/?.  Patient again complained of feeling dizzy this morning, neck heaviness, left side body heaviness.  Patient denies focal weakness, numbness, headache, CP.  She took 2 tablets of losartan within 2 hours today 69-year-old female with history of HTN, HLD, COPD not on home O2, left 6th nerve palsy, thyroidectomy.  Patient claims she was admitted for hypertensive urgency from 3/18 to 3/20..  She was D/C home with losartan 25 mg.  Patient complaining of feeling dizzy since the night of her discharge with blood pressure 200/?.  Patient again complained of feeling dizzy this morning, neck heaviness, left side body heaviness.  Patient denies focal weakness, numbness, headache, CP. B/P 180/?  She took 2 tablets of losartan within 2 hours today

## 2024-03-23 NOTE — ED ADULT NURSE NOTE - NSFALLRISKINTERV_ED_ALL_ED

## 2024-03-23 NOTE — H&P ADULT - ATTENDING COMMENTS
69-year-old female with history of HTN, HLD, COPD not on home O2, left 6th nerve palsy, thyroidectomy, presenting with L sided chest heaviness and dizziness. Patient started having the symptoms in the morning after she took her home medications including losartan 25mg. She checked BP and sBP was as high as 225, prompting her to present to ED.  Of note, she was admitted to [facility] for workup of chest pain on 3/18, TTE and stress test were unremarkable, was discharged on 3/20. States that the heaviness she has is similar to what she had when presented to ED on 3/18. Also endorsing belching. Last endoscopy 6/2023 per pt, positive for stress ulcer and she was on omeprazole before but currently off at home.    On exam, patient is AOx3, NAD, cardiopulmonary exams unremarkable, abdomen soft, NT/ND, extremities without edema.     Labs reviewed, CBC, BMP, LFT unremarkable. Trop negative x2  CXR reviewed, no acute pathologies noted.    Assessment and plan:   69-year-old female with history of HTN, HLD, COPD not on home O2, left 6th nerve palsy, thyroidectomy, presenting with L sided chest heaviness and dizziness. Found to have BP 190s/100s, admitted for hypertensive urgency.    # HTN urgency  # Chest heaviness/belching  - no evidence of end organ damage  - continue home losartan 25mg, add nifedipine 30mg for better BP control  - can give labetalol IVP if sBP >180   - trop negative x2, recent TTE and stress test unremarkable, ACS ruled out, no tele needed  - check TSH  - the symptoms possibly from GI tract, will try pantoprazole    # HLD  # COPD  # DM A1c 3/2024  - continue home Symbicort, simvastatin  - hold home Farxiga, start FS/SSI    # DVT ppx: lovenox

## 2024-03-23 NOTE — H&P ADULT - HISTORY OF PRESENT ILLNESS
69F with PMH of HTN, HLD, COPD, nerve palsy, thyroidectomy, presenting with L sided chest heaviness, neck heaviness, and dizziness. Patient had symptoms Monday last week 3/18 which she presented to the ED, had  69F with PMH of HTN, HLD, COPD, nerve palsy, thyroidectomy, presenting with L sided chest heaviness, neck heaviness, and dizziness. Patient had symptoms Monday last week 3/18 which she presented to the ED, had chest pain workup with TTE and stress test that were negative. Has had EGD for GERD symptoms which showed some stress ulcers, and recently had bilateral surgery of the eyes for glaucoma.    Patient denies HA, SOB, NVD    In the ED, CBC, CMP normal, Trop negative,

## 2024-03-23 NOTE — H&P ADULT - PROBLEM SELECTOR PLAN 3
Pain control Pt presents with left sided neck and head pain with varying feeling of jaw numbness and tingling; with dizziness, vision problems due to glaucoma  will obtain   [ ] ESR/CRP to rule out GCA

## 2024-03-23 NOTE — H&P ADULT - NSICDXPASTMEDICALHX_GEN_ALL_CORE_FT
PAST MEDICAL HISTORY:  Chest heaviness     COPD (chronic obstructive pulmonary disease)     HTN (hypertension)     Hypertensive urgency     S/P thyroid surgery     Sixth nerve palsy

## 2024-03-23 NOTE — ED PROVIDER NOTE - CLINICAL SUMMARY MEDICAL DECISION MAKING FREE TEXT BOX
69-year-old female with history of HTN, HLD, COPD, recently admitted for hypertensive urgency, now complaining of elevated blood pressure, neck heaviness, left-sided body heaviness.  Patient admits to compliant with her meds.  Concern for hypertensive urgency again, electrolyte imbalance, will get labs, repeat blood pressure, give meds if blood pressure continues to be elevated and reassess

## 2024-03-23 NOTE — ED ADULT NURSE REASSESSMENT NOTE - NS ED NURSE REASSESS COMMENT FT1
Received patient awake, in stretcher, breathing with ease on room air. Denies any complaints at this time. Admitted and awaiting bed. Meal tray given at request.

## 2024-03-24 LAB
ALBUMIN SERPL ELPH-MCNC: 3.2 G/DL — LOW (ref 3.5–5)
ALP SERPL-CCNC: 53 U/L — SIGNIFICANT CHANGE UP (ref 40–120)
ALT FLD-CCNC: 48 U/L DA — SIGNIFICANT CHANGE UP (ref 10–60)
ANION GAP SERPL CALC-SCNC: 9 MMOL/L — SIGNIFICANT CHANGE UP (ref 5–17)
AST SERPL-CCNC: 28 U/L — SIGNIFICANT CHANGE UP (ref 10–40)
BILIRUB DIRECT SERPL-MCNC: 0.1 MG/DL — SIGNIFICANT CHANGE UP (ref 0–0.3)
BILIRUB INDIRECT FLD-MCNC: 0.5 MG/DL — SIGNIFICANT CHANGE UP (ref 0.2–1)
BILIRUB SERPL-MCNC: 0.6 MG/DL — SIGNIFICANT CHANGE UP (ref 0.2–1.2)
BUN SERPL-MCNC: 27 MG/DL — HIGH (ref 7–18)
CALCIUM SERPL-MCNC: 9 MG/DL — SIGNIFICANT CHANGE UP (ref 8.4–10.5)
CHLORIDE SERPL-SCNC: 103 MMOL/L — SIGNIFICANT CHANGE UP (ref 96–108)
CO2 SERPL-SCNC: 23 MMOL/L — SIGNIFICANT CHANGE UP (ref 22–31)
CREAT SERPL-MCNC: 1.35 MG/DL — HIGH (ref 0.5–1.3)
CRP SERPL-MCNC: <3 MG/L — SIGNIFICANT CHANGE UP
EGFR: 43 ML/MIN/1.73M2 — LOW
ERYTHROCYTE [SEDIMENTATION RATE] IN BLOOD: 21 MM/HR — HIGH (ref 0–20)
GLUCOSE BLDC GLUCOMTR-MCNC: 109 MG/DL — HIGH (ref 70–99)
GLUCOSE SERPL-MCNC: 99 MG/DL — SIGNIFICANT CHANGE UP (ref 70–99)
HCT VFR BLD CALC: 34 % — LOW (ref 34.5–45)
HGB BLD-MCNC: 11 G/DL — LOW (ref 11.5–15.5)
MAGNESIUM SERPL-MCNC: 1.9 MG/DL — SIGNIFICANT CHANGE UP (ref 1.6–2.6)
MCHC RBC-ENTMCNC: 28.2 PG — SIGNIFICANT CHANGE UP (ref 27–34)
MCHC RBC-ENTMCNC: 32.4 GM/DL — SIGNIFICANT CHANGE UP (ref 32–36)
MCV RBC AUTO: 87.2 FL — SIGNIFICANT CHANGE UP (ref 80–100)
NRBC # BLD: 0 /100 WBCS — SIGNIFICANT CHANGE UP (ref 0–0)
PHOSPHATE SERPL-MCNC: 4.5 MG/DL — SIGNIFICANT CHANGE UP (ref 2.5–4.5)
PLATELET # BLD AUTO: 259 K/UL — SIGNIFICANT CHANGE UP (ref 150–400)
POTASSIUM SERPL-MCNC: 3.9 MMOL/L — SIGNIFICANT CHANGE UP (ref 3.5–5.3)
POTASSIUM SERPL-SCNC: 3.9 MMOL/L — SIGNIFICANT CHANGE UP (ref 3.5–5.3)
PROT SERPL-MCNC: 7.1 G/DL — SIGNIFICANT CHANGE UP (ref 6–8.3)
RBC # BLD: 3.9 M/UL — SIGNIFICANT CHANGE UP (ref 3.8–5.2)
RBC # FLD: 15.8 % — HIGH (ref 10.3–14.5)
SODIUM SERPL-SCNC: 135 MMOL/L — SIGNIFICANT CHANGE UP (ref 135–145)
T3 SERPL-MCNC: 85 NG/DL — SIGNIFICANT CHANGE UP (ref 80–200)
T4 AB SER-ACNC: 6.9 UG/DL — SIGNIFICANT CHANGE UP (ref 4.6–12)
TSH SERPL-MCNC: 4.23 UU/ML — SIGNIFICANT CHANGE UP (ref 0.34–4.82)
WBC # BLD: 6.01 K/UL — SIGNIFICANT CHANGE UP (ref 3.8–10.5)
WBC # FLD AUTO: 6.01 K/UL — SIGNIFICANT CHANGE UP (ref 3.8–10.5)

## 2024-03-24 PROCEDURE — 99232 SBSQ HOSP IP/OBS MODERATE 35: CPT | Mod: GC

## 2024-03-24 RX ORDER — GLUCAGON INJECTION, SOLUTION 0.5 MG/.1ML
1 INJECTION, SOLUTION SUBCUTANEOUS ONCE
Refills: 0 | Status: DISCONTINUED | OUTPATIENT
Start: 2024-03-24 | End: 2024-03-26

## 2024-03-24 RX ORDER — SODIUM CHLORIDE 9 MG/ML
1000 INJECTION, SOLUTION INTRAVENOUS
Refills: 0 | Status: DISCONTINUED | OUTPATIENT
Start: 2024-03-24 | End: 2024-03-26

## 2024-03-24 RX ORDER — LOPERAMIDE HCL 2 MG
2 TABLET ORAL
Refills: 0 | Status: DISCONTINUED | OUTPATIENT
Start: 2024-03-24 | End: 2024-03-25

## 2024-03-24 RX ORDER — DEXTROSE 50 % IN WATER 50 %
25 SYRINGE (ML) INTRAVENOUS ONCE
Refills: 0 | Status: DISCONTINUED | OUTPATIENT
Start: 2024-03-24 | End: 2024-03-26

## 2024-03-24 RX ORDER — DEXTROSE 50 % IN WATER 50 %
12.5 SYRINGE (ML) INTRAVENOUS ONCE
Refills: 0 | Status: DISCONTINUED | OUTPATIENT
Start: 2024-03-24 | End: 2024-03-26

## 2024-03-24 RX ORDER — INSULIN LISPRO 100/ML
VIAL (ML) SUBCUTANEOUS
Refills: 0 | Status: DISCONTINUED | OUTPATIENT
Start: 2024-03-24 | End: 2024-03-26

## 2024-03-24 RX ORDER — AZITHROMYCIN 500 MG/1
500 TABLET, FILM COATED ORAL DAILY
Refills: 0 | Status: DISCONTINUED | OUTPATIENT
Start: 2024-03-24 | End: 2024-03-25

## 2024-03-24 RX ORDER — DEXTROSE 50 % IN WATER 50 %
15 SYRINGE (ML) INTRAVENOUS ONCE
Refills: 0 | Status: DISCONTINUED | OUTPATIENT
Start: 2024-03-24 | End: 2024-03-26

## 2024-03-24 RX ADMIN — Medication 1 DROP(S): at 17:19

## 2024-03-24 RX ADMIN — Medication 1 DROP(S): at 06:17

## 2024-03-24 RX ADMIN — BUDESONIDE AND FORMOTEROL FUMARATE DIHYDRATE 2 PUFF(S): 160; 4.5 AEROSOL RESPIRATORY (INHALATION) at 22:03

## 2024-03-24 RX ADMIN — Medication 1 DROP(S): at 23:44

## 2024-03-24 RX ADMIN — Medication 3 MILLIGRAM(S): at 22:03

## 2024-03-24 RX ADMIN — Medication 1 DROP(S): at 17:10

## 2024-03-24 RX ADMIN — Medication 1 DROP(S): at 11:45

## 2024-03-24 RX ADMIN — Medication 650 MILLIGRAM(S): at 22:44

## 2024-03-24 RX ADMIN — Medication 650 MILLIGRAM(S): at 06:20

## 2024-03-24 RX ADMIN — ATORVASTATIN CALCIUM 40 MILLIGRAM(S): 80 TABLET, FILM COATED ORAL at 22:02

## 2024-03-24 RX ADMIN — Medication 650 MILLIGRAM(S): at 22:09

## 2024-03-24 RX ADMIN — BUDESONIDE AND FORMOTEROL FUMARATE DIHYDRATE 2 PUFF(S): 160; 4.5 AEROSOL RESPIRATORY (INHALATION) at 11:45

## 2024-03-24 RX ADMIN — ENOXAPARIN SODIUM 40 MILLIGRAM(S): 100 INJECTION SUBCUTANEOUS at 17:16

## 2024-03-24 RX ADMIN — Medication 1 DROP(S): at 06:16

## 2024-03-24 RX ADMIN — Medication 2 MILLIGRAM(S): at 17:10

## 2024-03-24 RX ADMIN — Medication 650 MILLIGRAM(S): at 07:24

## 2024-03-24 NOTE — PROGRESS NOTE ADULT - PROBLEM SELECTOR PLAN 3
Pt presents with left sided neck and head pain with varying feeling of jaw numbness and tingling; with dizziness, vision problems due to glaucoma  will obtain   [ ] ESR/CRP to rule out GCA Pt presents with left sided neck and head pain with varying feeling of jaw numbness and tingling; with dizziness, vision problems due to glaucoma  will obtain the following to r/o GCA   ESR: 6  f/u CRP

## 2024-03-24 NOTE — PROGRESS NOTE ADULT - SUBJECTIVE AND OBJECTIVE BOX
PGY-1 Progress Note discussed with attending    PAGER #: [[phone]] TILL 5:00 PM  PLEASE CONTACT ON CALL TEAM:  - On Call Team (Please refer to Claudia) FROM 5:00 PM - 8:30PM  - Nightfloat Team FROM 8:30 -7:30 AM    CHIEF COMPLAINT & BRIEF HOSPITAL COURSE:    INTERVAL HPI/OVERNIGHT EVENTS:   MEDICATIONS  (STANDING):  atorvastatin 40 milliGRAM(s) Oral at bedtime  budesonide  80 MICROgram(s)/formoterol 4.5 MICROgram(s) Inhaler 2 Puff(s) Inhalation two times a day  enoxaparin Injectable 40 milliGRAM(s) SubCutaneous every 24 hours  losartan 25 milliGRAM(s) Oral daily  NIFEdipine XL 30 milliGRAM(s) Oral daily  prednisoLONE acetate 1% Suspension 1 Drop(s) Both EYES four times a day  timolol 0.25% Solution 1 Drop(s) Right EYE two times a day    MEDICATIONS  (PRN):  acetaminophen     Tablet .. 650 milliGRAM(s) Oral every 6 hours PRN Temp greater or equal to 38C (100.4F), Mild Pain (1 - 3)  albuterol    90 MICROgram(s) HFA Inhaler 2 Puff(s) Inhalation every 6 hours PRN Shortness of Breath and/or Wheezing  aluminum hydroxide/magnesium hydroxide/simethicone Suspension 30 milliLiter(s) Oral every 4 hours PRN Dyspepsia  labetalol Injectable 5 milliGRAM(s) IV Push two times a day PRN Systolic blood pressure >180  melatonin 3 milliGRAM(s) Oral at bedtime PRN Insomnia  ondansetron Injectable 4 milliGRAM(s) IV Push every 8 hours PRN Nausea and/or Vomiting      REVIEW OF SYSTEMS:  CONSTITUTIONAL: No fever, weight loss, or fatigue  RESPIRATORY: No shortness of breath  CARDIOVASCULAR: No chest pain  GASTROINTESTINAL: No abdominal pain.  GENITOURINARY: No dysuria  NEUROLOGICAL: No headaches  SKIN: No itching, burning, rashes    Vital Signs Last 24 Hrs  T(C): 36.7 (24 Mar 2024 10:08), Max: 37.5 (23 Mar 2024 12:51)  T(F): 98.1 (24 Mar 2024 10:08), Max: 99.5 (23 Mar 2024 12:51)  HR: 60 (24 Mar 2024 10:08) (57 - 82)  BP: 98/63 (24 Mar 2024 10:08) (95/72 - 195/102)  BP(mean): 84 (23 Mar 2024 19:25) (84 - 89)  RR: 18 (24 Mar 2024 10:08) (12 - 18)  SpO2: 98% (24 Mar 2024 10:08) (96% - 100%)    Parameters below as of 24 Mar 2024 10:08  Patient On (Oxygen Delivery Method): room air        PHYSICAL EXAMINATION:  GENERAL: NAD, well built  HEAD:  Atraumatic, Normocephalic  EYES:  conjunctiva and sclera clear  CHEST/LUNG: Clear to auscultation. No rales, rhonchi, wheezing, or rubs  HEART: Regular rate and rhythm; No murmurs, rubs, or gallops  ABDOMEN: Soft, Nontender, Nondistended; Bowel sounds present  NERVOUS SYSTEM:  Alert & Oriented X3,    EXTREMITIES:  2+ Peripheral Pulses, No clubbing, cyanosis, or edema  SKIN: warm dry                          11.0   6.01  )-----------( 259      ( 24 Mar 2024 05:46 )             34.0     03-24    135  |  103  |  27<H>  ----------------------------<  99  3.9   |  23  |  1.35<H>    Ca    9.0      24 Mar 2024 05:46  Phos  4.5     03-24  Mg     1.9     03-24    TPro  7.1  /  Alb  3.2<L>  /  TBili  0.6  /  DBili  0.1  /  AST  28  /  ALT  48  /  AlkPhos  53  03-24    LIVER FUNCTIONS - ( 24 Mar 2024 05:46 )  Alb: 3.2 g/dL / Pro: 7.1 g/dL / ALK PHOS: 53 U/L / ALT: 48 U/L DA / AST: 28 U/L / GGT: x           CARDIAC MARKERS ( 23 Mar 2024 12:49 )  x     / x     / 58 U/L / x     / x              CAPILLARY BLOOD GLUCOSE      RADIOLOGY & ADDITIONAL TESTS:                   PGY-1 Progress Note discussed with attending    PAGER #: [[phone]] TILL 5:00 PM  PLEASE CONTACT ON CALL TEAM:  - On Call Team (Please refer to Claudia) FROM 5:00 PM - 8:30PM  - Nightfloat Team FROM 8:30 -7:30 AM      INTERVAL HPI/OVERNIGHT EVENTS: No acute events overnight.  Patient examined at bedside this AM.  Patient denies acute complaints. mild tolerable lower chest left sided chest discomfort.    MEDICATIONS  (STANDING):  atorvastatin 40 milliGRAM(s) Oral at bedtime  budesonide  80 MICROgram(s)/formoterol 4.5 MICROgram(s) Inhaler 2 Puff(s) Inhalation two times a day  enoxaparin Injectable 40 milliGRAM(s) SubCutaneous every 24 hours  losartan 25 milliGRAM(s) Oral daily  NIFEdipine XL 30 milliGRAM(s) Oral daily  prednisoLONE acetate 1% Suspension 1 Drop(s) Both EYES four times a day  timolol 0.25% Solution 1 Drop(s) Right EYE two times a day    MEDICATIONS  (PRN):  acetaminophen     Tablet .. 650 milliGRAM(s) Oral every 6 hours PRN Temp greater or equal to 38C (100.4F), Mild Pain (1 - 3)  albuterol    90 MICROgram(s) HFA Inhaler 2 Puff(s) Inhalation every 6 hours PRN Shortness of Breath and/or Wheezing  aluminum hydroxide/magnesium hydroxide/simethicone Suspension 30 milliLiter(s) Oral every 4 hours PRN Dyspepsia  labetalol Injectable 5 milliGRAM(s) IV Push two times a day PRN Systolic blood pressure >180  melatonin 3 milliGRAM(s) Oral at bedtime PRN Insomnia  ondansetron Injectable 4 milliGRAM(s) IV Push every 8 hours PRN Nausea and/or Vomiting      REVIEW OF SYSTEMS:  CONSTITUTIONAL: No fever, weight loss, or fatigue  RESPIRATORY: No shortness of breath  CARDIOVASCULAR: No chest pain  GASTROINTESTINAL: No abdominal pain.  GENITOURINARY: No dysuria  NEUROLOGICAL: No headaches  SKIN: No itching, burning, rashes    Vital Signs Last 24 Hrs  T(C): 36.7 (24 Mar 2024 10:08), Max: 37.5 (23 Mar 2024 12:51)  T(F): 98.1 (24 Mar 2024 10:08), Max: 99.5 (23 Mar 2024 12:51)  HR: 60 (24 Mar 2024 10:08) (57 - 82)  BP: 98/63 (24 Mar 2024 10:08) (95/72 - 195/102)  BP(mean): 84 (23 Mar 2024 19:25) (84 - 89)  RR: 18 (24 Mar 2024 10:08) (12 - 18)  SpO2: 98% (24 Mar 2024 10:08) (96% - 100%)    Parameters below as of 24 Mar 2024 10:08  Patient On (Oxygen Delivery Method): room air        PHYSICAL EXAMINATION:  GENERAL: NAD, well built  HEAD:  Atraumatic, Normocephalic  EYES:  conjunctiva and sclera clear  CHEST/LUNG: Clear to auscultation. No rales, rhonchi, wheezing, or rubs  no tenderness to chest wall. No signs of trauma or bruising  HEART: Regular rate and rhythm; No murmurs, rubs, or gallops  ABDOMEN: Soft, Nontender, Nondistended; Bowel sounds present  NERVOUS SYSTEM:  Alert & Oriented X3,    EXTREMITIES:  2+ Peripheral Pulses, No clubbing, cyanosis, or edema  SKIN: warm dry                          11.0   6.01  )-----------( 259      ( 24 Mar 2024 05:46 )             34.0     03-24    135  |  103  |  27<H>  ----------------------------<  99  3.9   |  23  |  1.35<H>    Ca    9.0      24 Mar 2024 05:46  Phos  4.5     03-24  Mg     1.9     03-24    TPro  7.1  /  Alb  3.2<L>  /  TBili  0.6  /  DBili  0.1  /  AST  28  /  ALT  48  /  AlkPhos  53  03-24    LIVER FUNCTIONS - ( 24 Mar 2024 05:46 )  Alb: 3.2 g/dL / Pro: 7.1 g/dL / ALK PHOS: 53 U/L / ALT: 48 U/L DA / AST: 28 U/L / GGT: x           CARDIAC MARKERS ( 23 Mar 2024 12:49 )  x     / x     / 58 U/L / x     / x              CAPILLARY BLOOD GLUCOSE      RADIOLOGY & ADDITIONAL TESTS:

## 2024-03-24 NOTE — PROGRESS NOTE ADULT - NS ATTEND AMEND GEN_ALL_CORE FT
Patient seen at bedside, states she feels good, states her chest heaviness subsided.  VS reviewed, now patient is slightly HYPOtensive.  On exam, patient is AOx3, NAD, cardiopulmonary exams unremarkable, abdomen soft, NT/ND, extremities without edema.  Labs reviewed, CBC with hgb 11.0, BMP with Cr 1.14->1.35, LFT normal. ESR 21    Assessment and plan:  69-year-old female with history of HTN, HLD, COPD not on home O2, left 6th nerve palsy, thyroidectomy, presenting with L sided chest heaviness and dizziness. Found to have BP 190s/100s, admitted for hypertensive urgency.    # HTN urgency  # Chest heaviness/belching, resolved  - BP fluctuates, now in lower side  - hold ARB given the low BP and slight Cr rise  - f/u secondary hypertension workup, PRA/PAC, serum metanephrine, random cortisol, doppler renal A    # HLD  # COPD  # DM A1c 3/2024  - continue home Symbicort, simvastatin  - hold home Farxiga, start FS/SSI    # DVT ppx: lovenox. Patient seen at bedside, states she feels good, states her chest heaviness subsided.  VS reviewed, now patient is slightly HYPOtensive.  On exam, patient is AOx3, NAD, cardiopulmonary exams unremarkable, abdomen soft, NT/ND, extremities without edema.  Labs reviewed, CBC with hgb 11.0, BMP with Cr 1.14->1.35, LFT normal. ESR 21    Assessment and plan:  69-year-old female with history of HTN, HLD, COPD not on home O2, left 6th nerve palsy, thyroidectomy, presenting with L sided chest heaviness and dizziness. Found to have BP 190s/100s, admitted for hypertensive urgency.    # HTN urgency  # Chest heaviness/belching, resolved  - BP fluctuates, now in lower side  - hold ARB given the low BP and slight Cr rise  - f/u secondary hypertension workup, PRA/PAC, serum metanephrine, random cortisol, doppler renal A    # HLD  # COPD  # DM A1c 6.0% 3/2024  - continue home Symbicort, simvastatin  - hold home Farxiga, start FS/SSI    # DVT ppx: lovenox.

## 2024-03-24 NOTE — PROGRESS NOTE ADULT - PROBLEM SELECTOR PLAN 1
p/w week long hx of high blood pressure at home and with EMS SBP >200s  patient has had to increase her Losartan to BID to help  no evidence of End Organ damage with normal Cr  Trop negative  Plan:  Rule out secondary causes of HTN  [ ] Aldosterone/Renin  [ ] Duplex to rule renal artery stenosis  [ ] TSH/T3/T4  [ ] Metanephrines  [ ] Losartan 25mg daily home dose, add Nifedipine 30mg daily, and labetalol 5mg IV push for SBP >180 p/w week long hx of high blood pressure at home and with EMS SBP >200s  patient has had to increase her Losartan to BID to help  no evidence of End Organ damage with normal Cr  Trop negative x2  pt is now soft to normotensive  held losartan  c/w Nifedipine 30mg daily  Rule out secondary causes of HTN  f/u Aldosterone/Renin  f/u Duplex to rule renal artery stenosis  TSH/T3/T4: wnl  f/u Metanephrines

## 2024-03-25 LAB
ALBUMIN SERPL ELPH-MCNC: 3.2 G/DL — LOW (ref 3.5–5)
ALP SERPL-CCNC: 52 U/L — SIGNIFICANT CHANGE UP (ref 40–120)
ALT FLD-CCNC: 45 U/L DA — SIGNIFICANT CHANGE UP (ref 10–60)
ANION GAP SERPL CALC-SCNC: 8 MMOL/L — SIGNIFICANT CHANGE UP (ref 5–17)
AST SERPL-CCNC: 21 U/L — SIGNIFICANT CHANGE UP (ref 10–40)
BILIRUB DIRECT SERPL-MCNC: 0.1 MG/DL — SIGNIFICANT CHANGE UP (ref 0–0.3)
BILIRUB INDIRECT FLD-MCNC: 0.3 MG/DL — SIGNIFICANT CHANGE UP (ref 0.2–1)
BILIRUB SERPL-MCNC: 0.4 MG/DL — SIGNIFICANT CHANGE UP (ref 0.2–1.2)
BUN SERPL-MCNC: 25 MG/DL — HIGH (ref 7–18)
CALCIUM SERPL-MCNC: 9.1 MG/DL — SIGNIFICANT CHANGE UP (ref 8.4–10.5)
CHLORIDE SERPL-SCNC: 107 MMOL/L — SIGNIFICANT CHANGE UP (ref 96–108)
CO2 SERPL-SCNC: 24 MMOL/L — SIGNIFICANT CHANGE UP (ref 22–31)
CREAT SERPL-MCNC: 1.21 MG/DL — SIGNIFICANT CHANGE UP (ref 0.5–1.3)
EGFR: 49 ML/MIN/1.73M2 — LOW
GLUCOSE BLDC GLUCOMTR-MCNC: 101 MG/DL — HIGH (ref 70–99)
GLUCOSE BLDC GLUCOMTR-MCNC: 108 MG/DL — HIGH (ref 70–99)
GLUCOSE BLDC GLUCOMTR-MCNC: 87 MG/DL — SIGNIFICANT CHANGE UP (ref 70–99)
GLUCOSE BLDC GLUCOMTR-MCNC: 97 MG/DL — SIGNIFICANT CHANGE UP (ref 70–99)
GLUCOSE SERPL-MCNC: 93 MG/DL — SIGNIFICANT CHANGE UP (ref 70–99)
HCT VFR BLD CALC: 34.6 % — SIGNIFICANT CHANGE UP (ref 34.5–45)
HGB BLD-MCNC: 10.9 G/DL — LOW (ref 11.5–15.5)
MAGNESIUM SERPL-MCNC: 2 MG/DL — SIGNIFICANT CHANGE UP (ref 1.6–2.6)
MCHC RBC-ENTMCNC: 27.5 PG — SIGNIFICANT CHANGE UP (ref 27–34)
MCHC RBC-ENTMCNC: 31.5 GM/DL — LOW (ref 32–36)
MCV RBC AUTO: 87.4 FL — SIGNIFICANT CHANGE UP (ref 80–100)
NRBC # BLD: 0 /100 WBCS — SIGNIFICANT CHANGE UP (ref 0–0)
PHOSPHATE SERPL-MCNC: 3.8 MG/DL — SIGNIFICANT CHANGE UP (ref 2.5–4.5)
PLATELET # BLD AUTO: 255 K/UL — SIGNIFICANT CHANGE UP (ref 150–400)
POTASSIUM SERPL-MCNC: 4.5 MMOL/L — SIGNIFICANT CHANGE UP (ref 3.5–5.3)
POTASSIUM SERPL-SCNC: 4.5 MMOL/L — SIGNIFICANT CHANGE UP (ref 3.5–5.3)
PROT SERPL-MCNC: 7.3 G/DL — SIGNIFICANT CHANGE UP (ref 6–8.3)
RBC # BLD: 3.96 M/UL — SIGNIFICANT CHANGE UP (ref 3.8–5.2)
RBC # FLD: 15.8 % — HIGH (ref 10.3–14.5)
SODIUM SERPL-SCNC: 139 MMOL/L — SIGNIFICANT CHANGE UP (ref 135–145)
WBC # BLD: 4.88 K/UL — SIGNIFICANT CHANGE UP (ref 3.8–10.5)
WBC # FLD AUTO: 4.88 K/UL — SIGNIFICANT CHANGE UP (ref 3.8–10.5)

## 2024-03-25 PROCEDURE — 99232 SBSQ HOSP IP/OBS MODERATE 35: CPT

## 2024-03-25 RX ORDER — GABAPENTIN 400 MG/1
100 CAPSULE ORAL
Refills: 0 | Status: DISCONTINUED | OUTPATIENT
Start: 2024-03-25 | End: 2024-03-26

## 2024-03-25 RX ADMIN — BUDESONIDE AND FORMOTEROL FUMARATE DIHYDRATE 2 PUFF(S): 160; 4.5 AEROSOL RESPIRATORY (INHALATION) at 12:22

## 2024-03-25 RX ADMIN — Medication 1 DROP(S): at 05:28

## 2024-03-25 RX ADMIN — Medication 1 DROP(S): at 18:28

## 2024-03-25 RX ADMIN — ATORVASTATIN CALCIUM 40 MILLIGRAM(S): 80 TABLET, FILM COATED ORAL at 22:23

## 2024-03-25 RX ADMIN — ENOXAPARIN SODIUM 40 MILLIGRAM(S): 100 INJECTION SUBCUTANEOUS at 18:28

## 2024-03-25 RX ADMIN — Medication 30 MILLIGRAM(S): at 05:27

## 2024-03-25 RX ADMIN — BUDESONIDE AND FORMOTEROL FUMARATE DIHYDRATE 2 PUFF(S): 160; 4.5 AEROSOL RESPIRATORY (INHALATION) at 22:23

## 2024-03-25 RX ADMIN — GABAPENTIN 100 MILLIGRAM(S): 400 CAPSULE ORAL at 18:33

## 2024-03-25 NOTE — PROGRESS NOTE ADULT - PROBLEM SELECTOR PLAN 1
p/w week long hx of high blood pressure at home and with EMS SBP >200s  patient has had to increase her Losartan to BID to help  no evidence of End Organ damage with normal Cr  Trop negative x2  pt is now soft to normotensive  held losartan  c/w Nifedipine 30mg daily  Rule out secondary causes of HTN  f/u Aldosterone/Renin  f/u Duplex to rule renal artery stenosis: to be done outpt  TSH/T3/T4: wnl  f/u Metanephrines

## 2024-03-25 NOTE — PROGRESS NOTE ADULT - ASSESSMENT
69F with PMH of HTN, HLD, COPD, nerve palsy, thyroidectomy, presenting with L sided chest heaviness, neck heaviness, and dizziness. Patient had symptoms Monday last week 3/18 which she presented to the ED, had chest pain workup with TTE and stress test that were negative. Has had EGD for GERD symptoms which showed some stress ulcers, and recently had bilateral surgery of the eyes for glaucoma. CBC, CMP normal, Trop negative, will admit for further workup of hypertensive urgency rule out secondary causes Pt presents with left sided neck and head pain with varying feeling of jaw numbness and tingling; with dizziness, vision problems due to glaucoma  will obtain ESR/CRP to rule out GCA  
69F with PMH of HTN, HLD, COPD, nerve palsy, thyroidectomy, presenting with L sided chest heaviness, neck heaviness, and dizziness. Patient had symptoms Monday last week 3/18 which she presented to the ED, had chest pain workup with TTE and stress test that were negative. Has had EGD for GERD symptoms which showed some stress ulcers, and recently had bilateral surgery of the eyes for glaucoma. CBC, CMP normal, Trop negative, will admit for further workup of hypertensive urgency rule out secondary causes Pt presents with left sided neck and head pain with varying feeling of jaw numbness and tingling; with dizziness, vision problems due to glaucoma

## 2024-03-25 NOTE — PROGRESS NOTE ADULT - PROBLEM SELECTOR PLAN 3
Pt presents with left sided neck and head pain with varying feeling of jaw numbness and tingling; with dizziness, vision problems due to glaucoma  will obtain the following to r/o GCA   ESR: 6, CRP <3

## 2024-03-25 NOTE — PROGRESS NOTE ADULT - SUBJECTIVE AND OBJECTIVE BOX
PGY-1 Progress Note discussed with attending    PAGER #: [] TILL 5:00 PM  PLEASE CONTACT ON CALL TEAM:  - On Call Team (Please refer to Claudia) FROM 5:00 PM - 8:30PM  - Nightfloat Team FROM 8:30 -7:30 AM    CHIEF COMPLAINT & BRIEF HOSPITAL COURSE:      INTERVAL HPI/OVERNIGHT EVENTS:       REVIEW OF SYSTEMS:  CONSTITUTIONAL: No fever, weight loss, or fatigue  RESPIRATORY: No cough, wheezing, chills or hemoptysis; No shortness of breath  CARDIOVASCULAR: No chest pain, palpitations, dizziness, or leg swelling  GASTROINTESTINAL: No abdominal pain. No nausea, vomiting, or hematemesis; No diarrhea or constipation. No melena or hematochezia.  GENITOURINARY: No dysuria or hematuria, urinary frequency  NEUROLOGICAL: No headaches, memory loss, loss of strength, numbness, or tremors  SKIN: No itching, burning, rashes, or lesions     MEDICATIONS  (STANDING):  atorvastatin 40 milliGRAM(s) Oral at bedtime  budesonide  80 MICROgram(s)/formoterol 4.5 MICROgram(s) Inhaler 2 Puff(s) Inhalation two times a day  dextrose 5%. 1000 milliLiter(s) (50 mL/Hr) IV Continuous <Continuous>  dextrose 5%. 1000 milliLiter(s) (100 mL/Hr) IV Continuous <Continuous>  dextrose 50% Injectable 25 Gram(s) IV Push once  dextrose 50% Injectable 12.5 Gram(s) IV Push once  dextrose 50% Injectable 25 Gram(s) IV Push once  enoxaparin Injectable 40 milliGRAM(s) SubCutaneous every 24 hours  glucagon  Injectable 1 milliGRAM(s) IntraMuscular once  insulin lispro (ADMELOG) corrective regimen sliding scale   SubCutaneous three times a day before meals  NIFEdipine XL 30 milliGRAM(s) Oral daily  prednisoLONE acetate 1% Suspension 1 Drop(s) Both EYES four times a day  timolol 0.25% Solution 1 Drop(s) Right EYE two times a day    MEDICATIONS  (PRN):  acetaminophen     Tablet .. 650 milliGRAM(s) Oral every 6 hours PRN Temp greater or equal to 38C (100.4F), Mild Pain (1 - 3)  albuterol    90 MICROgram(s) HFA Inhaler 2 Puff(s) Inhalation every 6 hours PRN Shortness of Breath and/or Wheezing  aluminum hydroxide/magnesium hydroxide/simethicone Suspension 30 milliLiter(s) Oral every 4 hours PRN Dyspepsia  dextrose Oral Gel 15 Gram(s) Oral once PRN Blood Glucose LESS THAN 70 milliGRAM(s)/deciliter  labetalol Injectable 5 milliGRAM(s) IV Push two times a day PRN Systolic blood pressure >180  melatonin 3 milliGRAM(s) Oral at bedtime PRN Insomnia  ondansetron Injectable 4 milliGRAM(s) IV Push every 8 hours PRN Nausea and/or Vomiting      Vital Signs Last 24 Hrs  T(C): 36.4 (25 Mar 2024 10:51), Max: 37.3 (24 Mar 2024 21:24)  T(F): 97.5 (25 Mar 2024 10:51), Max: 99.1 (24 Mar 2024 21:24)  HR: 71 (25 Mar 2024 10:51) (57 - 71)  BP: 124/69 (25 Mar 2024 10:51) (97/62 - 124/69)  BP(mean): --  RR: 18 (25 Mar 2024 10:51) (18 - 18)  SpO2: 98% (25 Mar 2024 10:51) (97% - 98%)    Parameters below as of 25 Mar 2024 10:51  Patient On (Oxygen Delivery Method): room air        PHYSICAL EXAMINATION:  GENERAL: NAD, well built  HEAD:  Atraumatic, Normocephalic  EYES:  conjunctiva and sclera clear  NECK: Supple, No JVD, Normal thyroid  CHEST/LUNG: Clear to auscultation. Clear to percussion bilaterally; No rales, rhonchi, wheezing, or rubs  HEART: Regular rate and rhythm; No murmurs, rubs, or gallops  ABDOMEN: Soft, Nontender, Nondistended; Bowel sounds present, no pain or masses on palpation  NERVOUS SYSTEM:  Alert & Oriented X3  : voiding well  EXTREMITIES:  2+ Peripheral Pulses, No clubbing, cyanosis, or edema  SKIN: warm dry                          10.9   4.88  )-----------( 255      ( 25 Mar 2024 05:16 )             34.6     03-25    139  |  107  |  25<H>  ----------------------------<  93  4.5   |  24  |  1.21    Ca    9.1      25 Mar 2024 05:16  Phos  3.8     03-25  Mg     2.0     03-25    TPro  7.3  /  Alb  3.2<L>  /  TBili  0.4  /  DBili  0.1  /  AST  21  /  ALT  45  /  AlkPhos  52  03-25    LIVER FUNCTIONS - ( 25 Mar 2024 05:16 )  Alb: 3.2 g/dL / Pro: 7.3 g/dL / ALK PHOS: 52 U/L / ALT: 45 U/L DA / AST: 21 U/L / GGT: x           CARDIAC MARKERS ( 23 Mar 2024 12:49 )  x     / x     / 58 U/L / x     / x              I&O's Summary          CAPILLARY BLOOD GLUCOSE      RADIOLOGY & ADDITIONAL TESTS:                   PGY-1 Progress Note discussed with attending    PAGER #: [] TILL 5:00 PM  PLEASE CONTACT ON CALL TEAM:  - On Call Team (Please refer to Claudia) FROM 5:00 PM - 8:30PM  - Nightfloat Team FROM 8:30 -7:30 AM      INTERVAL HPI/OVERNIGHT EVENTS: Pt continues to complain of tingling pain along her neck. Chest discomfort from yesterday has resolved.       REVIEW OF SYSTEMS:  CONSTITUTIONAL: No fever, weight loss, or fatigue  RESPIRATORY: No cough, wheezing, chills or hemoptysis; No shortness of breath  CARDIOVASCULAR: No chest pain, palpitations, dizziness, or leg swelling  GASTROINTESTINAL: No abdominal pain. No nausea, vomiting, or hematemesis; No diarrhea or constipation. No melena or hematochezia.  GENITOURINARY: No dysuria or hematuria, urinary frequency  NEUROLOGICAL: No headaches, memory loss, loss of strength, numbness, or tremors  SKIN: No itching, burning, rashes, or lesions     MEDICATIONS  (STANDING):  atorvastatin 40 milliGRAM(s) Oral at bedtime  budesonide  80 MICROgram(s)/formoterol 4.5 MICROgram(s) Inhaler 2 Puff(s) Inhalation two times a day  dextrose 5%. 1000 milliLiter(s) (50 mL/Hr) IV Continuous <Continuous>  dextrose 5%. 1000 milliLiter(s) (100 mL/Hr) IV Continuous <Continuous>  dextrose 50% Injectable 25 Gram(s) IV Push once  dextrose 50% Injectable 12.5 Gram(s) IV Push once  dextrose 50% Injectable 25 Gram(s) IV Push once  enoxaparin Injectable 40 milliGRAM(s) SubCutaneous every 24 hours  glucagon  Injectable 1 milliGRAM(s) IntraMuscular once  insulin lispro (ADMELOG) corrective regimen sliding scale   SubCutaneous three times a day before meals  NIFEdipine XL 30 milliGRAM(s) Oral daily  prednisoLONE acetate 1% Suspension 1 Drop(s) Both EYES four times a day  timolol 0.25% Solution 1 Drop(s) Right EYE two times a day    MEDICATIONS  (PRN):  acetaminophen     Tablet .. 650 milliGRAM(s) Oral every 6 hours PRN Temp greater or equal to 38C (100.4F), Mild Pain (1 - 3)  albuterol    90 MICROgram(s) HFA Inhaler 2 Puff(s) Inhalation every 6 hours PRN Shortness of Breath and/or Wheezing  aluminum hydroxide/magnesium hydroxide/simethicone Suspension 30 milliLiter(s) Oral every 4 hours PRN Dyspepsia  dextrose Oral Gel 15 Gram(s) Oral once PRN Blood Glucose LESS THAN 70 milliGRAM(s)/deciliter  labetalol Injectable 5 milliGRAM(s) IV Push two times a day PRN Systolic blood pressure >180  melatonin 3 milliGRAM(s) Oral at bedtime PRN Insomnia  ondansetron Injectable 4 milliGRAM(s) IV Push every 8 hours PRN Nausea and/or Vomiting      Vital Signs Last 24 Hrs  T(C): 36.4 (25 Mar 2024 10:51), Max: 37.3 (24 Mar 2024 21:24)  T(F): 97.5 (25 Mar 2024 10:51), Max: 99.1 (24 Mar 2024 21:24)  HR: 71 (25 Mar 2024 10:51) (57 - 71)  BP: 124/69 (25 Mar 2024 10:51) (97/62 - 124/69)  BP(mean): --  RR: 18 (25 Mar 2024 10:51) (18 - 18)  SpO2: 98% (25 Mar 2024 10:51) (97% - 98%)    Parameters below as of 25 Mar 2024 10:51  Patient On (Oxygen Delivery Method): room air      PHYSICAL EXAMINATION:  GENERAL: NAD, well built  HEAD:  Atraumatic, Normocephalic  EYES:  conjunctiva and sclera clear  CHEST/LUNG: Clear to auscultation. No rales, rhonchi, wheezing, or rubs  no tenderness to chest wall. No signs of trauma or bruising  HEART: Regular rate and rhythm; No murmurs, rubs, or gallops  ABDOMEN: Soft, Nontender, Nondistended; Bowel sounds present  NERVOUS SYSTEM:  Alert & Oriented X3,    EXTREMITIES:  2+ Peripheral Pulses, No clubbing, cyanosis, or edema  SKIN: warm dry                          10.9   4.88  )-----------( 255      ( 25 Mar 2024 05:16 )             34.6     03-25    139  |  107  |  25<H>  ----------------------------<  93  4.5   |  24  |  1.21    Ca    9.1      25 Mar 2024 05:16  Phos  3.8     03-25  Mg     2.0     03-25    TPro  7.3  /  Alb  3.2<L>  /  TBili  0.4  /  DBili  0.1  /  AST  21  /  ALT  45  /  AlkPhos  52  03-25    LIVER FUNCTIONS - ( 25 Mar 2024 05:16 )  Alb: 3.2 g/dL / Pro: 7.3 g/dL / ALK PHOS: 52 U/L / ALT: 45 U/L DA / AST: 21 U/L / GGT: x           CARDIAC MARKERS ( 23 Mar 2024 12:49 )  x     / x     / 58 U/L / x     / x              I&O's Summary          CAPILLARY BLOOD GLUCOSE      RADIOLOGY & ADDITIONAL TESTS:

## 2024-03-25 NOTE — PROGRESS NOTE ADULT - ATTENDING COMMENTS
Patient seen at bedside, states she feels good other than mild neck pain.  On exam, patient is AOx3, NAD, cardiopulmonary exams unremarkable, abdomen soft, NT/ND, extremities without edema.  Labs reviewed, CBC normal, BMP with improving Cr, LFT without significant change.    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 Found to have BP 190s/100s, admitted for hypertensive urgency.    # HTN urgency  # Chest heaviness/belching, resolved  # Neck pain  - BP fluctuates, now in lower side on nifedipine 30mg  - holding home ARB  - f/u secondary hypertension workup, PRA/PAC, serum metanephrine, random cortisol  - it turns out that doppler renal A is not feasible in house, will defer to outpatient if the workup above is all negative  - neck pain seems from MSK etiology, will try gabapentin 100mg BID, monitor for possible dizziness/somnolence    # HLD  # COPD  # DM A1c 6.0% 3/2024  - continue home Symbicort, simvastatin  - holding home Farxiga, FS/SSI, seems stable    # DVT ppx: lovenox.

## 2024-03-26 VITALS
RESPIRATION RATE: 18 BRPM | OXYGEN SATURATION: 99 % | SYSTOLIC BLOOD PRESSURE: 125 MMHG | DIASTOLIC BLOOD PRESSURE: 62 MMHG | TEMPERATURE: 98 F | HEART RATE: 80 BPM

## 2024-03-26 LAB
ALDOST SERPL-MCNC: 31.6 NG/DL — HIGH
ANION GAP SERPL CALC-SCNC: 8 MMOL/L — SIGNIFICANT CHANGE UP (ref 5–17)
BUN SERPL-MCNC: 24 MG/DL — HIGH (ref 7–18)
CALCIUM SERPL-MCNC: 8.8 MG/DL — SIGNIFICANT CHANGE UP (ref 8.4–10.5)
CHLORIDE SERPL-SCNC: 109 MMOL/L — HIGH (ref 96–108)
CO2 SERPL-SCNC: 21 MMOL/L — LOW (ref 22–31)
CREAT SERPL-MCNC: 1.06 MG/DL — SIGNIFICANT CHANGE UP (ref 0.5–1.3)
EGFR: 58 ML/MIN/1.73M2 — LOW
GLUCOSE BLDC GLUCOMTR-MCNC: 107 MG/DL — HIGH (ref 70–99)
GLUCOSE BLDC GLUCOMTR-MCNC: 87 MG/DL — SIGNIFICANT CHANGE UP (ref 70–99)
GLUCOSE BLDC GLUCOMTR-MCNC: 92 MG/DL — SIGNIFICANT CHANGE UP (ref 70–99)
GLUCOSE SERPL-MCNC: 100 MG/DL — HIGH (ref 70–99)
HCT VFR BLD CALC: 33.3 % — LOW (ref 34.5–45)
HGB BLD-MCNC: 10.6 G/DL — LOW (ref 11.5–15.5)
MCHC RBC-ENTMCNC: 28 PG — SIGNIFICANT CHANGE UP (ref 27–34)
MCHC RBC-ENTMCNC: 31.8 GM/DL — LOW (ref 32–36)
MCV RBC AUTO: 87.9 FL — SIGNIFICANT CHANGE UP (ref 80–100)
NRBC # BLD: 0 /100 WBCS — SIGNIFICANT CHANGE UP (ref 0–0)
PLATELET # BLD AUTO: 252 K/UL — SIGNIFICANT CHANGE UP (ref 150–400)
POTASSIUM SERPL-MCNC: 3.9 MMOL/L — SIGNIFICANT CHANGE UP (ref 3.5–5.3)
POTASSIUM SERPL-SCNC: 3.9 MMOL/L — SIGNIFICANT CHANGE UP (ref 3.5–5.3)
RBC # BLD: 3.79 M/UL — LOW (ref 3.8–5.2)
RBC # FLD: 15.9 % — HIGH (ref 10.3–14.5)
SODIUM SERPL-SCNC: 138 MMOL/L — SIGNIFICANT CHANGE UP (ref 135–145)
WBC # BLD: 5.29 K/UL — SIGNIFICANT CHANGE UP (ref 3.8–10.5)
WBC # FLD AUTO: 5.29 K/UL — SIGNIFICANT CHANGE UP (ref 3.8–10.5)

## 2024-03-26 PROCEDURE — 85025 COMPLETE CBC W/AUTO DIFF WBC: CPT

## 2024-03-26 PROCEDURE — 81003 URINALYSIS AUTO W/O SCOPE: CPT

## 2024-03-26 PROCEDURE — 96374 THER/PROPH/DIAG INJ IV PUSH: CPT

## 2024-03-26 PROCEDURE — 83735 ASSAY OF MAGNESIUM: CPT

## 2024-03-26 PROCEDURE — 80053 COMPREHEN METABOLIC PANEL: CPT

## 2024-03-26 PROCEDURE — 82962 GLUCOSE BLOOD TEST: CPT

## 2024-03-26 PROCEDURE — 85652 RBC SED RATE AUTOMATED: CPT

## 2024-03-26 PROCEDURE — 84443 ASSAY THYROID STIM HORMONE: CPT

## 2024-03-26 PROCEDURE — 71045 X-RAY EXAM CHEST 1 VIEW: CPT

## 2024-03-26 PROCEDURE — 80076 HEPATIC FUNCTION PANEL: CPT

## 2024-03-26 PROCEDURE — 82088 ASSAY OF ALDOSTERONE: CPT

## 2024-03-26 PROCEDURE — 83835 ASSAY OF METANEPHRINES: CPT

## 2024-03-26 PROCEDURE — 82550 ASSAY OF CK (CPK): CPT

## 2024-03-26 PROCEDURE — 83690 ASSAY OF LIPASE: CPT

## 2024-03-26 PROCEDURE — 84484 ASSAY OF TROPONIN QUANT: CPT

## 2024-03-26 PROCEDURE — 99291 CRITICAL CARE FIRST HOUR: CPT | Mod: 25

## 2024-03-26 PROCEDURE — 80048 BASIC METABOLIC PNL TOTAL CA: CPT

## 2024-03-26 PROCEDURE — 84244 ASSAY OF RENIN: CPT

## 2024-03-26 PROCEDURE — 84480 ASSAY TRIIODOTHYRONINE (T3): CPT

## 2024-03-26 PROCEDURE — 94640 AIRWAY INHALATION TREATMENT: CPT

## 2024-03-26 PROCEDURE — 85027 COMPLETE CBC AUTOMATED: CPT

## 2024-03-26 PROCEDURE — 84436 ASSAY OF TOTAL THYROXINE: CPT

## 2024-03-26 PROCEDURE — 82533 TOTAL CORTISOL: CPT

## 2024-03-26 PROCEDURE — 86140 C-REACTIVE PROTEIN: CPT

## 2024-03-26 PROCEDURE — 93005 ELECTROCARDIOGRAM TRACING: CPT

## 2024-03-26 PROCEDURE — 99239 HOSP IP/OBS DSCHRG MGMT >30: CPT

## 2024-03-26 PROCEDURE — 84100 ASSAY OF PHOSPHORUS: CPT

## 2024-03-26 PROCEDURE — 36415 COLL VENOUS BLD VENIPUNCTURE: CPT

## 2024-03-26 RX ORDER — BUDESONIDE AND FORMOTEROL FUMARATE DIHYDRATE 160; 4.5 UG/1; UG/1
2 AEROSOL RESPIRATORY (INHALATION)
Qty: 2 | Refills: 0
Start: 2024-03-26 | End: 2024-04-24

## 2024-03-26 RX ORDER — GABAPENTIN 400 MG/1
1 CAPSULE ORAL
Qty: 20 | Refills: 0
Start: 2024-03-26 | End: 2024-04-04

## 2024-03-26 RX ORDER — ALBUTEROL 90 UG/1
2 AEROSOL, METERED ORAL
Qty: 2 | Refills: 0
Start: 2024-03-26 | End: 2024-04-24

## 2024-03-26 RX ORDER — ALBUTEROL 90 UG/1
2 AEROSOL, METERED ORAL
Qty: 1 | Refills: 0
Start: 2024-03-26 | End: 2024-04-24

## 2024-03-26 RX ORDER — NIFEDIPINE 30 MG
1 TABLET, EXTENDED RELEASE 24 HR ORAL
Qty: 30 | Refills: 0
Start: 2024-03-26 | End: 2024-04-24

## 2024-03-26 RX ADMIN — Medication 1 DROP(S): at 19:54

## 2024-03-26 RX ADMIN — Medication 1 DROP(S): at 05:55

## 2024-03-26 RX ADMIN — Medication 1 DROP(S): at 12:34

## 2024-03-26 RX ADMIN — GABAPENTIN 100 MILLIGRAM(S): 400 CAPSULE ORAL at 19:52

## 2024-03-26 RX ADMIN — GABAPENTIN 100 MILLIGRAM(S): 400 CAPSULE ORAL at 05:54

## 2024-03-26 RX ADMIN — Medication 1 DROP(S): at 19:55

## 2024-03-26 RX ADMIN — BUDESONIDE AND FORMOTEROL FUMARATE DIHYDRATE 2 PUFF(S): 160; 4.5 AEROSOL RESPIRATORY (INHALATION) at 10:38

## 2024-03-26 RX ADMIN — Medication 30 MILLIGRAM(S): at 05:54

## 2024-03-26 RX ADMIN — Medication 1 DROP(S): at 05:54

## 2024-03-26 RX ADMIN — Medication 1 DROP(S): at 00:28

## 2024-03-26 NOTE — DISCHARGE NOTE NURSING/CASE MANAGEMENT/SOCIAL WORK - PATIENT PORTAL LINK FT
You can access the FollowMyHealth Patient Portal offered by [facility] by registering at the following website: [email or web address]. By joining [facility]’s FollowMyHealth portal, you will also be able to view your health information using other applications (apps) compatible with our system.

## 2024-03-26 NOTE — DISCHARGE NOTE PROVIDER - NSDCCPCAREPLAN_GEN_ALL_CORE_FT
PRINCIPAL DISCHARGE DIAGNOSIS  Diagnosis: Hypertensive urgency  Assessment and Plan of Treatment: You have previously been diagnosed with hypertension (high blood pressure). You came to the hospital with elevated blood pressure with your systolic blood pressure in the 200s. Your home medication Losartan was held. We managed your blood pressure during your hospitalization and the cardiologist made some changes to your home medications. We are discharging you on Nifedipine 30 mg daily.  Please take your medications on time and as prescribed. In the hospital a secondary hypertension workup was also sent out with pending results for PRA/PAC, serum metanephrine, random cortisol. If required, you may also need a renal artery doppler study as part of this work up outpatient. Please follow up with your primary care physician within 1-2 weeks after discharge and routinely after for further care.   Monitor your blood pressure at home, keep a log of your home readings and follow up with your Primary Care Physician. As per AHA/ACC guidelines, it is important to adhere to a DASH Diet of fresh fruits, vegetables, lean meats such as poultry and fish, and whole wheat carbs. 30 minutes of aerobic exercise per day 3-4 times a week, reducing salt intake <1.5g/day, and cutting down on highly processed foods are also shown to reduce high blood pressure. Uncontrolled BP may result in organ damage to your eyes, brain, heart, and kidneys by causing strokes, heart attacks, kidney failure, and bleeds in your eye.      SECONDARY DISCHARGE DIAGNOSES  Diagnosis: Neuropathic pain  Assessment and Plan of Treatment: You have a history of neck pain and tingling on the right side. In the hospital, your pain improved with better control of your blood pressure and with the medication Gabapentin 100 mg twice daily. You may continue taking this medication if your neck pain does not improve. Additionally, after discharge please follow up with your primary care provider in the next 1-2 weeks for further management of this condition.    Diagnosis: COPD (chronic obstructive pulmonary disease)  Assessment and Plan of Treatment: You have a history of chronic obstructive pulmonary disease. Please continue using your home medication Symbicort as prescribed and please follow up with your primary care provider in the next 1-2 weeks for further management of this condition.    Diagnosis: Diabetes mellitus  Assessment and Plan of Treatment: You have been diagnosed with diabetes mellitus and your HbA1c at the hospital was 6. We managed you on sliding scale insulin while you were hospitalized with good glycemic control. Continue with your blood sugar medication Farxiga. Please check your blood sugar levels twice daily - Morning/Afternoon, and Lunch/Bedtime the following day. It is important to keep a record of your blood sugar readings. We recommend you maintain a healthy diet that is low in sugar, carbohydrates and fat. Please limit your intake of high sugar and high carbohydrate foods such as pasta, rice, and bread. Exercise frequently as tolerated. Please follow up with you primary care physician within one week of your discharge to further manage your diabetes and monitor your Hemoglobin A1c levels after 3 months to evaluate your diabetes control.      Diagnosis: Hyperlipidemia  Assessment and Plan of Treatment: You have previously been diagnosed with high cholesterol. We continued your home medications during your hospitalization. Please continue to take your medications as prescribed and follow up with your primary doctor within 1 week of discharge and routinely after for further management.

## 2024-03-26 NOTE — DISCHARGE NOTE PROVIDER - CARE PROVIDER_API CALL
Margaret Chaudhary  Internal Medicine  [address] [place] [id number]  Phone: [phone]  Fax: [phone]  Follow Up Time:

## 2024-03-26 NOTE — DISCHARGE NOTE PROVIDER - NSDCMRMEDTOKEN_GEN_ALL_CORE_FT
albuterol 90 mcg/inh inhalation aerosol: 2 puff(s) inhaled every 6 hours, As needed, Shortness of Breath and/or Wheezing  Farxiga 10 mg oral tablet: 1 tab(s) orally once a day  losartan 25 mg oral tablet: 1 tab(s) orally once a day  prednisoLONE acetate 0.125% ophthalmic suspension: in each affected eye 4 times a day  simvastatin 5 mg oral tablet: 1 tab(s) orally once a day  Symbicort 80 mcg-4.5 mcg/inh inhalation aerosol: 2 puff(s) inhaled 2 times a day  timolol hemihydrate 0.25% ophthalmic solution: 1 drop(s) in the right eye 2 times a day   albuterol 90 mcg/inh inhalation aerosol: 2 puff(s) inhaled every 6 hours, As needed, Shortness of Breath and/or Wheezing  Farxiga 10 mg oral tablet: 1 tab(s) orally once a day  gabapentin 100 mg oral capsule: 1 cap(s) orally 2 times a day  NIFEdipine 30 mg oral tablet, extended release: 1 tab(s) orally once a day  prednisoLONE acetate 0.125% ophthalmic suspension: in each affected eye 4 times a day  simvastatin 5 mg oral tablet: 1 tab(s) orally once a day  Symbicort 80 mcg-4.5 mcg/inh inhalation aerosol: 2 puff(s) inhaled 2 times a day  timolol hemihydrate 0.25% ophthalmic solution: 1 drop(s) in the right eye 2 times a day

## 2024-03-26 NOTE — DISCHARGE NOTE PROVIDER - HOSPITAL COURSE
69F with PMH of HTN, HLD, COPD, nerve palsy, thyroidectomy, presenting with L sided chest heaviness, neck heaviness, and dizziness. Patient had symptoms Monday last week 3/18 which she presented to the ED, had chest pain workup with TTE and stress test that were negative. Has had EGD for GERD symptoms which showed some stress ulcers, and recently had bilateral surgery of the eyes for glaucoma. CBC, CMP normal, Trop negative, will admit for further workup of hypertensive urgency rule out secondary causes Pt presents with left sided neck and head pain with varying feeling of jaw numbness and tingling; with dizziness, vision problems due to glaucoma           Problem/Plan - 1:  ·  Problem: Hypertensive urgency.   ·  Plan: p/w week long hx of high blood pressure at home and with EMS SBP >200s  patient has had to increase her Losartan to BID to help  no evidence of End Organ damage with normal Cr  Trop negative x2  pt is now soft to normotensive  held losartan  c/w Nifedipine 30mg daily  Rule out secondary causes of HTN  f/u Aldosterone/Renin  f/u Duplex to rule renal artery stenosis: to be done outpt  TSH/T3/T4: wnl  f/u Metanephrines.     Problem/Plan - 2:  ·  Problem: Chest heaviness.   ·  Plan: present on admission.  Resolved.     Problem/Plan - 3:  ·  Problem: Sixth nerve palsy.   ·  Plan: Pt presents with left sided neck and head pain with varying feeling of jaw numbness and tingling; with dizziness, vision problems due to glaucoma  will obtain the following to r/o GCA   ESR: 6, CRP <3.     Problem/Plan - 4:  ·  Problem: COPD (chronic obstructive pulmonary disease).   ·  Plan: Cont Symbicort Albuterol.     Problem/Plan - 5:  ·  Problem: HTN (hypertension).   ·  Plan: As above.     Problem/Plan - 6:  ·  Problem: S/P thyroid surgery.   ·  Plan: As above.     Problem/Plan - 7:  ·  Problem: Prophylactic measure.   ·  Plan: Lovenox. Pt is a 69F with PMH of HTN, HLD, COPD, nerve palsy, thyroidectomy, presenting with L sided chest heaviness, neck heaviness, and dizziness. Patient had symptoms Monday last week 3/18 which she presented to the ED, had chest pain workup with TTE and stress test that were negative. Has had EGD for GERD symptoms which showed some stress ulcers, and recently had bilateral surgery of the eyes for glaucoma. CBC, CMP normal, Trop negative, will admit for further workup of hypertensive urgency rule out secondary causes Pt presented with left sided neck and head pain with varying feeling of jaw numbness and tingling; with dizziness, vision problems due to glaucoma.     Patient admitted for Hypertensive urgency. Presented with week long hx of high blood pressure at home and with EMS SBP >200s. Trop negative x2. Home med Losartan discontinued. Patient started on Nifedipine 30 mg daily. BP improved, normotensive during hospitalization. TSH/T3/T4: wnl.  Pending secondary hypertension workup, PRA/PAC, serum metanephrine, random cortisol results. with doppler renal A if the workup above is all negative. Patient also complained of neck pain with MSK etiology. ESR, CRP wnl. Pain improved with BP control and gabapentin 100mg BID. Patient has history of HLD, COPD, DM A1c 6.0% 3/2024. Home medications continued: Symbicort, simvastatin. Home medications Farxiga, FS/SSI. Given Lovenox for DVT ppx.      Pt is a 69F with PMH of HTN, HLD, COPD, nerve palsy, thyroidectomy, presenting with L sided chest heaviness, neck heaviness, and dizziness. Patient had symptoms Monday last week 3/18 which she presented to the ED, had chest pain workup with TTE and stress test that were negative. Has had EGD for GERD symptoms which showed some stress ulcers, and recently had bilateral surgery of the eyes for glaucoma. CBC, CMP normal, Trop negative, will admit for further workup of hypertensive urgency rule out secondary causes. Pt presented with left sided neck and head pain with varying feeling of jaw numbness and tingling; with dizziness, vision problems due to glaucoma.     Patient admitted for Hypertensive urgency. Presented with week long hx of high blood pressure at home and with EMS SBP >200s. Trop negative x2. Home med Losartan discontinued. Patient started on Nifedipine 30 mg daily. BP improved, normotensive during hospitalization. TSH/T3/T4: wnl.  Pending secondary hypertension workup, PRA/PAC, serum metanephrine, random cortisol results. with doppler renal A if the workup above is all negative. Patient also complained of neck pain with MSK etiology. ESR, CRP wnl. Pain improved with BP control and gabapentin 100mg BID. Patient has history of HLD, COPD, DM A1c 6.0% 3/2024. Home medications continued: Symbicort, simvastatin. Home medications Farxiga, FS/SSI. Given Lovenox for DVT ppx.     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 Found to have BP 190s/100s, admitted for hypertensive urgency. Started on nifedipine 30mg in addition to home losartan 25mg, then sBP came down to 90-100s. Had transient Cr rise, ARB held, BP stable only on nifedipine now. She will DC on 3/26 on nifedipine monotherapy at a dose of 30 mg daily. Her BP was well controlled in the 120s systolic at midday on day of DC.     Patient with normal stress test one week prior to admission. Did not repeat in house. No chest pain on day of DC.     Secondary HTN workup pending at time of discharge (plasma eloina/renin ratio, serum metanephrines). Will discuss with PCP.     Patient was counseled re: BP targets and counseled to return to the hospital for any new or worsening symptoms.

## 2024-03-26 NOTE — DISCHARGE NOTE NURSING/CASE MANAGEMENT/SOCIAL WORK - NSDCPEFALRISK_GEN_ALL_CORE
For information on Fall & Injury Prevention, visit: [email or web address] OR  [email or web address] OR  https://www.cdc.gov/steadi/patient.html

## 2024-03-26 NOTE — DISCHARGE NOTE PROVIDER - ATTENDING DISCHARGE PHYSICAL EXAMINATION:
GENERAL: NAD, well-developed, resting in bed comfortably  HEAD:  Atraumatic, Normocephalic  NECK: Supple, No JVD  CHEST/LUNG: Clear to auscultation bilaterally; No wheeze  HEART: Regular rate and rhythm; No murmurs, rubs, or gallops  ABDOMEN: Soft, Nontender, Nondistended; Bowel sounds present  EXTREMITIES:  2+ Peripheral Pulses, No clubbing, cyanosis, or edema  PSYCH: AAOx3  NEUROLOGY: non-focal  SKIN: No rashes or lesions

## 2024-03-26 NOTE — CHART NOTE - NSCHARTNOTEFT_GEN_A_CORE
I dialed Dr. Margaret Chaudhary at number in chart. Automated message replied "You have reached [facility]." "Invalid Input" when trying to leave voice mail.

## 2024-03-30 LAB
CORTICOSTEROID BINDING GLOBULIN RESULT: 2.7 MG/DL — SIGNIFICANT CHANGE UP
CORTIS F/TOTAL MFR SERPL: 19 % — SIGNIFICANT CHANGE UP
CORTIS SERPL-MCNC: 21 UG/DL — HIGH
CORTISOL, FREE RESULT: 4 UG/DL — HIGH

## 2024-04-01 LAB
METANEPHRINE, PL: <25 PG/ML — SIGNIFICANT CHANGE UP (ref 0–88)
NORMETANEPHRINE, PL: 68.8 PG/ML — SIGNIFICANT CHANGE UP (ref 0–285.2)

## 2024-06-06 NOTE — ED PROVIDER NOTE - DR. NAME
Given discharge to pt. Pt verbalized instructions and follow up. Given supporting educational material for their discharge. Pt stated they had no other questions or concerns. IV Dc'd tip intact, dressing applied, pressure held. Pt educated on medication regimen, wound care, signs and symptoms of infection and when to call the doctor.       Katia Dennison)

## 2024-07-11 NOTE — PATIENT PROFILE ADULT - MONEY FOR FOOD
How Severe Are Your Spot(S)?: mild
Have Your Spot(S) Been Treated In The Past?: has been treated
Hpi Title: Evaluation of a Skin Lesion
no

## 2024-08-21 NOTE — ED PROVIDER NOTE - IV ALTEPLASE DOOR HIDDEN
,[email or web address] show

## 2025-01-01 NOTE — ED PROVIDER NOTE - MDM ORDERS SUBMITTED SELECTION
(3) Alterations in Oxygenation (Respiratory Diagnosis, Dehydration, Anemia, Anorexia, Syncope/Dizziness, etc.)
Imaging Studies/Medications

## 2025-01-10 NOTE — PROGRESS NOTE ADULT - SUBJECTIVE AND OBJECTIVE BOX
1/10/2025    Sandor Harris  [address] [facility] [address]    This is important information for your upcoming Interventional Pain Management procedure.  Please read all the information thoroughly.    Procedure: Right and Left Lumbar Radiofrequency ablation   Performed by: Donnie Dixon MD  Date: Thursday January 23rd to arrive at 12:45 PM     And     Date: Thursday February 6th to arrive at 9:15 AM    Location: [facility]  [address] [place] [address]  Take elevator to left of front doors up the 2nd Floor and check in.      YOU ARE HAVING LOCAL ANESTHETIC:  You MAY NOT drive yourself to and from the procedure. You must have a  to bring you to the procedure and take you home from the appointment.        *IMPORTANT *  We understand that unplanned events may cause you to miss your appointment.  If you are unable to attend this appointment, notify us at least 24 hours in advance.  If you miss this appointment without notifying us in advance, we may no longer be able to serve you as your pain management provider.    Call us at [phone] if you have to cancel this appointment.     Diet:   You MAY take morning medications, eat, and do your regular morning routine.     Diagnostic procedure pain medication(s) hold instructions:    This hold is not applicable to the procedure being ordered. You may continue to take these as prescribed to you.     Blood thinner medication(s) hold instructions:   Blood thinner medications do not need to be held for this procedure. You can continue to take these as prescribed to you.     There is an increased risk of heart attack or stroke any time that this medication is held.    If these medication instructions must change, a nurse will call you with new instructions. If you don't hold your medications as instructed, your procedure may need to be cancelled.        NSAID medication hold instructions:  This medication hold is  Patient is a 63y old  Female who presents with a chief complaint of lower abdominal pain for 4 days (15 Sep 2018 10:01)    Awake, alert, comfortable in bed in NAD. Will have EGD because of abdominal pain. Denies cough or sob at rest  Pt has no new complaints. Clinically stable while on antibiotics.      INTERVAL HPI/OVERNIGHT EVENTS:      VITAL SIGNS:  T(F): 97.5 (09-15-18 @ 05:56)  HR: 73 (09-15-18 @ 05:56)  BP: 127/72 (09-15-18 @ 05:56)  RR: 17 (09-15-18 @ 05:56)  SpO2: 100% (09-15-18 @ 05:56)  Wt(kg): --  I&O's Detail    14 Sep 2018 07:01  -  15 Sep 2018 07:00  --------------------------------------------------------  IN:    Oral Fluid: 360 mL  Total IN: 360 mL    OUT:  Total OUT: 0 mL    Total NET: 360 mL              REVIEW OF SYSTEMS:    CONSTITUTIONAL:  No fevers, chills, sweats    HEENT:  Eyes:  No diplopia or blurred vision. ENT:  No earache, sore throat or runny nose.    CARDIOVASCULAR:  No pressure, squeezing, tightness, or heaviness about the chest; no palpitations.    RESPIRATORY:  Per HPI    GASTROINTESTINAL: + abdominal pain,no nausea, vomiting or diarrhea.    GENITOURINARY:  No dysuria, frequency or urgency.    NEUROLOGIC:  No paresthesias, fasciculations, seizures or weakness.    PSYCHIATRIC:  No disorder of thought or mood.      PHYSICAL EXAM:    Constitutional: Well developed and nourished  Eyes:Perrla  ENMT: normal  Neck:supple  Respiratory: + Wheezing posteriorly  Cardiovascular: S1 S2 regular  Gastrointestinal: Soft, + mildly tender   Extremities: No edema  Vascular:normal  Neurological:Awake, alert,Ox3  Musculoskeletal:Normal      MEDICATIONS  (STANDING):  ALBUTerol    90 MICROgram(s) HFA Inhaler 1 Puff(s) Inhalation every 6 hours  buDESOnide  80 MICROgram(s)/formoterol 4.5 MICROgram(s) Inhaler 2 Puff(s) Inhalation two times a day  cefTRIAXone   IVPB 1 Gram(s) IV Intermittent every 24 hours  dextrose 5% + sodium chloride 0.9%. 1000 milliLiter(s) (70 mL/Hr) IV Continuous <Continuous>  enoxaparin Injectable 40 milliGRAM(s) SubCutaneous daily  ergocalciferol 04284 Unit(s) Oral <User Schedule>  ferrous    sulfate 325 milliGRAM(s) Oral daily  melatonin 3 milliGRAM(s) Oral at bedtime  metroNIDAZOLE  IVPB 500 milliGRAM(s) IV Intermittent every 8 hours  pantoprazole    Tablet 40 milliGRAM(s) Oral before breakfast  sodium chloride 0.9% with potassium chloride 20 mEq/L 1000 milliLiter(s) (70 mL/Hr) IV Continuous <Continuous>  tamsulosin 0.4 milliGRAM(s) Oral at bedtime    MEDICATIONS  (PRN):  acetaminophen   Tablet .. 650 milliGRAM(s) Oral every 6 hours PRN Temp greater or equal to 38C (100.4F)  zolpidem 5 milliGRAM(s) Oral at bedtime PRN Insomnia      Allergies    Motrin (Swelling)    Intolerances        LABS:                        9.1    12.0  )-----------( 434      ( 15 Sep 2018 06:55 )             28.0     09-15    142  |  108  |  6<L>  ----------------------------<  116<H>  3.6   |  25  |  0.83    Ca    8.0<L>      15 Sep 2018 06:55  Phos  2.4     09-15  Mg     1.6     09-15                CAPILLARY BLOOD GLUCOSE            RADIOLOGY & ADDITIONAL TESTS:    CXR:  < from: Xray Chest 1 View AP/PA (09.09.18 @ 20:00) >    INTERPRETATION:  History: Upper abdominal pain    Technique:  AP portable    Comparisons:  none    Findings:     Subsegmental atelectasis and volume loss inleft lower   lobe.  . Right lung is clear. No pleural effusions.    The pulmonary   vasculature and aorta are normal for age. Heart size is unremarkable.     The thorax is normal for age.    Impression: Subsegmental atelectasis left lower lobe. Mild relative   elevation of left diaphragm      < end of copied text >    Ct scan chest:  < from: CT Abdomen and Pelvis w/ Oral Cont and w/ IV Cont (09.09.18 @ 22:18) >  FINDINGS:    LUNG BASES:  There are no pleural effusions. Cystic bronchiectasis at the   left lung base    < end of copied text >  < from: MR MRCP w/wo IV Cont (09.12.18 @ 19:33) >  Impression: The common bile duct measures up to 6 mm in caliber which is   within normal limits. No suspicious filling defect in the common bile   duct to suggest choledocholithiasis. The main pancreatic duct is not   dilated.     In the upper left kidney, there is a small area of striated   hypoenhancement, suggestive of pyelonephritis. Small nonenhancing foci   within this area of striated hypoenhancement in the upper left kidney   measuring up to 8 mm, suggestive of associated microabscesses. Follow-up   abdominal MR in 4-6 weeks may be pursued to ensure resolution or   improvement, and to rule out malignant neoplastic process.    < end of copied text >    ekg;    echo:  < from: US Renal (09.11.18 @ 11:59) >  IMPRESSION:  No hydronephrosis.       < end of copied text > not applicable according to your medication list and review at the time the instructions were given to you. Please let us know if you get started on this type of medication before your procedure, so we can review if a hold is needed.       Diabetic and Prediabetic patient specific instructions:  Please update your provider who manages your diabetes that you will be having a steriod injection in the next few weeks.   Steroid injections start to affect blood sugar levels soon after the injection and they can remain high for 3-10 days after.       NOTE:  The dates of these medication holds might change based upon information we receive from the prescribing doctor, or if your procedure date changes.      All other prescribed medication instructions:  Take all other medications (heart, blood pressure, etc) as you normally would unless directed otherwise.    If you wear a Continuous Glucose Monitor:  Continuous Glucose Monitors (CGM) (Damien, Dexcom, Medtronic, Etc...) may not be worn during your procedure. It will need to be removed. The fluoroscopy may or will interfere with its function and no longer give accurate glucose results.     Antibiotics:  If you get prescribed an antibiotic before your procedure update our office, so a review can be done to see if rescheduling of your procedure is needed.    Illness:  If you have any illness that causes a fever, rash, or cold sore before your scheduled procedure please update our office, so a review can be done to see if rescheduling of your procedure is needed.     Vaccines:  If you have had received a vaccine in the 14 days before your scheduled procedure please update our office, so a review can be done to see if rescheduling of your procedure is needed.     If you have any questions regarding the above instructions  call our office at [phone] and we will be happy to answer in detail.        Sincerely,    Donnie Dixon MD's Scheduling Team  Tosin Garcia  [facility]  [address]  [place] [address]  Dept: [phone]  Dept Fax: [phone]                 Patient is a 63y old  Female who presents with a chief complaint of lower abdominal pain for 4 days (15 Sep 2018 10:01)    Awake, alert, comfortable in bed in NAD. Had EGD because of abdominal pain. Denies cough or sob at rest  Pt has no new complaints. Clinically stable while on antibiotics.      INTERVAL HPI/OVERNIGHT EVENTS:      VITAL SIGNS:  T(F): 97.5 (09-15-18 @ 05:56)  HR: 73 (09-15-18 @ 05:56)  BP: 127/72 (09-15-18 @ 05:56)  RR: 17 (09-15-18 @ 05:56)  SpO2: 100% (09-15-18 @ 05:56)  Wt(kg): --  I&O's Detail    14 Sep 2018 07:01  -  15 Sep 2018 07:00  --------------------------------------------------------  IN:    Oral Fluid: 360 mL  Total IN: 360 mL    OUT:  Total OUT: 0 mL    Total NET: 360 mL              REVIEW OF SYSTEMS:    CONSTITUTIONAL:  No fevers, chills, sweats    HEENT:  Eyes:  No diplopia or blurred vision. ENT:  No earache, sore throat or runny nose.    CARDIOVASCULAR:  No pressure, squeezing, tightness, or heaviness about the chest; no palpitations.    RESPIRATORY:  Per HPI    GASTROINTESTINAL: + abdominal pain,no nausea, vomiting or diarrhea.    GENITOURINARY:  No dysuria, frequency or urgency.    NEUROLOGIC:  No paresthesias, fasciculations, seizures or weakness.    PSYCHIATRIC:  No disorder of thought or mood.      PHYSICAL EXAM:    Constitutional: Well developed and nourished  Eyes:Perrla  ENMT: normal  Neck:supple  Respiratory: + Wheezing posteriorly  Cardiovascular: S1 S2 regular  Gastrointestinal: Soft, + mildly tender   Extremities: No edema  Vascular:normal  Neurological:Awake, alert,Ox3  Musculoskeletal:Normal      MEDICATIONS  (STANDING):  ALBUTerol    90 MICROgram(s) HFA Inhaler 1 Puff(s) Inhalation every 6 hours  buDESOnide  80 MICROgram(s)/formoterol 4.5 MICROgram(s) Inhaler 2 Puff(s) Inhalation two times a day  cefTRIAXone   IVPB 1 Gram(s) IV Intermittent every 24 hours  dextrose 5% + sodium chloride 0.9%. 1000 milliLiter(s) (70 mL/Hr) IV Continuous <Continuous>  enoxaparin Injectable 40 milliGRAM(s) SubCutaneous daily  ergocalciferol 37658 Unit(s) Oral <User Schedule>  ferrous    sulfate 325 milliGRAM(s) Oral daily  melatonin 3 milliGRAM(s) Oral at bedtime  metroNIDAZOLE  IVPB 500 milliGRAM(s) IV Intermittent every 8 hours  pantoprazole    Tablet 40 milliGRAM(s) Oral before breakfast  sodium chloride 0.9% with potassium chloride 20 mEq/L 1000 milliLiter(s) (70 mL/Hr) IV Continuous <Continuous>  tamsulosin 0.4 milliGRAM(s) Oral at bedtime    MEDICATIONS  (PRN):  acetaminophen   Tablet .. 650 milliGRAM(s) Oral every 6 hours PRN Temp greater or equal to 38C (100.4F)  zolpidem 5 milliGRAM(s) Oral at bedtime PRN Insomnia      Allergies    Motrin (Swelling)    Intolerances        LABS:                        9.1    12.0  )-----------( 434      ( 15 Sep 2018 06:55 )             28.0     09-15    142  |  108  |  6<L>  ----------------------------<  116<H>  3.6   |  25  |  0.83    Ca    8.0<L>      15 Sep 2018 06:55  Phos  2.4     09-15  Mg     1.6     09-15                CAPILLARY BLOOD GLUCOSE            RADIOLOGY & ADDITIONAL TESTS:    CXR:  < from: Xray Chest 1 View AP/PA (09.09.18 @ 20:00) >    INTERPRETATION:  History: Upper abdominal pain    Technique:  AP portable    Comparisons:  none    Findings:     Subsegmental atelectasis and volume loss inleft lower   lobe.  . Right lung is clear. No pleural effusions.    The pulmonary   vasculature and aorta are normal for age. Heart size is unremarkable.     The thorax is normal for age.    Impression: Subsegmental atelectasis left lower lobe. Mild relative   elevation of left diaphragm      < end of copied text >    Ct scan chest:  < from: CT Abdomen and Pelvis w/ Oral Cont and w/ IV Cont (09.09.18 @ 22:18) >  FINDINGS:    LUNG BASES:  There are no pleural effusions. Cystic bronchiectasis at the   left lung base    < end of copied text >  < from: MR MRCP w/wo IV Cont (09.12.18 @ 19:33) >  Impression: The common bile duct measures up to 6 mm in caliber which is   within normal limits. No suspicious filling defect in the common bile   duct to suggest choledocholithiasis. The main pancreatic duct is not   dilated.     In the upper left kidney, there is a small area of striated   hypoenhancement, suggestive of pyelonephritis. Small nonenhancing foci   within this area of striated hypoenhancement in the upper left kidney   measuring up to 8 mm, suggestive of associated microabscesses. Follow-up   abdominal MR in 4-6 weeks may be pursued to ensure resolution or   improvement, and to rule out malignant neoplastic process.    < end of copied text >    ekg;    echo:  < from: US Renal (09.11.18 @ 11:59) >  IMPRESSION:  No hydronephrosis.       < end of copied text >

## 2025-05-15 NOTE — PATIENT PROFILE ADULT - NSPROMEDSBROUGHTTOHOSP_GEN_A_NUR
"Tu Delong" Enrique was seen and treated in our emergency department on 5/15/2025.  She may return to work on 05/16/2025.       If you have any questions or concerns, please don't hesitate to call.      Leona SEBASTIAN    "
no